# Patient Record
Sex: MALE | Race: WHITE | NOT HISPANIC OR LATINO | Employment: OTHER | URBAN - METROPOLITAN AREA
[De-identification: names, ages, dates, MRNs, and addresses within clinical notes are randomized per-mention and may not be internally consistent; named-entity substitution may affect disease eponyms.]

---

## 2017-10-14 ENCOUNTER — APPOINTMENT (OUTPATIENT)
Dept: CT IMAGING | Facility: HOSPITAL | Age: 72
End: 2017-10-14

## 2017-10-14 ENCOUNTER — HOSPITAL ENCOUNTER (EMERGENCY)
Facility: HOSPITAL | Age: 72
Discharge: HOME | End: 2017-10-14
Attending: EMERGENCY MEDICINE | Admitting: EMERGENCY MEDICINE

## 2017-10-14 VITALS
RESPIRATION RATE: 16 BRPM | SYSTOLIC BLOOD PRESSURE: 135 MMHG | TEMPERATURE: 97.3 F | WEIGHT: 210 LBS | BODY MASS INDEX: 29.4 KG/M2 | HEIGHT: 71 IN | DIASTOLIC BLOOD PRESSURE: 91 MMHG | OXYGEN SATURATION: 98 % | HEART RATE: 92 BPM

## 2017-10-14 DIAGNOSIS — S01.81XA FACIAL LACERATION, INITIAL ENCOUNTER: ICD-10-CM

## 2017-10-14 DIAGNOSIS — S01.01XA LACERATION OF SCALP, INITIAL ENCOUNTER: ICD-10-CM

## 2017-10-14 DIAGNOSIS — R55 NEAR SYNCOPE: Primary | ICD-10-CM

## 2017-10-14 DIAGNOSIS — S00.03XA CONTUSION OF SCALP, INITIAL ENCOUNTER: ICD-10-CM

## 2017-10-14 LAB
ALBUMIN SERPL-MCNC: 4.1 G/DL (ref 3.5–5.2)
ALBUMIN/GLOB SERPL: 1.4 G/DL
ALP SERPL-CCNC: 66 U/L (ref 39–117)
ALT SERPL W P-5'-P-CCNC: 22 U/L (ref 1–41)
ANION GAP SERPL CALCULATED.3IONS-SCNC: 14.7 MMOL/L
APTT PPP: 25.9 SECONDS (ref 22.7–35.4)
AST SERPL-CCNC: 13 U/L (ref 1–40)
BASOPHILS # BLD AUTO: 0.06 10*3/MM3 (ref 0–0.2)
BASOPHILS NFR BLD AUTO: 0.7 % (ref 0–1.5)
BILIRUB SERPL-MCNC: 0.6 MG/DL (ref 0.1–1.2)
BILIRUB UR QL STRIP: NEGATIVE
BUN BLD-MCNC: 18 MG/DL (ref 8–23)
BUN/CREAT SERPL: 16.4 (ref 7–25)
CALCIUM SPEC-SCNC: 9.6 MG/DL (ref 8.6–10.5)
CHLORIDE SERPL-SCNC: 104 MMOL/L (ref 98–107)
CLARITY UR: CLEAR
CO2 SERPL-SCNC: 23.3 MMOL/L (ref 22–29)
COLOR UR: YELLOW
CREAT BLD-MCNC: 1.1 MG/DL (ref 0.76–1.27)
DEPRECATED RDW RBC AUTO: 44.1 FL (ref 37–54)
EOSINOPHIL # BLD AUTO: 0.51 10*3/MM3 (ref 0–0.7)
EOSINOPHIL NFR BLD AUTO: 5.7 % (ref 0.3–6.2)
ERYTHROCYTE [DISTWIDTH] IN BLOOD BY AUTOMATED COUNT: 12.9 % (ref 11.5–14.5)
GFR SERPL CREATININE-BSD FRML MDRD: 66 ML/MIN/1.73
GLOBULIN UR ELPH-MCNC: 3 GM/DL
GLUCOSE BLD-MCNC: 125 MG/DL (ref 65–99)
GLUCOSE UR STRIP-MCNC: NEGATIVE MG/DL
HCT VFR BLD AUTO: 44.6 % (ref 40.4–52.2)
HGB BLD-MCNC: 15.8 G/DL (ref 13.7–17.6)
HGB UR QL STRIP.AUTO: NEGATIVE
IMM GRANULOCYTES # BLD: 0.05 10*3/MM3 (ref 0–0.03)
IMM GRANULOCYTES NFR BLD: 0.6 % (ref 0–0.5)
INR PPP: 1 (ref 0.9–1.1)
KETONES UR QL STRIP: NEGATIVE
LEUKOCYTE ESTERASE UR QL STRIP.AUTO: NEGATIVE
LYMPHOCYTES # BLD AUTO: 2.32 10*3/MM3 (ref 0.9–4.8)
LYMPHOCYTES NFR BLD AUTO: 25.9 % (ref 19.6–45.3)
MCH RBC QN AUTO: 33.2 PG (ref 27–32.7)
MCHC RBC AUTO-ENTMCNC: 35.4 G/DL (ref 32.6–36.4)
MCV RBC AUTO: 93.7 FL (ref 79.8–96.2)
MONOCYTES # BLD AUTO: 0.62 10*3/MM3 (ref 0.2–1.2)
MONOCYTES NFR BLD AUTO: 6.9 % (ref 5–12)
NEUTROPHILS # BLD AUTO: 5.41 10*3/MM3 (ref 1.9–8.1)
NEUTROPHILS NFR BLD AUTO: 60.2 % (ref 42.7–76)
NITRITE UR QL STRIP: NEGATIVE
PH UR STRIP.AUTO: 6 [PH] (ref 5–8)
PLATELET # BLD AUTO: 253 10*3/MM3 (ref 140–500)
PMV BLD AUTO: 9.9 FL (ref 6–12)
POTASSIUM BLD-SCNC: 3.8 MMOL/L (ref 3.5–5.2)
PROT SERPL-MCNC: 7.1 G/DL (ref 6–8.5)
PROT UR QL STRIP: NEGATIVE
PROTHROMBIN TIME: 12.8 SECONDS (ref 11.7–14.2)
RBC # BLD AUTO: 4.76 10*6/MM3 (ref 4.6–6)
SODIUM BLD-SCNC: 142 MMOL/L (ref 136–145)
SP GR UR STRIP: 1.02 (ref 1–1.03)
TROPONIN T SERPL-MCNC: <0.01 NG/ML (ref 0–0.03)
UROBILINOGEN UR QL STRIP: NORMAL
WBC NRBC COR # BLD: 8.97 10*3/MM3 (ref 4.5–10.7)

## 2017-10-14 PROCEDURE — 81003 URINALYSIS AUTO W/O SCOPE: CPT | Performed by: NURSE PRACTITIONER

## 2017-10-14 PROCEDURE — 93005 ELECTROCARDIOGRAM TRACING: CPT | Performed by: NURSE PRACTITIONER

## 2017-10-14 PROCEDURE — 85025 COMPLETE CBC W/AUTO DIFF WBC: CPT | Performed by: NURSE PRACTITIONER

## 2017-10-14 PROCEDURE — 99284 EMERGENCY DEPT VISIT MOD MDM: CPT

## 2017-10-14 PROCEDURE — 80053 COMPREHEN METABOLIC PANEL: CPT | Performed by: NURSE PRACTITIONER

## 2017-10-14 PROCEDURE — 85610 PROTHROMBIN TIME: CPT | Performed by: NURSE PRACTITIONER

## 2017-10-14 PROCEDURE — 70450 CT HEAD/BRAIN W/O DYE: CPT

## 2017-10-14 PROCEDURE — 85730 THROMBOPLASTIN TIME PARTIAL: CPT | Performed by: NURSE PRACTITIONER

## 2017-10-14 PROCEDURE — 84484 ASSAY OF TROPONIN QUANT: CPT | Performed by: NURSE PRACTITIONER

## 2017-10-14 PROCEDURE — 93010 ELECTROCARDIOGRAM REPORT: CPT | Performed by: INTERNAL MEDICINE

## 2017-10-14 RX ORDER — LIDOCAINE HYDROCHLORIDE AND EPINEPHRINE 10; 10 MG/ML; UG/ML
20 INJECTION, SOLUTION INFILTRATION; PERINEURAL ONCE
Status: COMPLETED | OUTPATIENT
Start: 2017-10-14 | End: 2017-10-14

## 2017-10-14 RX ADMIN — LIDOCAINE HYDROCHLORIDE AND EPINEPHRINE 20 ML: 10; 10 INJECTION, SOLUTION INFILTRATION; PERINEURAL at 12:30

## 2017-10-14 NOTE — ED NOTES
Attempted to obtain urine specimen at this time. Pt states he does not need to urinate at this time and does not want this RN to obtain specimen by urinary catheter.     Magy Newton RN  10/14/17 1120

## 2017-10-14 NOTE — ED PROVIDER NOTES
Patient presented to the ED with a laceration on his forehead that happened this morning when he struck his head on his house. On exam, the pt has a vertical laceration involving scalp and forehead. I agree with the plan to order a CT Head for further evaluation prior to the NP suturing the pt's laceration.     EKG           EKG time: 1109  Rhythm/Rate: NSR, 94  P waves and IL: normal P waves, 1st degree AV block  QRS, axis: RBBB, LAFB   ST and T waves: repolarization changes     Interpreted Contemporaneously by me, independently viewed  No old for comparison    I supervised care provided by the P. We have discussed the case. I have reviewed  the note and agree with the plan of treatment. I personally interviewed the patient and examined the patient.    Documentation assistance provided by phill Mcallister and Sunshine Dickens for Dr. Wheatley.  Information recorded by the scribe was done at my direction and has been verified and validated by me.        Sunshine Dickens  10/14/17 1054       Celina Mcallister  10/14/17 1139       Odin Wheatley MD  10/14/17 7914

## 2017-10-14 NOTE — ED NOTES
Pt c/o laceration to right side of the front of his head/forehead after hitting a chimney. Pt is unsure why he hit it. Pt is unsure if he lost consciousness or not     Magy Newton RN  10/14/17 1042       Magy Newton RN  10/14/17 6880

## 2017-10-14 NOTE — DISCHARGE INSTRUCTIONS
Continue current home medications  Keep lacerations clean and dry, apply antibiotic ointment  Monitor for signs of infection-redness,swelling,drainage, fever, chills  Have stiches removed in 7-10 days  Ice to head  Follow up with pmd of choice in 7-10 days  Return to er for headache, vomiting, visual changes, signs of infection, pain or any new or worsening symptoms

## 2017-10-14 NOTE — ED PROVIDER NOTES
"  EMERGENCY DEPARTMENT ENCOUNTER    CHIEF COMPLAINT  Chief Complaint: Laceration  History given by:Pt  History limited by:Nothing  Room Number: 36/36  PMD: No Known Provider      HPI:  Pt is a 72 y.o. male who presents with a facial and scalp laceration PTA.  He reports he was walking when he tripped and fell on the chimney corner. Pt denies LOC secondary to his fall. Pt denies neck pain, back pain, headache, or any other symptoms at this time. Patient denies any other symptoms at this time.  Pt has no significant Past Medical History.    Duration: PTA  Timing:sudden  Location:facial/scalp  Radiation:none  Quality:\"laceration\"  Intensity/Severity:moderate  Progression:unchanged  Associated Symptoms:none  Aggravating Factors:none  Alleviating Factors:none  Previous Episodes:none  Treatment before arrival:Pt received no treatment PTA.    PAST MEDICAL HISTORY  Active Ambulatory Problems     Diagnosis Date Noted   • No Active Ambulatory Problems     Resolved Ambulatory Problems     Diagnosis Date Noted   • No Resolved Ambulatory Problems     No Additional Past Medical History       PAST SURGICAL HISTORY  History reviewed. No pertinent surgical history.    FAMILY HISTORY  History reviewed. No pertinent family history.    SOCIAL HISTORY  Social History     Social History   • Marital status: Single     Spouse name: N/A   • Number of children: N/A   • Years of education: N/A     Occupational History   • Not on file.     Social History Main Topics   • Smoking status: Current Every Day Smoker   • Smokeless tobacco: Not on file   • Alcohol use No   • Drug use: Not on file   • Sexual activity: Not on file     Other Topics Concern   • Not on file     Social History Narrative   • No narrative on file         ALLERGIES  Review of patient's allergies indicates no known allergies.    REVIEW OF SYSTEMS  Review of Systems   Constitutional: Negative for chills and fever.   HENT: Negative for sore throat.    Gastrointestinal: Negative " for nausea and vomiting.   Genitourinary: Negative for dysuria.   Musculoskeletal: Negative for back pain.   Skin: Positive for wound (facial and scalp laceration). Negative for rash.   Psychiatric/Behavioral: The patient is not nervous/anxious.        PHYSICAL EXAM  ED Triage Vitals   Temp Heart Rate Resp BP SpO2   10/14/17 1011 10/14/17 1011 10/14/17 1011 10/14/17 1046 10/14/17 1011   97 °F (36.1 °C) 112 18 163/72 97 %      Temp src Heart Rate Source Patient Position BP Location FiO2 (%)   10/14/17 1011 10/14/17 1011 10/14/17 1046 10/14/17 1046 --   Tympanic Monitor Sitting Left arm          Physical Exam   Constitutional: He is well-developed, well-nourished, and in no distress. No distress.   HENT:   Head: Atraumatic.   Mouth/Throat: Mucous membranes are normal.   Eyes: Conjunctivae and EOM are normal. Pupils are equal, round, and reactive to light. No scleral icterus.   Neck: Normal range of motion.   Cardiovascular: Normal rate, regular rhythm and normal heart sounds.    Pulmonary/Chest: Effort normal and breath sounds normal.   Musculoskeletal: Normal range of motion.        Cervical back: He exhibits no tenderness and no bony tenderness.   Neurological: He is alert.   Skin: Skin is warm and dry. Laceration (5cm scalp laceration and 2cm forehead laceration) noted.   Psychiatric: Mood and affect normal.   Nursing note and vitals reviewed.        EKG    EKG was interpreted by Dr. Wheatley, see his note for interpretation      PROCEDURES    Laceration Repair  Date/Time: 10/14/2017 12:25 PM  Performed by: JUANI PANDYA  Authorized by: ZARA WHEATLEY   Consent: Verbal consent obtained.  Risks and benefits: risks, benefits and alternatives were discussed  Consent given by: patient  Patient understanding: patient states understanding of the procedure being performed  Patient consent: the patient's understanding of the procedure matches consent given  Required items: required blood products, implants, devices,  and special equipment available  Patient identity confirmed: verbally with patient  Body area: head/neck  Location details: scalp  Laceration length: 5 cm  Foreign bodies: no foreign bodies  Tendon involvement: none  Nerve involvement: none  Vascular damage: no    Anesthesia:  Local Anesthetic: lidocaine 1% with epinephrine  Anesthetic total: 7 mL    Sedation:  Patient sedated: no  Preparation: Patient was prepped and draped in the usual sterile fashion.    Laceration Repair  Date/Time: 10/14/2017 12:25 PM  Performed by: JUANI PANDYA  Authorized by: ZARA WHEATLEY   Consent: Verbal consent obtained.  Risks and benefits: risks, benefits and alternatives were discussed  Consent given by: patient  Patient understanding: patient states understanding of the procedure being performed  Patient consent: the patient's understanding of the procedure matches consent given  Required items: required blood products, implants, devices, and special equipment available  Patient identity confirmed: verbally with patient  Body area: head/neck  Location details: forehead  Laceration length: 2 cm  Foreign bodies: no foreign bodies  Tendon involvement: none  Nerve involvement: none  Vascular damage: no    Anesthesia:  Local Anesthetic: lidocaine 1% with epinephrine  Anesthetic total: 3 mL    Sedation:  Patient sedated: no  Skin closure: 4-0 nylon  Number of sutures: 2  Technique: simple  Approximation difficulty: simple  Dressing: 4x4 sterile gauze  Patient tolerance: Patient tolerated the procedure well with no immediate complications          PROGRESS AND CONSULTS    1047 Reviewed pt's history and workup with Dr. Wheatley.  At bedside evaluation, they agree with the plan of care.    1340 Recheck pt who is resting and in no acute distress. Informed pt plan to discharge. Suggested pt follow up with PCP and have sutures removed in 7-10 days. Suggested pt keep sutures clean and dry. Reviewed implications of results, diagnosis, meds,  "responsibility to follow up, warning signs and symptoms of possible worsening, potential complications and reasons to return to ER with patient.  Discussed all results and noted any abnormalities with patient.  Discussed absolute need to recheck abnormalities with new or worsening symptoms.    Discussed plan for discharge, as there is no emergent indication for admission.  Pt is agreeable and understands need for follow up and repeat testing.  Pt is aware that discharge does not mean that nothing is wrong but it indicates no emergency is present.  Pt is discharged with instructions to follow up with primary care doctor to have their blood pressure rechecked.       DIAGNOSIS  Final diagnoses:   Near syncope   Contusion of scalp, initial encounter   Facial laceration, initial encounter   Laceration of scalp, initial encounter       FOLLOW UP   [facility] REFERRAL SERVICE  [place] [id number] [phone]  Call in 1 week          COURSE & MEDICAL DECISION MAKING  Pertinent Labs and Imaging studies that were ordered and reviewed are noted above.  Results were reviewed/discussed with the patient and they were also made aware of online assess.   Pt also made aware that some labs, such as cultures, will not be resulted during ER visit and follow up with PMD is necessary.     MEDICATIONS GIVEN IN ER  Medications   lidocaine-EPINEPHrine (XYLOCAINE W/EPI) 1 %-1:730208 injection 20 mL (20 mL Injection Given 10/14/17 1230)       /87 (Patient Position: Sitting)  Pulse 82  Temp 97 °F (36.1 °C) (Tympanic)   Resp 16  Ht 71\" (180.3 cm)  Wt 210 lb (95.3 kg)  SpO2 98%  BMI 29.29 kg/m2      I personally reviewed the past medical history, past surgical history, social history, family history, current medications and allergies as they appear in this chart.  The scribe's note accurately reflects the work and decisions made by me.     Documentation assistance provided by clarissa Vaughn " for HAYDEN Madsen on 10/14/2017 at 10:39 AM. Information recorded by the scribe was done at my direction and has been verified and validated by me.       Guerrero Vaughn  10/14/17 0937       Lorraine Hayden, STEVE  10/14/17 9320

## 2019-05-05 ENCOUNTER — APPOINTMENT (OUTPATIENT)
Dept: GENERAL RADIOLOGY | Facility: HOSPITAL | Age: 74
End: 2019-05-05

## 2019-05-05 ENCOUNTER — HOSPITAL ENCOUNTER (INPATIENT)
Facility: HOSPITAL | Age: 74
LOS: 2 days | Discharge: HOME | End: 2019-05-07
Attending: EMERGENCY MEDICINE | Admitting: INTERNAL MEDICINE

## 2019-05-05 ENCOUNTER — APPOINTMENT (OUTPATIENT)
Dept: CARDIOLOGY | Facility: HOSPITAL | Age: 74
End: 2019-05-05

## 2019-05-05 DIAGNOSIS — I44.2 THIRD DEGREE HEART BLOCK (HCC): Primary | ICD-10-CM

## 2019-05-05 DIAGNOSIS — R55 RECURRENT SYNCOPE: ICD-10-CM

## 2019-05-05 LAB
ALBUMIN SERPL-MCNC: 4.2 G/DL (ref 3.5–5.2)
ALBUMIN SERPL-MCNC: 4.3 G/DL (ref 3.5–5.2)
ALBUMIN/GLOB SERPL: 1.5 G/DL
ALBUMIN/GLOB SERPL: 1.7 G/DL
ALP SERPL-CCNC: 60 U/L (ref 39–117)
ALP SERPL-CCNC: 64 U/L (ref 39–117)
ALT SERPL W P-5'-P-CCNC: 38 U/L (ref 1–41)
ALT SERPL W P-5'-P-CCNC: 44 U/L (ref 1–41)
ANION GAP SERPL CALCULATED.3IONS-SCNC: 12.4 MMOL/L
ANION GAP SERPL CALCULATED.3IONS-SCNC: 17.7 MMOL/L
AST SERPL-CCNC: 15 U/L (ref 1–40)
AST SERPL-CCNC: 18 U/L (ref 1–40)
BASOPHILS # BLD AUTO: 0.04 10*3/MM3 (ref 0–0.2)
BASOPHILS NFR BLD AUTO: 0.2 % (ref 0–1.5)
BILIRUB SERPL-MCNC: 1.3 MG/DL (ref 0.2–1.2)
BILIRUB SERPL-MCNC: 1.6 MG/DL (ref 0.2–1.2)
BUN BLD-MCNC: 16 MG/DL (ref 8–23)
BUN BLD-MCNC: 18 MG/DL (ref 8–23)
BUN/CREAT SERPL: 13.9 (ref 7–25)
BUN/CREAT SERPL: 14 (ref 7–25)
CALCIUM SPEC-SCNC: 8.9 MG/DL (ref 8.6–10.5)
CALCIUM SPEC-SCNC: 9.5 MG/DL (ref 8.6–10.5)
CHLORIDE SERPL-SCNC: 106 MMOL/L (ref 98–107)
CHLORIDE SERPL-SCNC: 108 MMOL/L (ref 98–107)
CO2 SERPL-SCNC: 18.3 MMOL/L (ref 22–29)
CO2 SERPL-SCNC: 21.6 MMOL/L (ref 22–29)
CREAT BLD-MCNC: 1.15 MG/DL (ref 0.76–1.27)
CREAT BLD-MCNC: 1.29 MG/DL (ref 0.76–1.27)
D-LACTATE SERPL-SCNC: 1.3 MMOL/L (ref 0.5–2)
DEPRECATED RDW RBC AUTO: 43.4 FL (ref 37–54)
EOSINOPHIL # BLD AUTO: 0.07 10*3/MM3 (ref 0–0.4)
EOSINOPHIL NFR BLD AUTO: 0.4 % (ref 0.3–6.2)
ERYTHROCYTE [DISTWIDTH] IN BLOOD BY AUTOMATED COUNT: 12.6 % (ref 12.3–15.4)
GFR SERPL CREATININE-BSD FRML MDRD: 55 ML/MIN/1.73
GFR SERPL CREATININE-BSD FRML MDRD: 63 ML/MIN/1.73
GLOBULIN UR ELPH-MCNC: 2.5 GM/DL
GLOBULIN UR ELPH-MCNC: 2.9 GM/DL
GLUCOSE BLD-MCNC: 120 MG/DL (ref 65–99)
GLUCOSE BLD-MCNC: 121 MG/DL (ref 65–99)
GLUCOSE BLDC GLUCOMTR-MCNC: 113 MG/DL (ref 70–130)
GLUCOSE BLDC GLUCOMTR-MCNC: 121 MG/DL (ref 70–130)
GLUCOSE BLDC GLUCOMTR-MCNC: 137 MG/DL (ref 70–130)
HCT VFR BLD AUTO: 47.2 % (ref 37.5–51)
HGB BLD-MCNC: 16 G/DL (ref 13–17.7)
HOLD SPECIMEN: NORMAL
HOLD SPECIMEN: NORMAL
IMM GRANULOCYTES # BLD AUTO: 0.12 10*3/MM3 (ref 0–0.05)
IMM GRANULOCYTES NFR BLD AUTO: 0.7 % (ref 0–0.5)
INR PPP: 1.02 (ref 0.9–1.1)
LYMPHOCYTES # BLD AUTO: 2.2 10*3/MM3 (ref 0.7–3.1)
LYMPHOCYTES NFR BLD AUTO: 12.7 % (ref 19.6–45.3)
MAGNESIUM SERPL-MCNC: 2.2 MG/DL (ref 1.6–2.4)
MAGNESIUM SERPL-MCNC: 2.2 MG/DL (ref 1.6–2.4)
MCH RBC QN AUTO: 31.8 PG (ref 26.6–33)
MCHC RBC AUTO-ENTMCNC: 33.9 G/DL (ref 31.5–35.7)
MCV RBC AUTO: 93.8 FL (ref 79–97)
MONOCYTES # BLD AUTO: 1.01 10*3/MM3 (ref 0.1–0.9)
MONOCYTES NFR BLD AUTO: 5.8 % (ref 5–12)
NEUTROPHILS # BLD AUTO: 13.9 10*3/MM3 (ref 1.7–7)
NEUTROPHILS NFR BLD AUTO: 80.2 % (ref 42.7–76)
NRBC BLD AUTO-RTO: 0 /100 WBC (ref 0–0.2)
PLATELET # BLD AUTO: 330 10*3/MM3 (ref 140–450)
PMV BLD AUTO: 10.3 FL (ref 6–12)
POTASSIUM BLD-SCNC: 3.4 MMOL/L (ref 3.5–5.2)
POTASSIUM BLD-SCNC: 3.5 MMOL/L (ref 3.5–5.2)
PROT SERPL-MCNC: 6.7 G/DL (ref 6–8.5)
PROT SERPL-MCNC: 7.2 G/DL (ref 6–8.5)
PROTHROMBIN TIME: 13.1 SECONDS (ref 11.7–14.2)
RBC # BLD AUTO: 5.03 10*6/MM3 (ref 4.14–5.8)
SODIUM BLD-SCNC: 142 MMOL/L (ref 136–145)
SODIUM BLD-SCNC: 142 MMOL/L (ref 136–145)
T4 FREE SERPL-MCNC: 1.08 NG/DL (ref 0.93–1.7)
TROPONIN T SERPL-MCNC: <0.01 NG/ML (ref 0–0.03)
TSH SERPL DL<=0.05 MIU/L-ACNC: 2.06 MIU/ML (ref 0.27–4.2)
WBC NRBC COR # BLD: 17.34 10*3/MM3 (ref 3.4–10.8)
WHOLE BLOOD HOLD SPECIMEN: NORMAL
WHOLE BLOOD HOLD SPECIMEN: NORMAL

## 2019-05-05 PROCEDURE — 93010 ELECTROCARDIOGRAM REPORT: CPT | Performed by: INTERNAL MEDICINE

## 2019-05-05 PROCEDURE — 93321 DOPPLER ECHO F-UP/LMTD STD: CPT

## 2019-05-05 PROCEDURE — 82962 GLUCOSE BLOOD TEST: CPT

## 2019-05-05 PROCEDURE — 93005 ELECTROCARDIOGRAM TRACING: CPT | Performed by: INTERNAL MEDICINE

## 2019-05-05 PROCEDURE — 93325 DOPPLER ECHO COLOR FLOW MAPG: CPT

## 2019-05-05 PROCEDURE — 85025 COMPLETE CBC W/AUTO DIFF WBC: CPT | Performed by: EMERGENCY MEDICINE

## 2019-05-05 PROCEDURE — 83735 ASSAY OF MAGNESIUM: CPT | Performed by: INTERNAL MEDICINE

## 2019-05-05 PROCEDURE — 84443 ASSAY THYROID STIM HORMONE: CPT | Performed by: EMERGENCY MEDICINE

## 2019-05-05 PROCEDURE — 5A1223Z PERFORMANCE OF CARDIAC PACING, CONTINUOUS: ICD-10-PCS | Performed by: INTERNAL MEDICINE

## 2019-05-05 PROCEDURE — 93308 TTE F-UP OR LMTD: CPT

## 2019-05-05 PROCEDURE — 83605 ASSAY OF LACTIC ACID: CPT | Performed by: INTERNAL MEDICINE

## 2019-05-05 PROCEDURE — 93321 DOPPLER ECHO F-UP/LMTD STD: CPT | Performed by: INTERNAL MEDICINE

## 2019-05-05 PROCEDURE — 76000 FLUOROSCOPY <1 HR PHYS/QHP: CPT | Performed by: INTERNAL MEDICINE

## 2019-05-05 PROCEDURE — 84484 ASSAY OF TROPONIN QUANT: CPT | Performed by: EMERGENCY MEDICINE

## 2019-05-05 PROCEDURE — 85610 PROTHROMBIN TIME: CPT | Performed by: EMERGENCY MEDICINE

## 2019-05-05 PROCEDURE — 25010000002 MIDAZOLAM PER 1 MG: Performed by: INTERNAL MEDICINE

## 2019-05-05 PROCEDURE — 33210 INSERT ELECTRD/PM CATH SNGL: CPT | Performed by: INTERNAL MEDICINE

## 2019-05-05 PROCEDURE — 84439 ASSAY OF FREE THYROXINE: CPT | Performed by: EMERGENCY MEDICINE

## 2019-05-05 PROCEDURE — 71045 X-RAY EXAM CHEST 1 VIEW: CPT

## 2019-05-05 PROCEDURE — 99291 CRITICAL CARE FIRST HOUR: CPT

## 2019-05-05 PROCEDURE — 25010000002 FENTANYL CITRATE (PF) 100 MCG/2ML SOLUTION: Performed by: INTERNAL MEDICINE

## 2019-05-05 PROCEDURE — C1894 INTRO/SHEATH, NON-LASER: HCPCS | Performed by: INTERNAL MEDICINE

## 2019-05-05 PROCEDURE — 25010000002 ENOXAPARIN PER 10 MG: Performed by: INTERNAL MEDICINE

## 2019-05-05 PROCEDURE — 99223 1ST HOSP IP/OBS HIGH 75: CPT | Performed by: INTERNAL MEDICINE

## 2019-05-05 PROCEDURE — 93308 TTE F-UP OR LMTD: CPT | Performed by: INTERNAL MEDICINE

## 2019-05-05 PROCEDURE — 93325 DOPPLER ECHO COLOR FLOW MAPG: CPT | Performed by: INTERNAL MEDICINE

## 2019-05-05 PROCEDURE — 93005 ELECTROCARDIOGRAM TRACING: CPT | Performed by: EMERGENCY MEDICINE

## 2019-05-05 PROCEDURE — 80053 COMPREHEN METABOLIC PANEL: CPT | Performed by: INTERNAL MEDICINE

## 2019-05-05 PROCEDURE — 83735 ASSAY OF MAGNESIUM: CPT | Performed by: EMERGENCY MEDICINE

## 2019-05-05 PROCEDURE — 80053 COMPREHEN METABOLIC PANEL: CPT | Performed by: EMERGENCY MEDICINE

## 2019-05-05 RX ORDER — SODIUM CHLORIDE 9 MG/ML
INJECTION, SOLUTION INTRAVENOUS CONTINUOUS PRN
Status: COMPLETED | OUTPATIENT
Start: 2019-05-05 | End: 2019-05-05

## 2019-05-05 RX ORDER — MIDAZOLAM HYDROCHLORIDE 1 MG/ML
INJECTION INTRAMUSCULAR; INTRAVENOUS AS NEEDED
Status: DISCONTINUED | OUTPATIENT
Start: 2019-05-05 | End: 2019-05-05 | Stop reason: HOSPADM

## 2019-05-05 RX ORDER — ZOLPIDEM TARTRATE 5 MG/1
5 TABLET ORAL NIGHTLY PRN
Status: DISCONTINUED | OUTPATIENT
Start: 2019-05-05 | End: 2019-05-07 | Stop reason: HOSPADM

## 2019-05-05 RX ORDER — FENTANYL CITRATE 50 UG/ML
INJECTION, SOLUTION INTRAMUSCULAR; INTRAVENOUS AS NEEDED
Status: DISCONTINUED | OUTPATIENT
Start: 2019-05-05 | End: 2019-05-05 | Stop reason: HOSPADM

## 2019-05-05 RX ORDER — LIDOCAINE HYDROCHLORIDE 10 MG/ML
INJECTION, SOLUTION INFILTRATION; PERINEURAL AS NEEDED
Status: DISCONTINUED | OUTPATIENT
Start: 2019-05-05 | End: 2019-05-05 | Stop reason: HOSPADM

## 2019-05-05 RX ORDER — SODIUM CHLORIDE 0.9 % (FLUSH) 0.9 %
3-10 SYRINGE (ML) INJECTION AS NEEDED
Status: DISCONTINUED | OUTPATIENT
Start: 2019-05-05 | End: 2019-05-07 | Stop reason: HOSPADM

## 2019-05-05 RX ORDER — DEXAMETHASONE SODIUM PHOSPHATE 4 MG/ML
4 INJECTION, SOLUTION INTRA-ARTICULAR; INTRALESIONAL; INTRAMUSCULAR; INTRAVENOUS; SOFT TISSUE EVERY 6 HOURS PRN
Status: DISCONTINUED | OUTPATIENT
Start: 2019-05-05 | End: 2019-05-05

## 2019-05-05 RX ORDER — SODIUM CHLORIDE 0.9 % (FLUSH) 0.9 %
3 SYRINGE (ML) INJECTION EVERY 12 HOURS SCHEDULED
Status: DISCONTINUED | OUTPATIENT
Start: 2019-05-05 | End: 2019-05-07 | Stop reason: HOSPADM

## 2019-05-05 RX ORDER — ACETAMINOPHEN 325 MG/1
650 TABLET ORAL EVERY 4 HOURS PRN
Status: DISCONTINUED | OUTPATIENT
Start: 2019-05-05 | End: 2019-05-07 | Stop reason: HOSPADM

## 2019-05-05 RX ADMIN — SODIUM CHLORIDE 500 ML: 9 INJECTION, SOLUTION INTRAVENOUS at 11:47

## 2019-05-05 RX ADMIN — ENOXAPARIN SODIUM 40 MG: 40 INJECTION SUBCUTANEOUS at 14:33

## 2019-05-05 RX ADMIN — ATROPINE SULFATE 1 MG: 0.1 INJECTION PARENTERAL at 11:43

## 2019-05-05 NOTE — ED PROVIDER NOTES
EMERGENCY DEPARTMENT ENCOUNTER    CHIEF COMPLAINT  Chief Complaint: Syncope  History given by: Pt  History limited by: Nothing  Room Number: Pool/DEL  PMD: Provider, No Known      HPI:  Pt is a 73 y.o. male who presents complaining of several episodes of syncope with his first episode starting yesterday. Pt reports that prior to his syncopal episodes he becomes lightheaded and SOA then loses consciousness. Pt reports that this morning he had another syncopal episode and believes he struck his left head against a coffee table. Pt denies CP. Pt denies a hx of heart problems. He reports that he has not been seen by a physician in 40-50 years and is not on any medications.     Duration:  brief  Onset: gradual  Timing: constant  Quality: Syncope with preceding lightheadedness and SOA  Intensity/Severity: moderate  Progression: improved  Associated Symptoms: lightheadedness and SOA  Aggravating Factors: none stated  Alleviating Factors: none stated  Previous Episodes: Pt states he has not seen a physician in 40-50 years  Treatment before arrival: None stated PTA    PAST MEDICAL HISTORY  Active Ambulatory Problems     Diagnosis Date Noted   • No Active Ambulatory Problems     Resolved Ambulatory Problems     Diagnosis Date Noted   • No Resolved Ambulatory Problems     No Additional Past Medical History       PAST SURGICAL HISTORY  History reviewed. No pertinent surgical history.    FAMILY HISTORY  History reviewed. No pertinent family history.    SOCIAL HISTORY  Social History     Socioeconomic History   • Marital status: Single     Spouse name: Not on file   • Number of children: Not on file   • Years of education: Not on file   • Highest education level: Not on file   Tobacco Use   • Smoking status: Current Every Day Smoker   Substance and Sexual Activity   • Alcohol use: No       ALLERGIES  Patient has no known allergies.    REVIEW OF SYSTEMS  Review of Systems   Constitutional: Negative for activity change, appetite  change and fever.   HENT: Negative for congestion and sore throat.    Eyes: Negative.    Respiratory: Positive for shortness of breath. Negative for cough.    Cardiovascular: Negative for chest pain and leg swelling.   Gastrointestinal: Negative for abdominal pain, diarrhea and vomiting.   Endocrine: Negative.    Genitourinary: Negative for decreased urine volume and dysuria.   Musculoskeletal: Negative for neck pain.   Skin: Negative for rash and wound.   Allergic/Immunologic: Negative.    Neurological: Positive for syncope and light-headedness. Negative for weakness, numbness and headaches.   Hematological: Negative.    Psychiatric/Behavioral: Negative.    All other systems reviewed and are negative.      PHYSICAL EXAM  ED Triage Vitals   Temp Pulse Resp BP SpO2   05/05/19 1126 -- 05/05/19 1125 05/05/19 1125 05/05/19 1125   97.2 °F (36.2 °C)  18 118/62 98 %      Temp src Heart Rate Source Patient Position BP Location FiO2 (%)   -- -- -- -- --              Physical Exam   Constitutional: He is oriented to person, place, and time. He appears distressed (moderate).   HENT:   Head: Normocephalic.   Mouth/Throat: Mucous membranes are dry.   Abrasion to the left forehead   Eyes: EOM are normal. Pupils are equal, round, and reactive to light.   Pale conjunctivae   Neck: Normal range of motion. Neck supple.   Cardiovascular: Regular rhythm and normal heart sounds. Bradycardia present.   HR in the 30s   Pulmonary/Chest: Effort normal and breath sounds normal. No respiratory distress.   Abdominal: Soft. There is no tenderness. There is no rebound and no guarding.   Musculoskeletal: Normal range of motion. He exhibits no edema.   Neurological: He is alert and oriented to person, place, and time. He has normal sensation and normal strength.   Skin: Skin is warm and dry.   Psychiatric: Mood and affect normal.   Nursing note and vitals reviewed.      LAB RESULTS  Lab Results (last 24 hours)     Procedure Component Value Units  Date/Time    CBC & Differential [[id number]] Collected:  05/05/19 1142    Specimen:  Blood Updated:  05/05/19 1212    Narrative:       The following orders were created for panel order CBC & Differential.  Procedure                               Abnormality         Status                     ---------                               -----------         ------                     CBC Auto Differential[[id number]]        Abnormal            Final result                 Please view results for these tests on the individual orders.    Comprehensive Metabolic Panel [[id number]]  (Abnormal) Collected:  05/05/19 1142    Specimen:  Blood Updated:  05/05/19 1224     Glucose 121 mg/dL      BUN 18 mg/dL      Creatinine 1.29 mg/dL      Sodium 142 mmol/L      Potassium 3.5 mmol/L      Chloride 106 mmol/L      CO2 18.3 mmol/L      Calcium 9.5 mg/dL      Total Protein 7.2 g/dL      Albumin 4.30 g/dL      ALT (SGPT) 44 U/L      AST (SGOT) 18 U/L      Alkaline Phosphatase 64 U/L      Total Bilirubin 1.3 mg/dL      eGFR Non African Amer 55 mL/min/1.73      Globulin 2.9 gm/dL      A/G Ratio 1.5 g/dL      BUN/Creatinine Ratio 14.0     Anion Gap 17.7 mmol/L     Narrative:       GFR Normal >60  Chronic Kidney Disease <60  Kidney Failure <15    Protime-INR [[id number]]  (Normal) Collected:  05/05/19 1142    Specimen:  Blood Updated:  05/05/19 1213     Protime 13.1 Seconds      INR 1.02    Troponin [[id number]]  (Normal) Collected:  05/05/19 1142    Specimen:  Blood Updated:  05/05/19 1232     Troponin T <0.010 ng/mL     Narrative:       Troponin T Reference Range:  <= 0.03 ng/mL-   Negative for AMI  >0.03 ng/mL-     Abnormal for myocardial necrosis.  Clinicians would have to utilize clinical acumen, EKG, Troponin and serial changes to determine if it is an Acute Myocardial Infarction or myocardial injury due to an underlying chronic condition.     Magnesium [[id number]]  (Normal) Collected:  05/05/19 1142    Specimen:  Blood Updated:  05/05/19  1224     Magnesium 2.2 mg/dL     TSH [[id number]]  (Normal) Collected:  05/05/19 1142    Specimen:  Blood Updated:  05/05/19 1232     TSH 2.060 mIU/mL     T4, Free [[id number]]  (Normal) Collected:  05/05/19 1142    Specimen:  Blood Updated:  05/05/19 1232     Free T4 1.08 ng/dL     CBC Auto Differential [[id number]]  (Abnormal) Collected:  05/05/19 1142    Specimen:  Blood Updated:  05/05/19 1212     WBC 17.34 10*3/mm3      RBC 5.03 10*6/mm3      Hemoglobin 16.0 g/dL      Hematocrit 47.2 %      MCV 93.8 fL      MCH 31.8 pg      MCHC 33.9 g/dL      RDW 12.6 %      RDW-SD 43.4 fl      MPV 10.3 fL      Platelets 330 10*3/mm3      Neutrophil % 80.2 %      Lymphocyte % 12.7 %      Monocyte % 5.8 %      Eosinophil % 0.4 %      Basophil % 0.2 %      Immature Grans % 0.7 %      Neutrophils, Absolute 13.90 10*3/mm3      Lymphocytes, Absolute 2.20 10*3/mm3      Monocytes, Absolute 1.01 10*3/mm3      Eosinophils, Absolute 0.07 10*3/mm3      Basophils, Absolute 0.04 10*3/mm3      Immature Grans, Absolute 0.12 10*3/mm3      nRBC 0.0 /100 WBC           I ordered the above labs and reviewed the results    RADIOLOGY  XR Chest 1 View   Preliminary Result   There is no evidence for an active or acute cardiopulmonary   process.               I ordered the above noted radiological studies. Interpreted by radiologist. Reviewed by me in PACS.       PROCEDURES  Critical Care  Performed by: John Martinez MD  Authorized by: John Martinez MD     Critical care provider statement:     Critical care time (minutes):  35    Critical care time was exclusive of:  Separately billable procedures and treating other patients    Critical care was necessary to treat or prevent imminent or life-threatening deterioration of the following conditions:  Cardiac failure    Critical care was time spent personally by me on the following activities:  Ordering and performing treatments and interventions, ordering and review of laboratory studies, ordering and  review of radiographic studies, pulse oximetry, re-evaluation of patient's condition, review of old charts, obtaining history from patient or surrogate, examination of patient, evaluation of patient's response to treatment, discussions with consultants and development of treatment plan with patient or surrogate          EKG    EKG time: 1138  Rhythm/Rate: 3rd degree heart block, 35  RBBB  T wave inversions inferolaterally  Q waves with ST elevation in V1 only    Changed compared to 2017 where bradycardia and T wave inversions are new    Interpreted Contemporaneously by me.  Independently viewed by me      PROGRESS AND CONSULTS       1142 - Atropine and IVF ordered.     1144 - Lab work and CXR ordered for further evaluation.     1148 - Dr. Saunders (Cardiology) is at bedside. Pt had no change with atropine.     1156 - Pt had an episode of sinus pause lasting 5 seconds where he lost consciousness where he had a spontaneous return of heart to the 120s with sinus tachycardia.     1158 - Dr. Saunders states that he has spoken with Dr. Jeffries (Interventional Cardiology) who states that he will take the pt to the cath lab for temporary pacemaker placement.     1213 - Rechecked pt. Pt had another episode of sinus pause. Pt reports he has experiencing these episodes of sinus pause since this morning. Informed the pt of the plan to take him to the cath lab for pacemaker placement. Pt understands and agrees with plan. All questions answered.     1224 - Pt is being taken to the cath lab at this time.     MEDICAL DECISION MAKING  Results were reviewed/discussed with the patient and they were also made aware of online access. Pt also made aware that some labs, such as cultures, will not be resulted during ER visit and follow up with PMD is necessary.     MDM  Number of Diagnoses or Management Options  Recurrent syncope:   Third degree heart block (CMS/HCC):      Amount and/or Complexity of Data Reviewed  Clinical lab tests:  ordered and reviewed (Magnesium - 2.2  Potassium - 3.5  Sodium - 142)  Tests in the radiology section of CPT®: ordered and reviewed (CXR - negative acute)  Tests in the medicine section of CPT®: ordered and reviewed (See EKG procedure note.)  Decide to obtain previous medical records or to obtain history from someone other than the patient: yes  Review and summarize past medical records: yes (Pt was seen in the ED in 2017 for syncope)  Discuss the patient with other providers: yes (Dr. Saunders (Cardiology))    Critical Care  Total time providing critical care: 30-74 minutes         DIAGNOSIS  Final diagnoses:   Third degree heart block (CMS/HCC)   Recurrent syncope       DISPOSITION  ADMISSION    Discussed treatment plan and reason for admission with pt/family and admitting physician.  Pt/family voiced understanding of the plan for admission for further testing/treatment as needed.     Latest Documented Vital Signs:  As of 12:37 PM  BP- 135/56 HR- 55 Temp- 97.2 °F (36.2 °C) O2 sat- 98%    --  Documentation assistance provided by clarissa Pedro for Dr. Martinez.  Information recorded by the scribe was done at my direction and has been verified and validated by me.     Edison Pedro  05/05/19 9748       John Martinez MD  05/05/19 1125

## 2019-05-05 NOTE — NURSING NOTE
Pacer not capturing when patient's intrinsic rhythm paused for 6 seconds. Pacer settings increased, MA to 10. When MAs increased pacer failed to sense correctly, pacer spikes in P, T, and QRSs. Sensitivity decreased to 1.5.  Patient noted to have increased ectopy and a second, shorter pause.   Dr. Saunders [email or web address].

## 2019-05-05 NOTE — NURSING NOTE
Patient requiring transcutaneous pacing emergently due to pacer failure to capture.   Paced per ACLS protocol for symptomatic bradycardia during pauses 6-10 seconds long.

## 2019-05-05 NOTE — H&P
Date of Hospital Visit:19  Encounter Provider: Cuca Lao, RN EXTENDER  Place of Service: [facility] CARDIOLOGY  Patient Name: Orion Zepeda  :1945  Referral Provider: Moises Saunders MD    Chief complaint: syncope    History of Present Illness:  Mr. Zpeeda is a 73 year old man who presented to ED with syncope and seizures. His EKG showed complete heart block. He had two witnessed episodes in the ED in which there were no escape beats and associated with seizures. He was taken urgently to cardiac cath lab for transvenous pacemaker.     He was previously seen in ED in 2017 following a syncopal episode and sustaining a scalp laceration. 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   He really does not follow-up with any physicians.  Denies any other medical issues and has had no passing out spells in the past denies history of rheumatic fever, heart attack, heart failure, heart murmur has had no chest pain or pressure and no real shortness of breath.    History reviewed. No pertinent past medical history.    History reviewed. No pertinent surgical history.    No current facility-administered medications on file prior to encounter.      No current outpatient medications on file prior to encounter.       Social History     Socioeconomic History   • Marital status: Single     Spouse name: Not on file   • Number of children: Not on file   • Years of education: Not on file   • Highest education level: Not on file   Tobacco Use   • Smoking status: Current Every Day Smoker   Substance and Sexual Activity   • Alcohol use: No       History reviewed. No pertinent family history.     REVIEW OF SYSTEMS:   All ROS was performed and is Negative except as outlined in HPI     Objective:     Vitals:    19 1208 19 1218 19 1219 19 1220   BP: 137/94  (!) 88/61    Pulse:  115 (!) 0 110   Resp:       Temp:   "     SpO2: 98% 96% 99% 99%   Weight:       Height:         Body mass index is 27.94 kg/m².  Flowsheet Rows      First Filed Value   Admission Height  182.9 cm (72\") Documented at 05/05/2019 1138   Admission Weight  93.4 kg (206 lb) Documented at 05/05/2019 1138          Physical Exam   Physical Exam   Constitutional: He is oriented to person, place, and time. He appears well-developed and well-nourished. No distress.   HENT:   Head: Normocephalic.   Eyes: Conjunctivae are normal. Pupils are equal, round, and reactive to light. No scleral icterus.   Neck: Normal carotid pulses, no hepatojugular reflux and no JVD present. Carotid bruit is not present. No tracheal deviation, no edema and no erythema present. No thyromegaly present.   Cardiovascular: Regular rhythm, S1 normal, S2 normal, normal heart sounds and intact distal pulses.  No extrasystoles are present. Bradycardia present. PMI is not displaced. Exam reveals no gallop, no distant heart sounds and no friction rub.   No murmur heard.  Pulses:       Carotid pulses are 2+ on the right side, and 2+ on the left side.       Radial pulses are 2+ on the right side, and 2+ on the left side.        Femoral pulses are 2+ on the right side, and 2+ on the left side.       Dorsalis pedis pulses are 2+ on the right side, and 2+ on the left side.        Posterior tibial pulses are 2+ on the right side, and 2+ on the left side.   Pulmonary/Chest: Effort normal and breath sounds normal. No respiratory distress. He has no decreased breath sounds. He has no wheezes. He has no rhonchi. He has no rales. He exhibits no tenderness.   Abdominal: Soft. Bowel sounds are normal. He exhibits no distension and no mass. There is no hepatosplenomegaly. There is no tenderness. There is no rebound and no guarding.   Musculoskeletal: He exhibits no edema, tenderness or deformity.   Neurological: He is alert and oriented to person, place, and time.   Skin: Skin is warm and dry. No rash noted. He " is not diaphoretic. No cyanosis or erythema. No pallor. Nails show no clubbing.   Psychiatric: He has a normal mood and affect. His speech is normal and behavior is normal. Judgment and thought content normal.       Lab Review:                        Results from last 7 days   Lab Units 05/05/19  1142   WBC 10*3/mm3 17.34*   HEMOGLOBIN g/dL 16.0   HEMATOCRIT % 47.2   PLATELETS 10*3/mm3 330     Results from last 7 days   Lab Units 05/05/19  1142   INR  1.02                   EKG:          I personally viewed and interpreted the patient's EKG/Telemetry data     Assessment:   1.  73-year-old gentleman with complete heart block.  He had previous trifascicular block and is most likely just progression of that.  That however is never been worked up.  His electrolytes are normal his thyroid studies are normal troponin is negative.  We will plan temporary pacemaker now since he has had a couple of seizures now from asystole.  We will also check an echocardiogram he will need a permanent pacemaker implanted.  Will ultimately need stress testing.  2.  Renal insufficiency may be due to low perfusion due to his heart rate will follow this afterwards.                  Plan:

## 2019-05-06 LAB
BH CV ECHO MEAS - AO MAX PG (FULL): 2.7 MMHG
BH CV ECHO MEAS - AO MAX PG: 4.2 MMHG
BH CV ECHO MEAS - AO MEAN PG (FULL): 1 MMHG
BH CV ECHO MEAS - AO MEAN PG: 2 MMHG
BH CV ECHO MEAS - AO ROOT AREA (BSA CORRECTED): 1.6
BH CV ECHO MEAS - AO ROOT AREA: 9.1 CM^2
BH CV ECHO MEAS - AO ROOT DIAM: 3.4 CM
BH CV ECHO MEAS - AO V2 MAX: 103 CM/SEC
BH CV ECHO MEAS - AO V2 MEAN: 62.3 CM/SEC
BH CV ECHO MEAS - AO V2 VTI: 18.4 CM
BH CV ECHO MEAS - AVA(I,A): 1.9 CM^2
BH CV ECHO MEAS - AVA(I,D): 1.9 CM^2
BH CV ECHO MEAS - AVA(V,A): 1.9 CM^2
BH CV ECHO MEAS - AVA(V,D): 1.9 CM^2
BH CV ECHO MEAS - BSA(HAYCOCK): 2.2 M^2
BH CV ECHO MEAS - BSA: 2.2 M^2
BH CV ECHO MEAS - BZI_BMI: 27.9 KILOGRAMS/M^2
BH CV ECHO MEAS - BZI_METRIC_HEIGHT: 182.9 CM
BH CV ECHO MEAS - BZI_METRIC_WEIGHT: 93.4 KG
BH CV ECHO MEAS - EDV(CUBED): 148.9 ML
BH CV ECHO MEAS - EDV(MOD-SP4): 99 ML
BH CV ECHO MEAS - EDV(TEICH): 135.3 ML
BH CV ECHO MEAS - EF(CUBED): 38.8 %
BH CV ECHO MEAS - EF(MOD-BP): 31 %
BH CV ECHO MEAS - EF(MOD-SP4): 31.3 %
BH CV ECHO MEAS - EF(TEICH): 31.7 %
BH CV ECHO MEAS - ESV(CUBED): 91.1 ML
BH CV ECHO MEAS - ESV(MOD-SP4): 68 ML
BH CV ECHO MEAS - ESV(TEICH): 92.4 ML
BH CV ECHO MEAS - FS: 15.1 %
BH CV ECHO MEAS - IVS/LVPW: 1
BH CV ECHO MEAS - IVSD: 1.2 CM
BH CV ECHO MEAS - LV DIASTOLIC VOL/BSA (35-75): 45.9 ML/M^2
BH CV ECHO MEAS - LV MASS(C)D: 256.6 GRAMS
BH CV ECHO MEAS - LV MASS(C)DI: 118.9 GRAMS/M^2
BH CV ECHO MEAS - LV MAX PG: 1.6 MMHG
BH CV ECHO MEAS - LV MEAN PG: 1 MMHG
BH CV ECHO MEAS - LV SYSTOLIC VOL/BSA (12-30): 31.5 ML/M^2
BH CV ECHO MEAS - LV V1 MAX: 62.8 CM/SEC
BH CV ECHO MEAS - LV V1 MEAN: 36.7 CM/SEC
BH CV ECHO MEAS - LV V1 VTI: 11.2 CM
BH CV ECHO MEAS - LVIDD: 5.3 CM
BH CV ECHO MEAS - LVIDS: 4.5 CM
BH CV ECHO MEAS - LVLD AP4: 7.8 CM
BH CV ECHO MEAS - LVLS AP4: 7.9 CM
BH CV ECHO MEAS - LVOT AREA (M): 3.1 CM^2
BH CV ECHO MEAS - LVOT AREA: 3.1 CM^2
BH CV ECHO MEAS - LVOT DIAM: 2 CM
BH CV ECHO MEAS - LVPWD: 1.2 CM
BH CV ECHO MEAS - MV A DUR: 0.14 SEC
BH CV ECHO MEAS - MV A MAX VEL: 68.9 CM/SEC
BH CV ECHO MEAS - MV DEC SLOPE: 368 CM/SEC^2
BH CV ECHO MEAS - MV DEC TIME: 315 SEC
BH CV ECHO MEAS - MV E MAX VEL: 42.2 CM/SEC
BH CV ECHO MEAS - MV E/A: 0.61
BH CV ECHO MEAS - MV MAX PG: 3.1 MMHG
BH CV ECHO MEAS - MV MEAN PG: 1 MMHG
BH CV ECHO MEAS - MV P1/2T MAX VEL: 54.3 CM/SEC
BH CV ECHO MEAS - MV P1/2T: 43.2 MSEC
BH CV ECHO MEAS - MV V2 MAX: 88.2 CM/SEC
BH CV ECHO MEAS - MV V2 MEAN: 48.7 CM/SEC
BH CV ECHO MEAS - MV V2 VTI: 18.7 CM
BH CV ECHO MEAS - MVA P1/2T LCG: 4.1 CM^2
BH CV ECHO MEAS - MVA(P1/2T): 5.1 CM^2
BH CV ECHO MEAS - MVA(VTI): 1.9 CM^2
BH CV ECHO MEAS - SI(AO): 77.4 ML/M^2
BH CV ECHO MEAS - SI(CUBED): 26.8 ML/M^2
BH CV ECHO MEAS - SI(LVOT): 16.3 ML/M^2
BH CV ECHO MEAS - SI(MOD-SP4): 14.4 ML/M^2
BH CV ECHO MEAS - SI(TEICH): 19.9 ML/M^2
BH CV ECHO MEAS - SV(AO): 167.1 ML
BH CV ECHO MEAS - SV(CUBED): 57.8 ML
BH CV ECHO MEAS - SV(LVOT): 35.2 ML
BH CV ECHO MEAS - SV(MOD-SP4): 31 ML
BH CV ECHO MEAS - SV(TEICH): 42.9 ML
GLUCOSE BLDC GLUCOMTR-MCNC: 94 MG/DL (ref 70–130)
MAXIMAL PREDICTED HEART RATE: 148 BPM
STRESS TARGET HR: 126 BPM

## 2019-05-06 PROCEDURE — 0JH606Z INSERTION OF PACEMAKER, DUAL CHAMBER INTO CHEST SUBCUTANEOUS TISSUE AND FASCIA, OPEN APPROACH: ICD-10-PCS | Performed by: INTERNAL MEDICINE

## 2019-05-06 PROCEDURE — 82962 GLUCOSE BLOOD TEST: CPT

## 2019-05-06 PROCEDURE — 25010000002 VANCOMYCIN PER 500 MG: Performed by: INTERNAL MEDICINE

## 2019-05-06 PROCEDURE — 25010000002 MIDAZOLAM PER 1 MG: Performed by: INTERNAL MEDICINE

## 2019-05-06 PROCEDURE — C1898 LEAD, PMKR, OTHER THAN TRANS: HCPCS | Performed by: INTERNAL MEDICINE

## 2019-05-06 PROCEDURE — 33208 INSRT HEART PM ATRIAL & VENT: CPT | Performed by: INTERNAL MEDICINE

## 2019-05-06 PROCEDURE — 0 IOPAMIDOL PER 1 ML: Performed by: INTERNAL MEDICINE

## 2019-05-06 PROCEDURE — 02H63JZ INSERTION OF PACEMAKER LEAD INTO RIGHT ATRIUM, PERCUTANEOUS APPROACH: ICD-10-PCS | Performed by: INTERNAL MEDICINE

## 2019-05-06 PROCEDURE — 02HK3JZ INSERTION OF PACEMAKER LEAD INTO RIGHT VENTRICLE, PERCUTANEOUS APPROACH: ICD-10-PCS | Performed by: INTERNAL MEDICINE

## 2019-05-06 PROCEDURE — C1894 INTRO/SHEATH, NON-LASER: HCPCS | Performed by: INTERNAL MEDICINE

## 2019-05-06 PROCEDURE — 99232 SBSQ HOSP IP/OBS MODERATE 35: CPT | Performed by: INTERNAL MEDICINE

## 2019-05-06 PROCEDURE — C1785 PMKR, DUAL, RATE-RESP: HCPCS | Performed by: INTERNAL MEDICINE

## 2019-05-06 PROCEDURE — 99223 1ST HOSP IP/OBS HIGH 75: CPT | Performed by: INTERNAL MEDICINE

## 2019-05-06 PROCEDURE — 99152 MOD SED SAME PHYS/QHP 5/>YRS: CPT | Performed by: INTERNAL MEDICINE

## 2019-05-06 PROCEDURE — 99153 MOD SED SAME PHYS/QHP EA: CPT | Performed by: INTERNAL MEDICINE

## 2019-05-06 PROCEDURE — 25010000002 FENTANYL CITRATE (PF) 100 MCG/2ML SOLUTION: Performed by: INTERNAL MEDICINE

## 2019-05-06 DEVICE — IMPLANTABLE DEVICE: Type: IMPLANTABLE DEVICE | Status: FUNCTIONAL

## 2019-05-06 DEVICE — PACEMAKER
Type: IMPLANTABLE DEVICE | Status: FUNCTIONAL
Brand: ACCOLADE™ MRI DR

## 2019-05-06 RX ORDER — LIDOCAINE HYDROCHLORIDE 10 MG/ML
INJECTION, SOLUTION INFILTRATION; PERINEURAL AS NEEDED
Status: DISCONTINUED | OUTPATIENT
Start: 2019-05-06 | End: 2019-05-06 | Stop reason: HOSPADM

## 2019-05-06 RX ORDER — FENTANYL CITRATE 50 UG/ML
INJECTION, SOLUTION INTRAMUSCULAR; INTRAVENOUS AS NEEDED
Status: DISCONTINUED | OUTPATIENT
Start: 2019-05-06 | End: 2019-05-06 | Stop reason: HOSPADM

## 2019-05-06 RX ORDER — SODIUM CHLORIDE 9 MG/ML
INJECTION, SOLUTION INTRAVENOUS CONTINUOUS PRN
Status: COMPLETED | OUTPATIENT
Start: 2019-05-06 | End: 2019-05-06

## 2019-05-06 RX ORDER — ACETAMINOPHEN 160 MG/5ML
650 SOLUTION ORAL EVERY 4 HOURS PRN
Status: DISCONTINUED | OUTPATIENT
Start: 2019-05-06 | End: 2019-05-07 | Stop reason: HOSPADM

## 2019-05-06 RX ORDER — ACETAMINOPHEN 325 MG/1
650 TABLET ORAL EVERY 4 HOURS PRN
Status: DISCONTINUED | OUTPATIENT
Start: 2019-05-06 | End: 2019-05-07 | Stop reason: HOSPADM

## 2019-05-06 RX ORDER — VANCOMYCIN HYDROCHLORIDE 1 G/200ML
INJECTION, SOLUTION INTRAVENOUS CONTINUOUS PRN
Status: COMPLETED | OUTPATIENT
Start: 2019-05-06 | End: 2019-05-06

## 2019-05-06 RX ORDER — ACETAMINOPHEN 650 MG/1
650 SUPPOSITORY RECTAL EVERY 4 HOURS PRN
Status: DISCONTINUED | OUTPATIENT
Start: 2019-05-06 | End: 2019-05-07 | Stop reason: HOSPADM

## 2019-05-06 RX ORDER — MIDAZOLAM HYDROCHLORIDE 1 MG/ML
INJECTION INTRAMUSCULAR; INTRAVENOUS AS NEEDED
Status: DISCONTINUED | OUTPATIENT
Start: 2019-05-06 | End: 2019-05-06 | Stop reason: HOSPADM

## 2019-05-06 RX ADMIN — SODIUM CHLORIDE, PRESERVATIVE FREE 3 ML: 5 INJECTION INTRAVENOUS at 08:05

## 2019-05-06 RX ADMIN — SODIUM CHLORIDE, PRESERVATIVE FREE 3 ML: 5 INJECTION INTRAVENOUS at 20:42

## 2019-05-06 NOTE — NURSING NOTE
Call placed to Dr Gordon regarding length of time needed for flat position and HOB up due to removal of femoral veinous sheath.  Orders received and entered as nsg communication.

## 2019-05-06 NOTE — CONSULTS
Date of Hospital Visit: [unfilled]ENC@  Encounter Provider: Bryn Gordon MD  Place of Service: [facility] CARDIOLOGY  Patient Name: Orion Zepeda  :1945  Referral Provider: Moises Saunders MD    Chief complaint  Syncope    History of Present Illness  Mr. Zepeda 73-year-old gentleman who denies any significant past medical history.  He presented to the hospital on  morning after an episode of syncope.  He had had several episodes of severe intermittent syncope, and in fact had several in the emergency room.  He was in complete heart block and the episodes of syncope were associated with long pauses.  He had a similar prior episode 1 year ago.  The episodes of syncope have been relieved with a temporary pacemaker.    Past Medical History:   Diagnosis Date   • Dislocated shoulder, right, subsequent encounter     no problems since then   • Syncope 2018    ER visit with no follow up       History reviewed. No pertinent surgical history.    No medications prior to admission.       Current Meds  Scheduled Meds:  enoxaparin 40 mg Subcutaneous Q24H   sodium chloride 3 mL Intravenous Q12H     Continuous Infusions:   PRN Meds:.•  acetaminophen  •  sodium chloride  •  zolpidem    Allergies as of 2019   • (No Known Allergies)       Social History     Socioeconomic History   • Marital status: Single     Spouse name: Not on file   • Number of children: Not on file   • Years of education: Not on file   • Highest education level: Not on file   Tobacco Use   • Smoking status: Former Smoker     Packs/day: 0.50     Years: 15.00     Pack years: 7.50   • Smokeless tobacco: Never Used   • Tobacco comment: quit in 2018   Substance and Sexual Activity   • Alcohol use: No   • Drug use: Yes     Types: Marijuana     Comment: every other evening   • Sexual activity: Defer       Family History   Problem Relation Age of Onset   • Stroke Mother    • Cancer Father      REVIEW OF SYSTEMS:  "  CONSTITUTIONAL: No weight loss, fever, chills, weakness or fatigue.   HEENT: Eyes: No visual loss, blurred vision, double vision or yellow sclerae. Ears, Nose, Throat: No hearing loss, sneezing, congestion, runny nose or sore throat.   SKIN: No rash or itching.     RESPIRATORY: No shortness of breath, hemoptysis, cough or sputum.   GASTROINTESTINAL: No anorexia, nausea, vomiting or diarrhea. No abdominal pain, bright red blood per rectum or melena.  GENITOURINARY: No burning on urination, hematuria or increased frequency.  NEUROLOGICAL: No headache, positive for dizziness and syncope, paralysis, ataxia, numbness or tingling in the extremities. No change in bowel or bladder control.   MUSCULOSKELETAL: No muscle, back pain, joint pain or stiffness.   HEMATOLOGIC: No anemia, bleeding or bruising.   LYMPHATICS: No enlarged nodes. No history of splenectomy.   PSYCHIATRIC: No history of depression, anxiety, hallucinations.   ENDOCRINOLOGIC: No reports of sweating, cold or heat intolerance. No polyuria or polydipsia.        Objective:   Temp:  [97.2 °F (36.2 °C)-99.5 °F (37.5 °C)] 98.1 °F (36.7 °C)  Heart Rate:  [] 89  Resp:  [18-25] 18  BP: ()/(55-97) 143/94  Body mass index is 28.64 kg/m².  Flowsheet Rows      First Filed Value   Admission Height  182.9 cm (72\") Documented at 05/05/2019 1138   Admission Weight  93.4 kg (206 lb) Documented at 05/05/2019 1138        Vitals:    05/06/19 0805   BP: 143/94   Pulse: 89   Resp:    Temp:    SpO2: 97%       General Appearance:    Alert, cooperative, in no acute distress   Head:    Normocephalic, without obvious abnormality, abrasion on left forehead   Eyes:            Lids and lashes normal, conjunctivae and sclerae normal, no   icterus, no pallor, corneas clear   Ears:    Ears appear intact with no abnormalities noted   Throat:   No oral lesions, no thrush, oral mucosa moist   Neck:   No adenopathy, supple, trachea midline, no thyromegaly, no   carotid bruit, no " JVD       Lungs:     Clear to auscultation,respirations regular, even and unlabored    Heart:    Regular rhythm and normal rate, normal S1 and S2, no murmur, no gallop, no rub, no click   Chest Wall:    No abnormalities observed   Abdomen:     Normal bowel sounds, no masses, no organomegaly, soft        non-tender, non-distended, no guarding, no rebound  tenderness   Extremities:   Moves all extremities well, no edema, no cyanosis, no redness   Pulses:   Pulses palpable and equal bilaterally. Normal radial, carotid, femoral, dorsalis pedis and posterior tibial pulses bilaterally. Normal abdominal aorta   Skin:  Psychiatric:   No bleeding, bruising or rash    Alert and oriented x 3, normal mood and affect                 Lab Review:    Results from last 7 days   Lab Units 05/05/19  1819   SODIUM mmol/L 142   POTASSIUM mmol/L 3.4*   CHLORIDE mmol/L 108*   CO2 mmol/L 21.6*   BUN mg/dL 16   CREATININE mg/dL 1.15   CALCIUM mg/dL 8.9   BILIRUBIN mg/dL 1.6*   ALK PHOS U/L 60   ALT (SGPT) U/L 38   AST (SGOT) U/L 15   GLUCOSE mg/dL 120*     Results from last 7 days   Lab Units 05/05/19  1142   TROPONIN T ng/mL <0.010     @LABRCNTbnp@  Results from last 7 days   Lab Units 05/05/19  1142   WBC 10*3/mm3 17.34*   HEMOGLOBIN g/dL 16.0   HEMATOCRIT % 47.2   PLATELETS 10*3/mm3 330     Results from last 7 days   Lab Units 05/05/19  1142   INR  1.02     Results from last 7 days   Lab Units 05/05/19  1819   MAGNESIUM mg/dL 2.2     @LABRCNTIP(chol,trig,hdl,ldl)    I personally viewed and interpreted the patient's EKG/Telemetry tracings.  His first EKG demonstrates complete heart block with a ventricular escape.  Subsequently he has a temporary pacemaker in place.    I reviewed his echocardiographic images.  They demonstrate a normal ejection fraction.  )  Patient Active Problem List   Diagnosis   • Third degree heart block (CMS/HCC)     Assessment and Plan:  1.  Complete heart block  2.  Syncope    Patient will need a dual-chamber  pacemaker.  We discussed the risks including infection, heart and vascular injury, pneumothorax and lead dislodgment.  He was agreeable with proceeding.    Bryn Gordon MD  05/06/19  8:32 AM.    Time spent in reviewing chart, discussion and examination: 1 hour

## 2019-05-06 NOTE — PROGRESS NOTES
"CC: CHB    Interval History:   Feels much better.    Vital Signs  Temp:  [97.2 °F (36.2 °C)-99.5 °F (37.5 °C)] 99.1 °F (37.3 °C)  Heart Rate:  [] 84  Resp:  [18-25] 18  BP: ()/(55-97) 124/83    Intake/Output Summary (Last 24 hours) at 5/6/2019 0704  Last data filed at 5/6/2019 0449  Gross per 24 hour   Intake 300 ml   Output 825 ml   Net -525 ml     Flowsheet Rows      First Filed Value   Admission Height  182.9 cm (72\") Documented at 05/05/2019 1138   Admission Weight  93.4 kg (206 lb) Documented at 05/05/2019 1138          PHYSICAL EXAM:  General: No acute distress. NG in place.  Resp:NL Rate, unlabored, clear  CV:NL rate and rhythm, NL PMI, Nl S1 and S2, no Murmur, no gallop, no rub, No JVD. Normal pedal pulses  ABD:Nl sounds, no masses or tenderness, nondistended, no guarding or rebound  Neuro: alert,cooperative and oriented  Extr: No edema or cyanosis, moves all extremities      Results Review:    Results from last 7 days   Lab Units 05/05/19  1819   SODIUM mmol/L 142   POTASSIUM mmol/L 3.4*   CHLORIDE mmol/L 108*   CO2 mmol/L 21.6*   BUN mg/dL 16   CREATININE mg/dL 1.15   GLUCOSE mg/dL 120*   CALCIUM mg/dL 8.9     Results from last 7 days   Lab Units 05/05/19  1142   TROPONIN T ng/mL <0.010     Results from last 7 days   Lab Units 05/05/19  1142   WBC 10*3/mm3 17.34*   HEMOGLOBIN g/dL 16.0   HEMATOCRIT % 47.2   PLATELETS 10*3/mm3 330     Results from last 7 days   Lab Units 05/05/19  1142   INR  1.02         Results from last 7 days   Lab Units 05/05/19  1819   MAGNESIUM mg/dL 2.2         I reviewed the patient's new clinical results.  I personally viewed and interpreted the patient's EKG/Telemetry data        Medication Review:   Meds reviewed         Assessment/Plan  1.  73-year-old gentleman with complete heart block.  He had previous trifascicular block and is most likely just progression of that.    Pacemaker today.  Echocardiogram was nondiagnostic we will need to repeat this as an " outpatient.  In addition will need outpatient perfusion stress test.  2.  Renal insufficiency may be due to low perfusion.  Stable.    Could be discharged today from our standpoint.            Moises Saunders MD  05/06/19  7:04 AM

## 2019-05-06 NOTE — NURSING NOTE
Patient returned from cath lab after repositioning of TVP. VSS- intrinsic rhythm overriding pacemaker the majority of the time. TVP settings in place as a backup.Family updated at bedside.

## 2019-05-06 NOTE — OP NOTE
Temporary pacemaker repositioned without any problems and complications    Intraoperative measurements within acceptable limits    Patient tolerated procedure well

## 2019-05-06 NOTE — DISCHARGE INSTRUCTIONS
"[facility]   [phone]    Post Pacemaker / Defibrillator Implant Instructions      1.  The dressing may be removed the next day.    2. If steri-strips were used, they should not be removed. Allow them to \"fall off\".      3. You may shower after the dressing is removed. Do not allow shower water to hit directly on incision.    4. No lotion/powder/ointment/cream on incision until it is healed.    5. Gently wash incision daily with soap and water and pat dry.    6. You may reapply a dressing if there is drainage, otherwise leave your incision open to air. If you reapply a dressing, please notify the pacemaker clinic.    7. No heavy lifting, pulling, or pushing.    8. Do not raise the affected arm over your head for a minimum of 1 month.    9. The pacemaker clinic will contact you (usually within 1 business day) to schedule a pacemaker/incision check. The check is usually done 7-10 days post-implant. If you have not heard from the pacemaker clinic within 3 days, please call the office.    10. Please call the office if you experience any of the following:   bleeding or drainage from your incision   swelling, redness, or opening of your incision   fever or chills   pain not relieved with medication   chest pain or difficulty breathing   lightheadness    11. For defibrillator patients only: If you receive a shock from your device, please call the office. If you receive 2 or more shocks within a 24 hour period OR if you receive 1 shock and feel poorly, you should be evaluated in the emergency room. Please DO NOT DRIVE if you have received a shock until your device has been checked.  "

## 2019-05-06 NOTE — PROGRESS NOTES
Discharge Planning Assessment  [facility]     Patient Name: Orion Zepeda  MRN: [id number]  Today's Date: 5/6/2019    Admit Date: 5/5/2019    Discharge Needs Assessment     Row Name 05/06/19 1154       Living Environment    Lives With  alone    Current Living Arrangements  home/apartment/condo    Primary Care Provided by  self    Provides Primary Care For  no one    Family Caregiver if Needed  child(ines), adult    Family Caregiver Names  Son Kane and son Bradley and CONRAD Hernandez    Quality of Family Relationships  helpful;involved;supportive    Able to Return to Prior Arrangements  yes       Resource/Environmental Concerns    Resource/Environmental Concerns  none    Transportation Concerns  car, none       Transition Planning    Patient/Family Anticipates Transition to  home    Patient/Family Anticipated Services at Transition  none    Transportation Anticipated  family or friend will provide       Discharge Needs Assessment    Readmission Within the Last 30 Days  no previous admission in last 30 days    Concerns to be Addressed  denies needs/concerns at this time    Equipment Currently Used at Home  none    Anticipated Changes Related to Illness  none    Equipment Needed After Discharge  none    Outpatient/Agency/Support Group Needs  -- n/a    Discharge Facility/Level of Care Needs  -- n/a    Offered/Gave Vendor List  no    Current Discharge Risk  -- Denies DC Risk        Discharge Plan     Row Name 05/06/19 1156       Plan    Plan  Home    Patient/Family in Agreement with Plan  yes    Plan Comments  Introduced self/explained role of CCP. Face sheet data review. Confirmed that pt does not have a PCP. Discussed [facility]ia[facility] and pt states he will consider it. Pt provided with information to contact them to set up a PCP. Pt states he is IADLs, lives in a house. He uses no DME. He has no Hx of HH use or SNF stay. Plan at this time is home with no needs. CCP to follow.................JW        Destination       No service coordination in this encounter.      Durable Medical Equipment      No service coordination in this encounter.      Dialysis/Infusion      No service coordination in this encounter.      Home Medical Care      No service coordination in this encounter.      Therapy      No service coordination in this encounter.      Community Resources      No service coordination in this encounter.          Demographic Summary     Row Name 05/06/19 1154       General Information    Admission Type  inpatient    Arrived From  home    Required Notices Provided  Important Message from Medicare    Referral Source  admission list    Reason for Consult  discharge planning    Preferred Language  English     Used During This Interaction  no       Contact Information    Permission Granted to Share Info With  ;family/designee        Functional Status     Row Name 05/06/19 1154       Functional Status    Usual Activity Tolerance  excellent    Current Activity Tolerance  fair       Functional Status, IADL    Medications  independent    Meal Preparation  independent    Housekeeping  independent    Laundry  independent    Shopping  independent       Mental Status    General Appearance WDL  WDL        Psychosocial    No documentation.       Abuse/Neglect    No documentation.       Legal    No documentation.       Substance Abuse    No documentation.       Patient Forms    No documentation.           Thu Poe RN

## 2019-05-07 ENCOUNTER — APPOINTMENT (OUTPATIENT)
Dept: GENERAL RADIOLOGY | Facility: HOSPITAL | Age: 74
End: 2019-05-07

## 2019-05-07 VITALS
RESPIRATION RATE: 18 BRPM | DIASTOLIC BLOOD PRESSURE: 77 MMHG | WEIGHT: 202.5 LBS | HEART RATE: 86 BPM | TEMPERATURE: 98.1 F | OXYGEN SATURATION: 97 % | SYSTOLIC BLOOD PRESSURE: 107 MMHG | HEIGHT: 72 IN | BODY MASS INDEX: 27.43 KG/M2

## 2019-05-07 PROCEDURE — 99024 POSTOP FOLLOW-UP VISIT: CPT | Performed by: INTERNAL MEDICINE

## 2019-05-07 PROCEDURE — 93005 ELECTROCARDIOGRAM TRACING: CPT | Performed by: INTERNAL MEDICINE

## 2019-05-07 PROCEDURE — 93010 ELECTROCARDIOGRAM REPORT: CPT | Performed by: INTERNAL MEDICINE

## 2019-05-07 PROCEDURE — 71046 X-RAY EXAM CHEST 2 VIEWS: CPT

## 2019-05-07 RX ADMIN — SODIUM CHLORIDE, PRESERVATIVE FREE 3 ML: 5 INJECTION INTRAVENOUS at 09:14

## 2019-05-07 NOTE — DISCHARGE SUMMARY
Date of Discharge:  5/7/2019  Date of Admit: 5/5/2019    Discharge Diagnosis:  Third degree heart block (CMS/HCC)      Hospital Course:   Patient was admitted after multiple episodes of syncope.  He had a slight injury to his left forehead, and subsequently underwent a CT scan which was normal.  He was found to be in complete heart block upon arrival to the emergency room.  He had placement of a temporary pacemaker, which subsequently had to be repositioned due to the loss of capture.  Yesterday he underwent placement of a permanent dual-chamber pacing system, but by then his conduction system had resumed one-to-one conduction.  He has intermittent complete heart block.  We programmed the pacemaker for RV avoidance pacing.  This morning the site is clean dry and intact.  He feels well.    Procedures Performed  Procedure(s):  Pacemaker DC new  [place]       Consults     Date and Time Order Name Status Description    5/6/2019 0626 Cardiac Electrophysiologist Inpatient Consult Completed           Pertinent Test Results:   CT scan of the head negative  Chest x-ray unremarkable, with leads in place.      Discharge Physical Exam:    General Appearance No acute distress, small abrasion on left forehead.   Neck No adenopathy,  trachea midline, no thyromegaly no JVD   Lungs Clear to auscultation,respirations regular, even and unlabored   Heart Regular rhythm and normal rate, normal S1 and S2, pacemaker incision is clean dry and intact, no evidence of complication.   Chest wall No abnormalities observed   Abdomen Normal bowel sounds, no masses, no hepatomegaly, soft   Extremities Moves all extremities well, no edema, no cyanosis, no redness   Neurological Alert and oriented x 3     Discharge Medications     Discharge Medications      Patient Not Prescribed Medications Upon Discharge         Discharge Diet: Regular    Activity at Discharge: Avoid lifting more than 5 pounds, avoid vigorous upper arm exercises or arm extension  with the left arm.  Daily activities are okay.    Discharge disposition: home    Condition on Discharge: stable    Follow-up Appointments  No future appointments.     Follow-up in pacemaker clinic in 1 week for incision check.  Follow-up with Dr. Gordon in one month.       Test Results Pending at Discharge       Bryn Gordon MD  05/07/19  9:33 AM    30 min spent in reviewing records, discussion and examination of the patient and discussion with other members of the patient's medical team.     Dictated utilizing Dragon dictation

## 2019-05-07 NOTE — PLAN OF CARE
Problem: Patient Care Overview  Goal: Plan of Care Review  Outcome: Ongoing (interventions implemented as appropriate)   05/07/19 0241   Coping/Psychosocial   Plan of Care Reviewed With patient   Plan of Care Review   Progress improving   OTHER   Outcome Summary Pt. ran NSR on heart monitor. Pt. had pacemaker placed yesterday 5/6/19. Right femoral site clean dry and intact no bleeding. Should d/c today.

## 2019-05-16 ENCOUNTER — CLINICAL SUPPORT NO REQUIREMENTS (OUTPATIENT)
Dept: CARDIOLOGY | Facility: CLINIC | Age: 74
End: 2019-05-16

## 2019-05-16 DIAGNOSIS — I44.2 COMPLETE HEART BLOCK (HCC): Primary | ICD-10-CM

## 2019-05-16 PROCEDURE — 93280 PM DEVICE PROGR EVAL DUAL: CPT | Performed by: INTERNAL MEDICINE

## 2019-06-27 ENCOUNTER — OFFICE VISIT (OUTPATIENT)
Dept: CARDIOLOGY | Facility: CLINIC | Age: 74
End: 2019-06-27

## 2019-06-27 ENCOUNTER — CLINICAL SUPPORT NO REQUIREMENTS (OUTPATIENT)
Dept: CARDIOLOGY | Facility: CLINIC | Age: 74
End: 2019-06-27

## 2019-06-27 VITALS
HEIGHT: 71 IN | SYSTOLIC BLOOD PRESSURE: 120 MMHG | HEART RATE: 83 BPM | DIASTOLIC BLOOD PRESSURE: 70 MMHG | WEIGHT: 218 LBS | BODY MASS INDEX: 30.52 KG/M2

## 2019-06-27 DIAGNOSIS — R55 SYNCOPE, UNSPECIFIED SYNCOPE TYPE: ICD-10-CM

## 2019-06-27 DIAGNOSIS — I44.2 COMPLETE HEART BLOCK (HCC): Primary | ICD-10-CM

## 2019-06-27 DIAGNOSIS — I44.2 THIRD DEGREE HEART BLOCK (HCC): ICD-10-CM

## 2019-06-27 PROCEDURE — 93000 ELECTROCARDIOGRAM COMPLETE: CPT | Performed by: INTERNAL MEDICINE

## 2019-06-27 PROCEDURE — 99024 POSTOP FOLLOW-UP VISIT: CPT | Performed by: INTERNAL MEDICINE

## 2019-06-27 NOTE — PROGRESS NOTES
Date of Office Visit: 2019  Encounter Provider: Bryn Gordon MD  Place of Service: [facility] CARDIOLOGY  Patient Name: Orion Zepeda  :1945    Chief Complaint   Patient presents with   • Pacemaker Check     1 mnth follow up / Pacer placed    :     HPI: Orion Zepeda is a 73 y.o. male who presents today for follow-up of pacemaker implantation for  intermittent complete heart block.  Patient reports doing well.  No recurrent episodes of fainting.  No improvement in energy.  He asked if he needs to have a repeat echo or stress test.          Past Medical History:   Diagnosis Date   • Dislocated shoulder, right, subsequent encounter     no problems since then   • Syncope 2018    ER visit with no follow up       Past Surgical History:   Procedure Laterality Date   • CARDIAC ELECTROPHYSIOLOGY PROCEDURE N/A 2019    Procedure: Temporary Pacemaker;  Surgeon: Vonnie Jeffries MD;  Location: [facility] INVASIVE LOCATION;  Service: Cardiology   • CARDIAC ELECTROPHYSIOLOGY PROCEDURE N/A 2019    Procedure: Pacemaker DC new  [place];  Surgeon: Bryn Gordon MD;  Location: [facility] CATH INVASIVE LOCATION;  Service: Cardiovascular   • CARDIAC ELECTROPHYSIOLOGY PROCEDURE N/A 2019    Procedure: Temporary Pacemaker- Reposition;  Surgeon: Vonnie Jeffries MD;  Location: [facility] INVASIVE LOCATION;  Service: Cardiology       Social History     Socioeconomic History   • Marital status: Single     Spouse name: Not on file   • Number of children: Not on file   • Years of education: Not on file   • Highest education level: Not on file   Tobacco Use   • Smoking status: Former Smoker     Packs/day: 0.50     Years: 15.00     Pack years: 7.50   • Smokeless tobacco: Never Used   • Tobacco comment: quit in 2018   Substance and Sexual Activity   • Alcohol use: No   • Drug use: Yes     Types: Marijuana     Comment: every other evening   • Sexual activity:  "Defer       Family History   Problem Relation Age of Onset   • Stroke Mother    • Cancer Father        Review of Systems   Constitution: Positive for malaise/fatigue. Negative for weakness.   HENT: Negative.    Eyes: Negative.    Cardiovascular: Negative for chest pain, dyspnea on exertion, leg swelling and near-syncope.   Respiratory: Negative for cough and shortness of breath.    Endocrine: Negative.    Hematologic/Lymphatic: Negative.    Skin: Negative.    Musculoskeletal: Negative.    Gastrointestinal: Negative.    Genitourinary: Negative.    Neurological: Negative.    Psychiatric/Behavioral: Negative.    Allergic/Immunologic: Negative.        No Known Allergies    No current outpatient medications on file.      Objective:     Vitals:    06/27/19 1140   BP: 120/70   BP Location: Right arm   Patient Position: Sitting   Cuff Size: Adult   Pulse: 83   Weight: 98.9 kg (218 lb)   Height: 180.3 cm (71\")     Body mass index is 30.4 kg/m².    PHYSICAL EXAM:    Physical Exam   Constitutional: He is oriented to person, place, and time. He appears well-developed and well-nourished.   HENT:   Head: Normocephalic and atraumatic.   Eyes: Conjunctivae are normal. Right eye exhibits no discharge. Left eye exhibits no discharge.   Neck: Neck supple. No JVD present.   Cardiovascular: Normal rate and normal heart sounds.   Pulmonary/Chest: Effort normal. No respiratory distress.   Abdominal: Soft. He exhibits no distension. There is no tenderness.   Musculoskeletal: Normal range of motion. He exhibits no edema.   Neurological: He is alert and oriented to person, place, and time. No cranial nerve deficit.   Skin: Skin is warm. He is not diaphoretic.   Psychiatric: He has a normal mood and affect. His behavior is normal.   Nursing note and vitals reviewed.          ECG 12 Lead  Date/Time: 6/27/2019 1:18 PM  Performed by: Bryn Gordon MD  Authorized by: Bryn Gordon MD   Comparison: compared with previous ECG from " 5/6/2019  Similar to previous ECG    Clinical impression: abnormal EKG  Comments: Atrial paced rhythm with ventricular conduction.  Occasional PVCs.              Assessment:       Diagnosis Plan   1. Complete heart block (CMS/HCC)  ECG 12 Lead   2. Third degree heart block (CMS/HCC)     3. Syncope, unspecified syncope type            Plan:       Patient doing well.  No further symptoms of fainting.  Pacemaker function is appropriate.  No evidence of infection.  Continue routine follow-up.    As always, it has been a pleasure to participate in your patient's care.      Sincerely,         Bryn Gordon MD

## 2019-06-28 PROBLEM — R55 SYNCOPE: Status: ACTIVE | Noted: 2018-01-01

## 2019-08-16 ENCOUNTER — CLINICAL SUPPORT NO REQUIREMENTS (OUTPATIENT)
Dept: CARDIOLOGY | Facility: CLINIC | Age: 74
End: 2019-08-16

## 2019-08-16 DIAGNOSIS — I44.2 COMPLETE HEART BLOCK (HCC): Primary | ICD-10-CM

## 2019-08-16 PROCEDURE — 93280 PM DEVICE PROGR EVAL DUAL: CPT | Performed by: INTERNAL MEDICINE

## 2019-09-15 ENCOUNTER — ANESTHESIA EVENT (OUTPATIENT)
Dept: PERIOP | Facility: HOSPITAL | Age: 74
End: 2019-09-15

## 2019-09-15 ENCOUNTER — APPOINTMENT (OUTPATIENT)
Dept: CT IMAGING | Facility: HOSPITAL | Age: 74
End: 2019-09-15

## 2019-09-15 ENCOUNTER — HOSPITAL ENCOUNTER (OUTPATIENT)
Facility: HOSPITAL | Age: 74
Discharge: HOME | End: 2019-09-16
Attending: EMERGENCY MEDICINE | Admitting: SURGERY

## 2019-09-15 ENCOUNTER — APPOINTMENT (OUTPATIENT)
Dept: GENERAL RADIOLOGY | Facility: HOSPITAL | Age: 74
End: 2019-09-15

## 2019-09-15 ENCOUNTER — ANESTHESIA (OUTPATIENT)
Dept: PERIOP | Facility: HOSPITAL | Age: 74
End: 2019-09-15

## 2019-09-15 DIAGNOSIS — K40.30 INCARCERATED LEFT INGUINAL HERNIA: Primary | ICD-10-CM

## 2019-09-15 LAB
ALBUMIN SERPL-MCNC: 4.5 G/DL (ref 3.5–5.2)
ALBUMIN/GLOB SERPL: 1.5 G/DL
ALP SERPL-CCNC: 58 U/L (ref 39–117)
ALT SERPL W P-5'-P-CCNC: 22 U/L (ref 1–41)
ANION GAP SERPL CALCULATED.3IONS-SCNC: 17.5 MMOL/L (ref 5–15)
AST SERPL-CCNC: 17 U/L (ref 1–40)
BASOPHILS # BLD AUTO: 0.04 10*3/MM3 (ref 0–0.2)
BASOPHILS NFR BLD AUTO: 0.4 % (ref 0–1.5)
BILIRUB SERPL-MCNC: 2 MG/DL (ref 0.2–1.2)
BUN BLD-MCNC: 12 MG/DL (ref 8–23)
BUN/CREAT SERPL: 10.3 (ref 7–25)
CALCIUM SPEC-SCNC: 9.5 MG/DL (ref 8.6–10.5)
CHLORIDE SERPL-SCNC: 104 MMOL/L (ref 98–107)
CO2 SERPL-SCNC: 19.5 MMOL/L (ref 22–29)
CREAT BLD-MCNC: 1.16 MG/DL (ref 0.76–1.27)
DEPRECATED RDW RBC AUTO: 42.5 FL (ref 37–54)
EOSINOPHIL # BLD AUTO: 0.12 10*3/MM3 (ref 0–0.4)
EOSINOPHIL NFR BLD AUTO: 1.2 % (ref 0.3–6.2)
ERYTHROCYTE [DISTWIDTH] IN BLOOD BY AUTOMATED COUNT: 12.3 % (ref 12.3–15.4)
GFR SERPL CREATININE-BSD FRML MDRD: 62 ML/MIN/1.73
GLOBULIN UR ELPH-MCNC: 3.1 GM/DL
GLUCOSE BLD-MCNC: 114 MG/DL (ref 65–99)
HCT VFR BLD AUTO: 47.2 % (ref 37.5–51)
HGB BLD-MCNC: 16.3 G/DL (ref 13–17.7)
IMM GRANULOCYTES # BLD AUTO: 0.06 10*3/MM3 (ref 0–0.05)
IMM GRANULOCYTES NFR BLD AUTO: 0.6 % (ref 0–0.5)
INR PPP: 1.01 (ref 0.9–1.1)
LYMPHOCYTES # BLD AUTO: 2.45 10*3/MM3 (ref 0.7–3.1)
LYMPHOCYTES NFR BLD AUTO: 24.7 % (ref 19.6–45.3)
MCH RBC QN AUTO: 32.2 PG (ref 26.6–33)
MCHC RBC AUTO-ENTMCNC: 34.5 G/DL (ref 31.5–35.7)
MCV RBC AUTO: 93.3 FL (ref 79–97)
MONOCYTES # BLD AUTO: 0.69 10*3/MM3 (ref 0.1–0.9)
MONOCYTES NFR BLD AUTO: 7 % (ref 5–12)
NEUTROPHILS # BLD AUTO: 6.56 10*3/MM3 (ref 1.7–7)
NEUTROPHILS NFR BLD AUTO: 66.1 % (ref 42.7–76)
NRBC BLD AUTO-RTO: 0 /100 WBC (ref 0–0.2)
PLATELET # BLD AUTO: 379 10*3/MM3 (ref 140–450)
PMV BLD AUTO: 9.6 FL (ref 6–12)
POTASSIUM BLD-SCNC: 3.7 MMOL/L (ref 3.5–5.2)
PROT SERPL-MCNC: 7.6 G/DL (ref 6–8.5)
PROTHROMBIN TIME: 13 SECONDS (ref 11.7–14.2)
RBC # BLD AUTO: 5.06 10*6/MM3 (ref 4.14–5.8)
SODIUM BLD-SCNC: 141 MMOL/L (ref 136–145)
WBC NRBC COR # BLD: 9.92 10*3/MM3 (ref 3.4–10.8)

## 2019-09-15 PROCEDURE — 49650 LAP ING HERNIA REPAIR INIT: CPT | Performed by: SURGERY

## 2019-09-15 PROCEDURE — 25010000003 CEFAZOLIN IN DEXTROSE 2-4 GM/100ML-% SOLUTION: Performed by: SURGERY

## 2019-09-15 PROCEDURE — C1781 MESH (IMPLANTABLE): HCPCS | Performed by: SURGERY

## 2019-09-15 PROCEDURE — 25010000002 ONDANSETRON PER 1 MG: Performed by: NURSE ANESTHETIST, CERTIFIED REGISTERED

## 2019-09-15 PROCEDURE — 49650 LAP ING HERNIA REPAIR INIT: CPT | Performed by: PHYSICIAN ASSISTANT

## 2019-09-15 PROCEDURE — 25010000002 ONDANSETRON PER 1 MG: Performed by: NURSE PRACTITIONER

## 2019-09-15 PROCEDURE — 93010 ELECTROCARDIOGRAM REPORT: CPT | Performed by: INTERNAL MEDICINE

## 2019-09-15 PROCEDURE — 96375 TX/PRO/DX INJ NEW DRUG ADDON: CPT

## 2019-09-15 PROCEDURE — 25010000002 MIDAZOLAM PER 1 MG: Performed by: ANESTHESIOLOGY

## 2019-09-15 PROCEDURE — 25010000002 PHENYLEPHRINE PER 1 ML: Performed by: NURSE ANESTHETIST, CERTIFIED REGISTERED

## 2019-09-15 PROCEDURE — 25010000002 FENTANYL CITRATE (PF) 100 MCG/2ML SOLUTION: Performed by: ANESTHESIOLOGY

## 2019-09-15 PROCEDURE — 25010000002 DEXAMETHASONE PER 1 MG: Performed by: NURSE ANESTHETIST, CERTIFIED REGISTERED

## 2019-09-15 PROCEDURE — 99219 PR INITIAL OBSERVATION CARE/DAY 50 MINUTES: CPT | Performed by: SURGERY

## 2019-09-15 PROCEDURE — 85025 COMPLETE CBC W/AUTO DIFF WBC: CPT | Performed by: NURSE PRACTITIONER

## 2019-09-15 PROCEDURE — 99284 EMERGENCY DEPT VISIT MOD MDM: CPT

## 2019-09-15 PROCEDURE — 25010000002 HYDROMORPHONE PER 4 MG: Performed by: NURSE PRACTITIONER

## 2019-09-15 PROCEDURE — 71045 X-RAY EXAM CHEST 1 VIEW: CPT

## 2019-09-15 PROCEDURE — 93005 ELECTROCARDIOGRAM TRACING: CPT | Performed by: NURSE PRACTITIONER

## 2019-09-15 PROCEDURE — 96374 THER/PROPH/DIAG INJ IV PUSH: CPT

## 2019-09-15 PROCEDURE — 25010000002 FENTANYL CITRATE (PF) 100 MCG/2ML SOLUTION: Performed by: NURSE ANESTHETIST, CERTIFIED REGISTERED

## 2019-09-15 PROCEDURE — 25010000002 IOPAMIDOL 61 % SOLUTION: Performed by: EMERGENCY MEDICINE

## 2019-09-15 PROCEDURE — 25010000002 MORPHINE PER 10 MG: Performed by: NURSE PRACTITIONER

## 2019-09-15 PROCEDURE — 74177 CT ABD & PELVIS W/CONTRAST: CPT

## 2019-09-15 PROCEDURE — 25010000002 PROPOFOL 10 MG/ML EMULSION: Performed by: NURSE ANESTHETIST, CERTIFIED REGISTERED

## 2019-09-15 PROCEDURE — 80053 COMPREHEN METABOLIC PANEL: CPT | Performed by: NURSE PRACTITIONER

## 2019-09-15 PROCEDURE — 85610 PROTHROMBIN TIME: CPT | Performed by: NURSE PRACTITIONER

## 2019-09-15 DEVICE — CLIP LIG HEMOLOK PA 6CT MD/LG GRN: Type: IMPLANTABLE DEVICE | Status: FUNCTIONAL

## 2019-09-15 DEVICE — BARD 3DMAX MESH LEFT LARGE
Type: IMPLANTABLE DEVICE | Site: ABDOMEN | Status: FUNCTIONAL
Brand: BARD 3DMAX MESH

## 2019-09-15 RX ORDER — ONDANSETRON 2 MG/ML
4 INJECTION INTRAMUSCULAR; INTRAVENOUS EVERY 6 HOURS PRN
Status: DISCONTINUED | OUTPATIENT
Start: 2019-09-15 | End: 2019-09-16 | Stop reason: HOSPADM

## 2019-09-15 RX ORDER — HYDROCODONE BITARTRATE AND ACETAMINOPHEN 10; 325 MG/1; MG/1
1 TABLET ORAL EVERY 4 HOURS PRN
Status: DISCONTINUED | OUTPATIENT
Start: 2019-09-15 | End: 2019-09-16 | Stop reason: HOSPADM

## 2019-09-15 RX ORDER — CEFAZOLIN SODIUM 2 G/100ML
2 INJECTION, SOLUTION INTRAVENOUS ONCE
Status: COMPLETED | OUTPATIENT
Start: 2019-09-15 | End: 2019-09-15

## 2019-09-15 RX ORDER — HYDROCODONE BITARTRATE AND ACETAMINOPHEN 7.5; 325 MG/1; MG/1
1 TABLET ORAL ONCE AS NEEDED
Status: DISCONTINUED | OUTPATIENT
Start: 2019-09-15 | End: 2019-09-15 | Stop reason: HOSPADM

## 2019-09-15 RX ORDER — PROMETHAZINE HYDROCHLORIDE 25 MG/1
25 TABLET ORAL ONCE AS NEEDED
Status: DISCONTINUED | OUTPATIENT
Start: 2019-09-15 | End: 2019-09-15 | Stop reason: HOSPADM

## 2019-09-15 RX ORDER — PROMETHAZINE HYDROCHLORIDE 25 MG/ML
12.5 INJECTION, SOLUTION INTRAMUSCULAR; INTRAVENOUS ONCE AS NEEDED
Status: DISCONTINUED | OUTPATIENT
Start: 2019-09-15 | End: 2019-09-15 | Stop reason: HOSPADM

## 2019-09-15 RX ORDER — PROMETHAZINE HYDROCHLORIDE 25 MG/ML
12.5 INJECTION, SOLUTION INTRAMUSCULAR; INTRAVENOUS EVERY 6 HOURS PRN
Status: DISCONTINUED | OUTPATIENT
Start: 2019-09-15 | End: 2019-09-16 | Stop reason: HOSPADM

## 2019-09-15 RX ORDER — PROMETHAZINE HYDROCHLORIDE 25 MG/1
25 SUPPOSITORY RECTAL ONCE AS NEEDED
Status: DISCONTINUED | OUTPATIENT
Start: 2019-09-15 | End: 2019-09-15 | Stop reason: HOSPADM

## 2019-09-15 RX ORDER — HYDROCODONE BITARTRATE AND ACETAMINOPHEN 5; 325 MG/1; MG/1
1 TABLET ORAL EVERY 4 HOURS PRN
Status: DISCONTINUED | OUTPATIENT
Start: 2019-09-15 | End: 2019-09-16 | Stop reason: HOSPADM

## 2019-09-15 RX ORDER — NALOXONE HCL 0.4 MG/ML
0.1 VIAL (ML) INJECTION
Status: DISCONTINUED | OUTPATIENT
Start: 2019-09-15 | End: 2019-09-16 | Stop reason: HOSPADM

## 2019-09-15 RX ORDER — ONDANSETRON 2 MG/ML
4 INJECTION INTRAMUSCULAR; INTRAVENOUS ONCE AS NEEDED
Status: DISCONTINUED | OUTPATIENT
Start: 2019-09-15 | End: 2019-09-15 | Stop reason: HOSPADM

## 2019-09-15 RX ORDER — FENTANYL CITRATE 50 UG/ML
100 INJECTION, SOLUTION INTRAMUSCULAR; INTRAVENOUS
Status: DISCONTINUED | OUTPATIENT
Start: 2019-09-15 | End: 2019-09-15 | Stop reason: HOSPADM

## 2019-09-15 RX ORDER — ONDANSETRON 4 MG/1
4 TABLET, FILM COATED ORAL EVERY 6 HOURS PRN
Status: DISCONTINUED | OUTPATIENT
Start: 2019-09-15 | End: 2019-09-16 | Stop reason: HOSPADM

## 2019-09-15 RX ORDER — DEXAMETHASONE SODIUM PHOSPHATE 10 MG/ML
INJECTION INTRAMUSCULAR; INTRAVENOUS AS NEEDED
Status: DISCONTINUED | OUTPATIENT
Start: 2019-09-15 | End: 2019-09-15 | Stop reason: SURG

## 2019-09-15 RX ORDER — HYDROMORPHONE HYDROCHLORIDE 1 MG/ML
0.5 INJECTION, SOLUTION INTRAMUSCULAR; INTRAVENOUS; SUBCUTANEOUS ONCE
Status: COMPLETED | OUTPATIENT
Start: 2019-09-15 | End: 2019-09-15

## 2019-09-15 RX ORDER — SODIUM CHLORIDE 0.9 % (FLUSH) 0.9 %
10 SYRINGE (ML) INJECTION AS NEEDED
Status: DISCONTINUED | OUTPATIENT
Start: 2019-09-15 | End: 2019-09-16 | Stop reason: HOSPADM

## 2019-09-15 RX ORDER — FENTANYL CITRATE 50 UG/ML
INJECTION, SOLUTION INTRAMUSCULAR; INTRAVENOUS
Status: COMPLETED
Start: 2019-09-15 | End: 2019-09-15

## 2019-09-15 RX ORDER — ACETAMINOPHEN 325 MG/1
650 TABLET ORAL ONCE AS NEEDED
Status: DISCONTINUED | OUTPATIENT
Start: 2019-09-15 | End: 2019-09-15 | Stop reason: HOSPADM

## 2019-09-15 RX ORDER — FLUMAZENIL 0.1 MG/ML
0.2 INJECTION INTRAVENOUS AS NEEDED
Status: DISCONTINUED | OUTPATIENT
Start: 2019-09-15 | End: 2019-09-15 | Stop reason: HOSPADM

## 2019-09-15 RX ORDER — MORPHINE SULFATE 2 MG/ML
4 INJECTION, SOLUTION INTRAMUSCULAR; INTRAVENOUS
Status: DISCONTINUED | OUTPATIENT
Start: 2019-09-15 | End: 2019-09-16 | Stop reason: HOSPADM

## 2019-09-15 RX ORDER — SODIUM CHLORIDE 0.9 % (FLUSH) 0.9 %
3-10 SYRINGE (ML) INJECTION AS NEEDED
Status: DISCONTINUED | OUTPATIENT
Start: 2019-09-15 | End: 2019-09-15 | Stop reason: HOSPADM

## 2019-09-15 RX ORDER — DIPHENHYDRAMINE HCL 25 MG
25 CAPSULE ORAL
Status: DISCONTINUED | OUTPATIENT
Start: 2019-09-15 | End: 2019-09-15 | Stop reason: HOSPADM

## 2019-09-15 RX ORDER — MAGNESIUM HYDROXIDE 1200 MG/15ML
LIQUID ORAL AS NEEDED
Status: DISCONTINUED | OUTPATIENT
Start: 2019-09-15 | End: 2019-09-15 | Stop reason: HOSPADM

## 2019-09-15 RX ORDER — FAMOTIDINE 10 MG/ML
20 INJECTION, SOLUTION INTRAVENOUS ONCE
Status: COMPLETED | OUTPATIENT
Start: 2019-09-15 | End: 2019-09-15

## 2019-09-15 RX ORDER — SODIUM CHLORIDE, SODIUM LACTATE, POTASSIUM CHLORIDE, CALCIUM CHLORIDE 600; 310; 30; 20 MG/100ML; MG/100ML; MG/100ML; MG/100ML
100 INJECTION, SOLUTION INTRAVENOUS CONTINUOUS
Status: DISCONTINUED | OUTPATIENT
Start: 2019-09-15 | End: 2019-09-16 | Stop reason: HOSPADM

## 2019-09-15 RX ORDER — OXYCODONE AND ACETAMINOPHEN 7.5; 325 MG/1; MG/1
1 TABLET ORAL ONCE AS NEEDED
Status: DISCONTINUED | OUTPATIENT
Start: 2019-09-15 | End: 2019-09-15 | Stop reason: HOSPADM

## 2019-09-15 RX ORDER — NALOXONE HCL 0.4 MG/ML
0.2 VIAL (ML) INJECTION AS NEEDED
Status: DISCONTINUED | OUTPATIENT
Start: 2019-09-15 | End: 2019-09-15 | Stop reason: HOSPADM

## 2019-09-15 RX ORDER — PROPOFOL 10 MG/ML
VIAL (ML) INTRAVENOUS AS NEEDED
Status: DISCONTINUED | OUTPATIENT
Start: 2019-09-15 | End: 2019-09-15 | Stop reason: SURG

## 2019-09-15 RX ORDER — SODIUM CHLORIDE, SODIUM LACTATE, POTASSIUM CHLORIDE, CALCIUM CHLORIDE 600; 310; 30; 20 MG/100ML; MG/100ML; MG/100ML; MG/100ML
9 INJECTION, SOLUTION INTRAVENOUS CONTINUOUS
Status: DISCONTINUED | OUTPATIENT
Start: 2019-09-15 | End: 2019-09-15

## 2019-09-15 RX ORDER — SODIUM CHLORIDE 0.9 % (FLUSH) 0.9 %
3 SYRINGE (ML) INJECTION EVERY 12 HOURS SCHEDULED
Status: DISCONTINUED | OUTPATIENT
Start: 2019-09-15 | End: 2019-09-15 | Stop reason: HOSPADM

## 2019-09-15 RX ORDER — DIPHENHYDRAMINE HYDROCHLORIDE 50 MG/ML
12.5 INJECTION INTRAMUSCULAR; INTRAVENOUS
Status: DISCONTINUED | OUTPATIENT
Start: 2019-09-15 | End: 2019-09-15 | Stop reason: HOSPADM

## 2019-09-15 RX ORDER — HYDRALAZINE HYDROCHLORIDE 20 MG/ML
5 INJECTION INTRAMUSCULAR; INTRAVENOUS
Status: DISCONTINUED | OUTPATIENT
Start: 2019-09-15 | End: 2019-09-15 | Stop reason: HOSPADM

## 2019-09-15 RX ORDER — LIDOCAINE HYDROCHLORIDE 10 MG/ML
0.5 INJECTION, SOLUTION EPIDURAL; INFILTRATION; INTRACAUDAL; PERINEURAL ONCE AS NEEDED
Status: DISCONTINUED | OUTPATIENT
Start: 2019-09-15 | End: 2019-09-15 | Stop reason: HOSPADM

## 2019-09-15 RX ORDER — ROCURONIUM BROMIDE 10 MG/ML
INJECTION, SOLUTION INTRAVENOUS AS NEEDED
Status: DISCONTINUED | OUTPATIENT
Start: 2019-09-15 | End: 2019-09-15 | Stop reason: SURG

## 2019-09-15 RX ORDER — EPHEDRINE SULFATE 50 MG/ML
5 INJECTION, SOLUTION INTRAVENOUS ONCE AS NEEDED
Status: DISCONTINUED | OUTPATIENT
Start: 2019-09-15 | End: 2019-09-15 | Stop reason: HOSPADM

## 2019-09-15 RX ORDER — MIDAZOLAM HYDROCHLORIDE 1 MG/ML
1 INJECTION INTRAMUSCULAR; INTRAVENOUS
Status: DISCONTINUED | OUTPATIENT
Start: 2019-09-15 | End: 2019-09-15 | Stop reason: HOSPADM

## 2019-09-15 RX ORDER — ACETAMINOPHEN 325 MG/1
650 TABLET ORAL EVERY 4 HOURS PRN
Status: DISCONTINUED | OUTPATIENT
Start: 2019-09-15 | End: 2019-09-16 | Stop reason: HOSPADM

## 2019-09-15 RX ORDER — MORPHINE SULFATE 2 MG/ML
4 INJECTION, SOLUTION INTRAMUSCULAR; INTRAVENOUS ONCE
Status: COMPLETED | OUTPATIENT
Start: 2019-09-15 | End: 2019-09-15

## 2019-09-15 RX ORDER — ONDANSETRON 2 MG/ML
INJECTION INTRAMUSCULAR; INTRAVENOUS AS NEEDED
Status: DISCONTINUED | OUTPATIENT
Start: 2019-09-15 | End: 2019-09-15 | Stop reason: SURG

## 2019-09-15 RX ORDER — PROMETHAZINE HYDROCHLORIDE 25 MG/ML
6.25 INJECTION, SOLUTION INTRAMUSCULAR; INTRAVENOUS
Status: DISCONTINUED | OUTPATIENT
Start: 2019-09-15 | End: 2019-09-15 | Stop reason: HOSPADM

## 2019-09-15 RX ORDER — ACETAMINOPHEN 160 MG/5ML
650 SOLUTION ORAL EVERY 4 HOURS PRN
Status: DISCONTINUED | OUTPATIENT
Start: 2019-09-15 | End: 2019-09-16 | Stop reason: HOSPADM

## 2019-09-15 RX ORDER — MIDAZOLAM HYDROCHLORIDE 1 MG/ML
2 INJECTION INTRAMUSCULAR; INTRAVENOUS
Status: DISCONTINUED | OUTPATIENT
Start: 2019-09-15 | End: 2019-09-15 | Stop reason: HOSPADM

## 2019-09-15 RX ORDER — SODIUM CHLORIDE 0.9 % (FLUSH) 0.9 %
3 SYRINGE (ML) INJECTION EVERY 12 HOURS SCHEDULED
Status: DISCONTINUED | OUTPATIENT
Start: 2019-09-15 | End: 2019-09-16 | Stop reason: HOSPADM

## 2019-09-15 RX ORDER — HYDROMORPHONE HYDROCHLORIDE 1 MG/ML
0.5 INJECTION, SOLUTION INTRAMUSCULAR; INTRAVENOUS; SUBCUTANEOUS
Status: DISCONTINUED | OUTPATIENT
Start: 2019-09-15 | End: 2019-09-16 | Stop reason: HOSPADM

## 2019-09-15 RX ORDER — ONDANSETRON 2 MG/ML
4 INJECTION INTRAMUSCULAR; INTRAVENOUS ONCE
Status: COMPLETED | OUTPATIENT
Start: 2019-09-15 | End: 2019-09-15

## 2019-09-15 RX ORDER — FENTANYL CITRATE 50 UG/ML
50 INJECTION, SOLUTION INTRAMUSCULAR; INTRAVENOUS
Status: DISCONTINUED | OUTPATIENT
Start: 2019-09-15 | End: 2019-09-15 | Stop reason: HOSPADM

## 2019-09-15 RX ORDER — BUPIVACAINE HYDROCHLORIDE AND EPINEPHRINE 5; 5 MG/ML; UG/ML
INJECTION, SOLUTION PERINEURAL AS NEEDED
Status: DISCONTINUED | OUTPATIENT
Start: 2019-09-15 | End: 2019-09-15 | Stop reason: HOSPADM

## 2019-09-15 RX ORDER — MEPERIDINE HYDROCHLORIDE 25 MG/ML
12.5 INJECTION INTRAMUSCULAR; INTRAVENOUS; SUBCUTANEOUS
Status: DISCONTINUED | OUTPATIENT
Start: 2019-09-15 | End: 2019-09-15 | Stop reason: HOSPADM

## 2019-09-15 RX ORDER — ACETAMINOPHEN 650 MG/1
650 SUPPOSITORY RECTAL EVERY 4 HOURS PRN
Status: DISCONTINUED | OUTPATIENT
Start: 2019-09-15 | End: 2019-09-16 | Stop reason: HOSPADM

## 2019-09-15 RX ORDER — NALOXONE HCL 0.4 MG/ML
0.4 VIAL (ML) INJECTION
Status: DISCONTINUED | OUTPATIENT
Start: 2019-09-15 | End: 2019-09-16 | Stop reason: HOSPADM

## 2019-09-15 RX ORDER — HYDROMORPHONE HYDROCHLORIDE 1 MG/ML
0.5 INJECTION, SOLUTION INTRAMUSCULAR; INTRAVENOUS; SUBCUTANEOUS
Status: DISCONTINUED | OUTPATIENT
Start: 2019-09-15 | End: 2019-09-15 | Stop reason: HOSPADM

## 2019-09-15 RX ADMIN — MIDAZOLAM 1 MG: 1 INJECTION INTRAMUSCULAR; INTRAVENOUS at 15:06

## 2019-09-15 RX ADMIN — FENTANYL CITRATE 50 MCG: 50 INJECTION INTRAMUSCULAR; INTRAVENOUS at 18:19

## 2019-09-15 RX ADMIN — FENTANYL CITRATE 50 MCG: 50 INJECTION INTRAMUSCULAR; INTRAVENOUS at 16:04

## 2019-09-15 RX ADMIN — SODIUM CHLORIDE, POTASSIUM CHLORIDE, SODIUM LACTATE AND CALCIUM CHLORIDE 100 ML/HR: 600; 310; 30; 20 INJECTION, SOLUTION INTRAVENOUS at 20:00

## 2019-09-15 RX ADMIN — ONDANSETRON 4 MG: 2 INJECTION INTRAMUSCULAR; INTRAVENOUS at 11:50

## 2019-09-15 RX ADMIN — CEFAZOLIN SODIUM 2 G: 2 INJECTION, SOLUTION INTRAVENOUS at 16:12

## 2019-09-15 RX ADMIN — SODIUM CHLORIDE 1000 ML: 9 INJECTION, SOLUTION INTRAVENOUS at 11:49

## 2019-09-15 RX ADMIN — HYDROMORPHONE HYDROCHLORIDE 0.5 MG: 1 INJECTION, SOLUTION INTRAMUSCULAR; INTRAVENOUS; SUBCUTANEOUS at 13:07

## 2019-09-15 RX ADMIN — MORPHINE SULFATE 4 MG: 2 INJECTION, SOLUTION INTRAMUSCULAR; INTRAVENOUS at 11:50

## 2019-09-15 RX ADMIN — FENTANYL CITRATE 50 MCG: 50 INJECTION INTRAMUSCULAR; INTRAVENOUS at 18:33

## 2019-09-15 RX ADMIN — MIDAZOLAM 1 MG: 1 INJECTION INTRAMUSCULAR; INTRAVENOUS at 15:15

## 2019-09-15 RX ADMIN — SODIUM CHLORIDE, POTASSIUM CHLORIDE, SODIUM LACTATE AND CALCIUM CHLORIDE: 600; 310; 30; 20 INJECTION, SOLUTION INTRAVENOUS at 16:49

## 2019-09-15 RX ADMIN — FENTANYL CITRATE 50 MCG: 50 INJECTION INTRAMUSCULAR; INTRAVENOUS at 16:49

## 2019-09-15 RX ADMIN — PHENYLEPHRINE HYDROCHLORIDE 200 MCG: 10 INJECTION INTRAVENOUS at 16:16

## 2019-09-15 RX ADMIN — ROCURONIUM BROMIDE 10 MG: 10 INJECTION INTRAVENOUS at 17:15

## 2019-09-15 RX ADMIN — PHENYLEPHRINE HYDROCHLORIDE 200 MCG: 10 INJECTION INTRAVENOUS at 17:15

## 2019-09-15 RX ADMIN — ONDANSETRON 4 MG: 2 INJECTION INTRAMUSCULAR; INTRAVENOUS at 16:25

## 2019-09-15 RX ADMIN — DEXAMETHASONE SODIUM PHOSPHATE 6 MG: 10 INJECTION INTRAMUSCULAR; INTRAVENOUS at 16:28

## 2019-09-15 RX ADMIN — FAMOTIDINE 20 MG: 10 INJECTION, SOLUTION INTRAVENOUS at 15:06

## 2019-09-15 RX ADMIN — FENTANYL CITRATE 50 MCG: 50 INJECTION INTRAMUSCULAR; INTRAVENOUS at 16:32

## 2019-09-15 RX ADMIN — ROCURONIUM BROMIDE 40 MG: 10 INJECTION INTRAVENOUS at 16:04

## 2019-09-15 RX ADMIN — PROPOFOL 200 MG: 10 INJECTION, EMULSION INTRAVENOUS at 16:04

## 2019-09-15 RX ADMIN — FENTANYL CITRATE 25 MCG: 50 INJECTION INTRAMUSCULAR; INTRAVENOUS at 17:36

## 2019-09-15 RX ADMIN — SUGAMMADEX 180 MG: 100 INJECTION, SOLUTION INTRAVENOUS at 17:35

## 2019-09-15 RX ADMIN — SODIUM CHLORIDE, POTASSIUM CHLORIDE, SODIUM LACTATE AND CALCIUM CHLORIDE 9 ML/HR: 600; 310; 30; 20 INJECTION, SOLUTION INTRAVENOUS at 15:06

## 2019-09-15 RX ADMIN — IOPAMIDOL 85 ML: 612 INJECTION, SOLUTION INTRAVENOUS at 12:58

## 2019-09-15 NOTE — ANESTHESIA PREPROCEDURE EVALUATION
Anesthesia Evaluation     Patient summary reviewed and Nursing notes reviewed   NPO Solid Status: > 8 hours             Airway   Mallampati: II  TM distance: >3 FB  Neck ROM: full  no difficulty expected  Dental - normal exam   (+) poor dentition    Comment: 3 teeth, 1 loose      Pulmonary - negative pulmonary ROS and normal exam   Cardiovascular - normal exam    (+) pacemaker pacemaker,     ROS comment: Complete heart block    Neuro/Psych  (+) syncope,     GI/Hepatic/Renal/Endo - negative ROS     Musculoskeletal (-) negative ROS    Abdominal  - normal exam   Substance History   (+) drug use      Comment: Marijuana every other evening   OB/GYN negative ob/gyn ROS         Other                      Anesthesia Plan    ASA 4 - emergent     general     intravenous induction   Anesthetic plan, all risks, benefits, and alternatives have been provided, discussed and informed consent has been obtained with: patient.    Plan discussed with CRNA.

## 2019-09-15 NOTE — ANESTHESIA PROCEDURE NOTES
Airway  Urgency: elective    Date/Time: 9/15/2019 4:07 PM  Airway not difficult    General Information and Staff    Patient location during procedure: OR  CRNA: Annabelle Banks CRNA    Indications and Patient Condition  Indications for airway management: airway protection    Preoxygenated: yes  Mask difficulty assessment: 1 - vent by mask    Final Airway Details  Final airway type: endotracheal airway      Successful airway: ETT  Cuffed: yes   Successful intubation technique: direct laryngoscopy  Endotracheal tube insertion site: oral  Blade: Heidi  Blade size: 4  ETT size (mm): 7.5  Cormack-Lehane Classification: grade I - full view of glottis  Placement verified by: chest auscultation and capnometry   Cuff volume (mL): 5  Measured from: lips  ETT/EBT  to lips (cm): 21  Number of attempts at approach: 1    Additional Comments  Smooth IV induction. Trachea intubated. Cuff up. Dentition intact without injury.

## 2019-09-15 NOTE — ED PROVIDER NOTES
MD ATTESTATION NOTE    The CLARIBEL and I have discussed this patient's history, physical exam, and treatment plan.  I have reviewed the documentation and personally had a face to face interaction with the patient. I affirm the documentation and agree with the treatment and plan.  The attached note describes my personal findings.      History  74-year-old male with 2 days of abdominal pain.    Physical Exam  Vital Signs reviewed  Alert, Well Appearing in NAD  There is a large, likely chronic left inguinal hernia.  There is mild tenderness to palpation.    Disposition  I reviewed patient's labs which are fairly unremarkable.  White blood count normal bicarbonate 19.5.  CT scan of abdomen is still pending and will help us determine should this need to be fixed today or electively as an outpatient.     Carlos Shi MD  09/15/19 [id number]

## 2019-09-15 NOTE — ED TRIAGE NOTES
Pt c/o lower abd cramping that began 2 days ago. Endorses intermittent nausea and diarrhea. Pt arrives aaox4, abc's intact, ambulatory, slightly diaphoretic.

## 2019-09-15 NOTE — ED PROVIDER NOTES
"EMERGENCY DEPARTMENT ENCOUNTER    Room Number:  [facility]/MAIN OR  Date seen:  9/15/2019  Time seen: 11:40 AM  PCP: Provider, No Known    HPI:  Chief complaint: Abd pain  Context:Orion Zepeda is a 74 y.o. male who presents to the ED with c/o lower L abd pain that started 2 days ago. Pt also c/o nausea. Pt denies vomiting, testicular pain, diarrhea, fever and chills. Pt reports having a known hernia without prior complications, as he states \"I've had a lump in my stomach for the past 25 years\".  Over the past couple of days, he started developing intermittent abd \"cramping\" around the hernia location. He has tried to manipulate the hernia several times without success, so he decided to come to the ED for further evaluation. Pt has had 2 normal BM today. Hx 3rd degree heart blockage and syncope    ALLERGIES  Patient has no known allergies.    PAST MEDICAL HISTORY  Active Ambulatory Problems     Diagnosis Date Noted   • Third degree heart block (CMS/HCC) 05/05/2019   • Syncope 01/01/2018     Resolved Ambulatory Problems     Diagnosis Date Noted   • No Resolved Ambulatory Problems     Past Medical History:   Diagnosis Date   • Complete heart block (CMS/HCC)    • Dislocated shoulder, right, subsequent encounter 2011   • Syncope 2018       PAST SURGICAL HISTORY  Past Surgical History:   Procedure Laterality Date   • CARDIAC ELECTROPHYSIOLOGY PROCEDURE N/A 5/5/2019    Procedure: Temporary Pacemaker;  Surgeon: Vonnie Jeffries MD;  Location: [facility] CATH INVASIVE LOCATION;  Service: Cardiology   • CARDIAC ELECTROPHYSIOLOGY PROCEDURE N/A 5/6/2019    Procedure: Pacemaker DC new  [place];  Surgeon: Bryn Gordon MD;  Location: [facility] CATH INVASIVE LOCATION;  Service: Cardiovascular   • CARDIAC ELECTROPHYSIOLOGY PROCEDURE N/A 5/5/2019    Procedure: Temporary Pacemaker- Reposition;  Surgeon: Vonnie Jeffries MD;  Location: [facility] CATH INVASIVE LOCATION;  Service: Cardiology   • PACEMAKER IMPLANTATION     • " SHOULDER SURGERY         FAMILY HISTORY  Family History   Problem Relation Age of Onset   • Stroke Mother    • Cancer Father        SOCIAL HISTORY  Social History     Socioeconomic History   • Marital status: Single     Spouse name: Not on file   • Number of children: Not on file   • Years of education: Not on file   • Highest education level: Not on file   Tobacco Use   • Smoking status: Former Smoker     Packs/day: 0.50     Years: 15.00     Pack years: 7.50   • Smokeless tobacco: Never Used   • Tobacco comment: quit in fall 2018   Substance and Sexual Activity   • Alcohol use: No   • Drug use: Yes     Types: Marijuana     Comment: every other evening   • Sexual activity: Defer       REVIEW OF SYSTEMS  Review of Systems   Constitutional: Negative for activity change, appetite change, diaphoresis and fever.   HENT: Negative for trouble swallowing.    Eyes: Negative for visual disturbance.   Respiratory: Negative for cough, chest tightness, shortness of breath and wheezing.    Cardiovascular: Negative for chest pain, palpitations and leg swelling.   Gastrointestinal: Positive for abdominal pain (L lower) and nausea. Negative for diarrhea and vomiting.   Genitourinary: Negative for dysuria and testicular pain.   Musculoskeletal: Negative for back pain.   Skin: Negative for rash.   Neurological: Negative for dizziness, speech difficulty and light-headedness.       PHYSICAL EXAM  ED Triage Vitals   Temp Heart Rate Resp BP SpO2   09/15/19 1133 09/15/19 1133 09/15/19 1133 09/15/19 1139 09/15/19 1133   97.3 °F (36.3 °C) 63 17 145/100 96 %      Temp src Heart Rate Source Patient Position BP Location FiO2 (%)   09/15/19 1133 -- -- -- --   Tympanic         Physical Exam   Constitutional: He is oriented to person, place, and time and well-developed, well-nourished, and in no distress. He appears distressed (Pt appears uncomfortable).   HENT:   Head: Normocephalic and atraumatic.   Eyes: Pupils are equal, round, and reactive to  light.   Neck: Normal range of motion. Neck supple.   Cardiovascular: Normal rate, regular rhythm, S1 normal, S2 normal and normal heart sounds. Exam reveals no gallop and no friction rub.   No murmur heard.  Pulmonary/Chest: Effort normal and breath sounds normal. No respiratory distress. He has no decreased breath sounds. He has no wheezes. He has no rales. He exhibits no tenderness.   Pt has a healed pace maker site to the L chest wall   Abdominal: Soft. He exhibits mass (L inguinal that is consistent with a hernia). There is no rebound and no guarding.   Genitourinary:   Genitourinary Comments: Testicles are distended   Musculoskeletal: Normal range of motion. He exhibits no deformity.   Lymphadenopathy:     He has no cervical adenopathy.   Neurological: He is alert and oriented to person, place, and time.   Skin: Skin is warm and dry.   Psychiatric: Affect normal.   Nursing note and vitals reviewed.      LAB RESULTS  Recent Results (from the past 24 hour(s))   Comprehensive Metabolic Panel    Collection Time: 09/15/19 11:52 AM   Result Value Ref Range    Glucose 114 (H) 65 - 99 mg/dL    BUN 12 8 - 23 mg/dL    Creatinine 1.16 0.76 - 1.27 mg/dL    Sodium 141 136 - 145 mmol/L    Potassium 3.7 3.5 - 5.2 mmol/L    Chloride 104 98 - 107 mmol/L    CO2 19.5 (L) 22.0 - 29.0 mmol/L    Calcium 9.5 8.6 - 10.5 mg/dL    Total Protein 7.6 6.0 - 8.5 g/dL    Albumin 4.50 3.50 - 5.20 g/dL    ALT (SGPT) 22 1 - 41 U/L    AST (SGOT) 17 1 - 40 U/L    Alkaline Phosphatase 58 39 - 117 U/L    Total Bilirubin 2.0 (H) 0.2 - 1.2 mg/dL    eGFR Non African Amer 62 >60 mL/min/1.73    Globulin 3.1 gm/dL    A/G Ratio 1.5 g/dL    BUN/Creatinine Ratio 10.3 7.0 - 25.0    Anion Gap 17.5 (H) 5.0 - 15.0 mmol/L   CBC Auto Differential    Collection Time: 09/15/19 11:52 AM   Result Value Ref Range    WBC 9.92 3.40 - 10.80 10*3/mm3    RBC 5.06 4.14 - 5.80 10*6/mm3    Hemoglobin 16.3 13.0 - 17.7 g/dL    Hematocrit 47.2 37.5 - 51.0 %    MCV 93.3 79.0 -  97.0 fL    MCH 32.2 26.6 - 33.0 pg    MCHC 34.5 31.5 - 35.7 g/dL    RDW 12.3 12.3 - 15.4 %    RDW-SD 42.5 37.0 - 54.0 fl    MPV 9.6 6.0 - 12.0 fL    Platelets 379 140 - 450 10*3/mm3    Neutrophil % 66.1 42.7 - 76.0 %    Lymphocyte % 24.7 19.6 - 45.3 %    Monocyte % 7.0 5.0 - 12.0 %    Eosinophil % 1.2 0.3 - 6.2 %    Basophil % 0.4 0.0 - 1.5 %    Immature Grans % 0.6 (H) 0.0 - 0.5 %    Neutrophils, Absolute 6.56 1.70 - 7.00 10*3/mm3    Lymphocytes, Absolute 2.45 0.70 - 3.10 10*3/mm3    Monocytes, Absolute 0.69 0.10 - 0.90 10*3/mm3    Eosinophils, Absolute 0.12 0.00 - 0.40 10*3/mm3    Basophils, Absolute 0.04 0.00 - 0.20 10*3/mm3    Immature Grans, Absolute 0.06 (H) 0.00 - 0.05 10*3/mm3    nRBC 0.0 0.0 - 0.2 /100 WBC   Protime-INR    Collection Time: 09/15/19  1:28 PM   Result Value Ref Range    Protime 13.0 11.7 - 14.2 Seconds    INR 1.01 0.90 - 1.10       I ordered the above labs and reviewed the results    RADIOLOGY  XR Chest 1 View   Final Result      CT Abdomen Pelvis With Contrast   Final Result        CT ABDOMEN PELVIS WITH CONTRAST-     Radiation dose reduction techniques were utilized, including automated  exposure control and exposure modulation based on body size.     CLINICAL: Possible incarcerated left inguinal hernia.     COMPARISON: None.     FINDINGS: There is a left-sided inguinal hernia demonstrated. There are  dilated loops of small bowel leading to this location and there is edema  demonstrated within the hernia sac. Findings compatible with  incarcerated inguinal hernia. Segment of small bowel within the hernia  demonstrates a thickened wall. There is some torsion of the mesentery as  it enters the hernia sac.     The colon caliber is within normal limits. There is diverticulosis of  the colon. The urinary bladder is satisfactory in appearance.     3.6 cm infrarenal aortic aneurysm.     The liver, gallbladder, pancreas, and spleen have a satisfactory  appearance, no biliary duct dilatation nor  adrenal abnormality. Both  kidneys demonstrate asymmetric satisfactory pattern of enhancement. Few  tiny renal cortical cysts noted, no calculus or obstructive uropathy. No  free intraperitoneal air or free fluid.     CONCLUSION: Incarcerated left inguinal hernia causing small bowel  obstruction.     Findings of this report called to Sridevi Villegas in the emergency room at  the time of completion, 1:15 PM.        This report was finalized on 9/15/2019 1:53 PM by Dr. Og Garcia M.D.  I ordered the above noted radiological studies and reviewed the images on the PACS system.  Spoke with Dr. Garcia regarding CT/MRI scan results    MEDICATIONS GIVEN IN ER  Medications   sodium chloride 0.9 % flush 10 mL ( Intravenous MAR Hold 9/15/19 1434)   sodium chloride 0.9 % flush 3 mL (not administered)   sodium chloride 0.9 % flush 3-10 mL (not administered)   lidocaine PF 1% (XYLOCAINE) injection 0.5 mL (not administered)   lactated ringers infusion (9 mL/hr Intravenous New Bag 9/15/19 1506)   fentaNYL citrate (PF) (SUBLIMAZE) injection 100 mcg (not administered)   midazolam (VERSED) injection 1 mg (1 mg Intravenous Given 9/15/19 1515)     Or   midazolam (VERSED) injection 2 mg ( Intravenous Not Given:  See Alt 9/15/19 1515)   ceFAZolin in dextrose (ANCEF) IVPB solution 2 g (not administered)   sodium chloride 0.9 % bolus 1,000 mL (0 mL Intravenous Stopped 9/15/19 1400)   morphine injection 4 mg (4 mg Intravenous Given 9/15/19 1150)   ondansetron (ZOFRAN) injection 4 mg (4 mg Intravenous Given 9/15/19 1150)   HYDROmorphone (DILAUDID) injection 0.5 mg (0.5 mg Intravenous Given 9/15/19 1307)   iopamidol (ISOVUE-300) 61 % injection 100 mL (85 mL Intravenous Given by Other 9/15/19 1258)   famotidine (PEPCID) injection 20 mg (20 mg Intravenous Given 9/15/19 1506)       EKG    EKG          EKG time: 1326  Rhythm/Rate: SR, 87  P waves and GA: Significantly prolonged FAY  QRS, axis: LAFB, RBBB   ST and T waves: no significant ST  "elevations.      Interpreted Contemporaneously by me, independently viewed  FAY is longer when compared to prior 5/7/19      COURSE & MEDICAL DECISION MAKING  Pertinent Labs and Imaging studies that were ordered and reviewed are noted above.  Results were reviewed/discussed with the patient and they were also made aware of online access.  Pt also made aware that some labs, such as cultures, will not be resulted during ER visit and follow up with PMD is necessary.     PROGRESS AND CONSULTS    Progress Notes:    ED Course as of Sep 15 1546   Sun Sep 15, 2019   1319 Spoke with Dr. Garcia, Radiologist regarding CT scan.  Pt with incarcerated left inguinal hernia.  Segment of small bowel looped and wall is thickened and there is small bowel dilated leading up to the hernia.   [EP]      ED Course User Index  [EP] Sridevi Villegas, APRN     1146 Zofran ordered. Morphine ordered. UA ordered. CMP ordered. Labs ordered.     1201 Attempted to reduce hernia without success. Plan is to order and review CT Abd Pelvis.     1203 Dilaudid ordered.     1238 Rechecked pt. Pt is resting comfortably, however reports the hernia \"feels bigger\" and is now burning.    1244 Reviewed pt's history and workup with Dr. Shi.  After a bedside evaluation, Dr. Shi agrees with the plan of care.    1318 Discussed pt w/Dr. Garcia (Radiology) regarding CT Abd Pelvis.     1318 Call made to Surgery    1321 Rechecked pt. Pt is resting comfortably with BP of 150/79 and HR of 84. Discussed w/pt results of CT Abd Pelvis is concerning of an incarcerated hernia. Plan is consult Surgery and admit pt, as he will need emergent surgery today. Pt reports last PO intake was 0900. Pt understands and agrees with the plan. All questions were answered.     1401 Discussed pt w/Dr. Sheikh (Surgery) who agrees to admit pt to the OR    1328 Based on the patient's lab findings and presenting symptoms, the doctor and I feel it is appropriate to admit the patient for " "further management, evaluation, and treatment.  I have discussed this with the admitting team.  I have also discussed this with the patient/family.  They are in agreement with admission.          Disposition vitals:  /73 (BP Location: Right arm, Patient Position: Lying)   Pulse 71   Temp 98.7 °F (37.1 °C) (Oral)   Resp 17   Ht 180.3 cm (71\")   Wt 93 kg (205 lb)   SpO2 97%   BMI 28.59 kg/m²       DIAGNOSIS  Final diagnoses:   Incarcerated left inguinal hernia     Documentation assistance provided by clarissa Cedeño for STEVE Ramos.  Information recorded by the clarissa was done at my direction and has been verified and validated by me.             Davidson Eduardo  09/15/19 1431       Sridevi Villegas APRN  09/15/19 1547    "

## 2019-09-15 NOTE — OP NOTE
SURGERY  Operative Note :  KORI Zepeda  1945    Procedure Date: 09/15/19    Pre-op Diagnosis:  · Incarcerated left inguinal hernia  · Suspected right inguinal hernia  · Possible epigastric/ventral hernia  · Small bowel obstruction    Post-op Diagnosis:  · Incarcerated left inguinal hernia with small bowel obstruction  · Small right inguinal hernia    Procedure:   · Diagnostic laparoscopy with reduction of incarcerated bowel, verification of viability, and resection of infarcted epiploica  · Laparoscopic left inguinal hernia repair, with mesh, extraperitoneal    Surgeon: Kori    Assistant: DAVIAN Hare    Indications:  · Nice 74-year-old who does not care to come to the doctor, whose had a hernia since 1995, with worsening symptoms for the last 3 months, really escalating for the last 2 weeks, particularly bad yesterday, and presenting to the ER today on Sunday.  He is incarcerated and has a small bowel obstruction with an impressive CT with sausage type rolls of small bowel across the abdomen.  It is not reducible    Associated Issues:  · Pacer placed in May  · Longtime smoker, quit in May    Findings:   · Incarcerated bowel, extremely difficult to reduce, unable to do so with a laparoscopic atraumatic's but finally able to do so with intense pressure externally, to reveal an infarcted epiploica from the colon, the small bowel bruised but viable, with the small bowel mesentery being torsed, and some evidence of clotting within the small arcades, but with the bowel quickly pinking up  · Extremely large hernia, chronic, with suspected injury to the vessels during repair, which may result in left testicular ischemia.  · Sac extremely large, and ultimately closed with a Endo Close loop    Recommendations:   · Overnight stay to ensure that he can tolerate liquids.    Specimens:   · None    EBL: Less than 50 cc    Technique:     General anesthetic was induced, abdomen prepped with ChloraPrep and  draped sterilely.  Small incision was made above the umbilicus at the site where I thought perhaps the ventral hernia was, as I was going to go through that site with the trocar and then repair the hernia.  I could not sense where the defect was with the hemostat and thus just placed the trocar there.  I then quickly visualize the bowel which was incarcerated.  A second trocar was placed in the right upper quadrant being a 5 mm and atraumatic was used to try to pull back the bowel which was completely useless.    I pressed with force from the right side and got part of it to reduce.  I was unable to reduce the rest of it and ultimately had to go over to the left side and pushed with great force upwards and finally the bowel reduced.  It tumbled into the abdomen and some bloody fluid accompanied it as well as the infarcted epiploica.  I looked at the small bowel and it is that I thought it probably would be okay and we would come back and look at a later as well as the epiploica.    I then sent out about the left inguinal hernia repair.  I wanted to do this in an extraperitoneal approach.  I placed a balloon in the extraperitoneal space by opening the anterior rectus fascia and going behind the rectus muscle on the left.  This was insufflated under direct vision.  It was then exchanged for a balloon trocar, and CO2 insufflated.  2 additional trochars were placed right in the midline, as there was no dissection over to the right side.  I believe the patient may have had surgery on the right side at some point previously.    Dissection was carried out along the cord and the lipoma the cord was noted and pulled back.  In so doing there was some of the vasculature of the testicle probably that was injured.  The chronicity of the sac adjacent to that was remarkable.  I finally was able to pull that sac back and even though it was a direct sac it really encircled around the testicular cord and led to the difficulty with  the injury.  The sac was pulled back sequentially and finally I was able to get it all the way out.  There was a large defect in it.  Ultimately used an Endo Close loop Vicryl in order to come around the base of the sac.  I did not try to make this completely tight but just approximated so that the remainder of the sac would be viable.  There was good closure however.    I then dissected out the pubic tubercle and the iliopubic tract and the lateral aspect of the pelvis.  We had great visualization.  A large 3D contour mesh was placed to the balloon trocar and oriented and secured with the absorb attack along the pubic tubercle and the pubic bone and then out laterally.  It laid very nicely.    I then went back into the abdominal cavity and examined the small bowel again and it appeared to be in good shape.  I lifted up the epiploica, came across it with a Humalog and divided that with the cautery.  I ended up having to open the 10 mm site where the camera goes all the way through the posterior rectus fascia in order to get this out, it being too large to come out through any of the 5 mm sites.    The 10 mm trocar site was closed at both the posterior and anterior rectus with figure-of-eight 0 Vicryls.  The dermis was closed there with 3-0 Vicryl and the skin at all the 5 mm sites with 5-0 Vicryl.  Dermabond.    Sonya Sheikh MD  09/15/19  6:10 PM  Sunday

## 2019-09-15 NOTE — ANESTHESIA POSTPROCEDURE EVALUATION
Patient: Orion Zepeda    Procedure Summary     Date:  09/15/19 Room / Location:  [facility] OR [address] [place] [address] MAIN OR    Anesthesia Start:  1600 Anesthesia Stop:  1755    Procedures:       INGUINAL HERNIA REPAIR LAPAROSCOPIC, REDUCTION OF SMALL BOWEL (N/A Abdomen)      DIAGNOSTIC LAPAROSCOPY (N/A Abdomen) Diagnosis:      Surgeon:  Sonya Sheikh MD Provider:  Arturo Olivares MD    Anesthesia Type:  general ASA Status:  4 - Emergent          Anesthesia Type: general  Last vitals  BP   149/92 (09/15/19 1845)   Temp   37.2 °C (98.9 °F) (09/15/19 1752)   Pulse   92 (09/15/19 1845)   Resp   16 (09/15/19 1845)     SpO2   98 % (09/15/19 1845)     Post Anesthesia Care and Evaluation    Patient location during evaluation: bedside  Patient participation: complete - patient participated  Level of consciousness: awake  Pain score: 1  Pain management: adequate  Airway patency: patent  Anesthetic complications: No anesthetic complications  PONV Status: controlled  Cardiovascular status: acceptable  Respiratory status: acceptable  Hydration status: acceptable    Comments: --------------------            09/15/19               1845     --------------------   BP:       149/92     Pulse:      92       Resp:       16       Temp:                SpO2:      98%      --------------------

## 2019-09-15 NOTE — H&P
SURGERY  Date of Visit: 09/15/19  Date of Admission:9/15/2019 11:34 AM     Reason for Consult/Admission: Incarcerated inguinal hernia    Patient's Chief Complaint: Left groin pain, abdominal pain    HPI    Mr. Zepeda is a nice 74 y.o. male who essentially does not like to see doctors, who has had a left inguinal hernia since 1995, which he has not found problematic.  It periodically pops out and he does pushes it back in.  Over the last 3 months it has become more problematic, when it comes out resulting in pretty significant abdominal pain that will last for an hour or 2 and then will go away.  Over the last 2 weeks he has had worsening problems with pain is been so bad that he had to go to bed, was intense for as long as 6 hours, and then began to hurt on the right side as well.  Last night was particularly bad, but he was able to lay down, get some sleep and then this morning the hernia was back in.  Upon getting up, the hernia was back out and he discussed this with his daughter-in-law who is a nurse in [place] and she recommended he come to the emergency room.    Now he says the pain is still present, the hernia is incarcerated, and he is having abdominal pain.  CT scan shows a left-sided inguinal hernia with dilated loops of small bowel leading to this location and edema within the hernia sac, compatible with incarcerated inguinal hernia.  A segment of small bowel within the hernia and demonstrates a thickened wall with torsion of the mesentery.  The colon appears normal.  I reviewed the CT and it is remarkable for for the sausage type appearance of the small bowel with dilatation, a large left inguinal hernia with contained small bowel a small epigastric hernia and a right inguinal hernia as well that does not have any contained bowel.  The left inguinal hernia appears to have omentum within it as well.  This is a clearly of small bowel obstruction as a result of the hernia.    He did come to the hospital  in May when he had to have a pacer placed, being in complete heart block.  Otherwise he stays away from doctors.  Dr. Bryn Moyer has been adjusting the rate.    Past Medical History:   Diagnosis Date   • Complete heart block (CMS/HCC)    • Dislocated shoulder, right, subsequent encounter 2011    no problems since then   • Syncope 2018    ER visit with no follow up     Past Surgical History:   Procedure Laterality Date   • CARDIAC ELECTROPHYSIOLOGY PROCEDURE N/A 5/5/2019    Procedure: Temporary Pacemaker;  Surgeon: Vonnie Jeffries MD;  Location:  [facility];  Service: Cardiology   • CARDIAC ELECTROPHYSIOLOGY PROCEDURE N/A 5/6/2019    Procedure: Pacemaker DC new  [place];  Surgeon: Bryn Gordon MD;  Location:  [facility];  Service: Cardiovascular   • CARDIAC ELECTROPHYSIOLOGY PROCEDURE N/A 5/5/2019    Procedure: Temporary Pacemaker- Reposition;  Surgeon: Vonnie Jeffries MD;  Location:  [facility];  Service: Cardiology   • PACEMAKER IMPLANTATION     • SHOULDER SURGERY       Family History   Problem Relation Age of Onset   • Stroke Mother    • Cancer Father      Social History     Socioeconomic History   • Marital status: Single     Spouse name: Not on file   • Number of children: Not on file   • Years of education: Not on file   • Highest education level: Not on file   Tobacco Use   • Smoking status: Former Smoker     Packs/day: 0.50     Years: 15.00     Pack years: 7.50   • Smokeless tobacco: Never Used   • Tobacco comment: quit in fall 2018   Substance and Sexual Activity   • Alcohol use: No   • Drug use: Yes     Types: Marijuana     Comment: every other evening   • Sexual activity: Defer         Current Facility-Administered Medications:   •  ceFAZolin in dextrose (ANCEF) IVPB solution 2 g, 2 g, Intravenous, Once, Sonya Sheikh MD  •  fentaNYL citrate (PF) (SUBLIMAZE) injection 100 mcg, 100 mcg, Intravenous, Q15 Min FLYNNN, Arturo Olivares  "MD Odin  •  lactated ringers infusion, 9 mL/hr, Intravenous, Continuous, Arturo Olivares MD, Last Rate: 9 mL/hr at 09/15/19 1506, 9 mL/hr at 09/15/19 1506  •  lidocaine PF 1% (XYLOCAINE) injection 0.5 mL, 0.5 mL, Injection, Once PRN, Arturo Olivares MD  •  midazolam (VERSED) injection 1 mg, 1 mg, Intravenous, Q5 Min PRN, 1 mg at 09/15/19 1515 **OR** midazolam (VERSED) injection 2 mg, 2 mg, Intravenous, Q5 Min PRN, Arturo Olivares MD  •  [COMPLETED] Insert peripheral IV, , , Once **AND** [MAR Hold] sodium chloride 0.9 % flush 10 mL, 10 mL, Intravenous, PRN, Sridevi Villegas, STEVE  •  sodium chloride 0.9 % flush 3 mL, 3 mL, Intravenous, Q12H, Arturo Olivares MD  •  sodium chloride 0.9 % flush 3-10 mL, 3-10 mL, Intravenous, PRN, Arturo Olivares MD    No Known Allergies    Review of Systems  Nausea, loose bowel movements, abdominal pain, no fever  All other systems reviewed and negative.    Vitals:    09/15/19 1139 09/15/19 1154 09/15/19 1307 09/15/19 1440   BP: 145/100 126/86 150/79 143/94   BP Location:    Right arm   Patient Position:    Lying   Pulse:  99 85 75   Resp:  18 20 20   Temp:    98.7 °F (37.1 °C)   TempSrc:    Oral   SpO2:  99% 95% 97%   Weight: 93 kg (205 lb)      Height: 180.3 cm (71\")          PHYSICAL EXAM:    /94 (BP Location: Right arm, Patient Position: Lying)   Pulse 75   Temp 98.7 °F (37.1 °C) (Oral)   Resp 20   Ht 180.3 cm (71\")   Wt 93 kg (205 lb)   SpO2 97%   BMI 28.59 kg/m²   Body mass index is 28.59 kg/m².    Constitutional: well developed, well nourished, appears somewhat chronically in a nonideal health state, located on a stretcher  Eyes: sclerae nonicteric, conjunctivae injected   ENMT: Hearing intact, trachea midline, thyroid without masses, edentulous except for 1 to  CVS: RRR, no murmur, peripheral edema not present, paced  Respiratory: CTA, normal respiratory effort   Gastrointestinal: no hepatosplenomegaly, abdomen soft, tender in the left lower quadrant, " abdominal hernia not readily palpable  Genitourinary: inguinal hernia visible on the left with a large mass about 6 to 8 inches, not reducible, no palpable right inguinal hernia despite visualization on CT  Musculoskeletal: gait not witnessed, muscle mass normal  Skin: warm and dry, no rashes visible  Neurological: awake and alert, seems to have reasonable capacity for understanding for medical decision making  Psychiatric: appears to have reasonable judgement, pleasant  Lymphatics: no cervical adenopathy    Radiographic Findings: As noted    Lab Findings: White count is 9.92, hemoglobin 16.3 INR 1, bicarb 19.5, creatinine 1.16, bilirubin 2    IMPRESSION:  · Incarcerated left inguinal hernia with small bowel obstruction.  The small bowel within the hernia appears thickened but hopefully will not need to be resected and hopefully can be reduced laparoscopically.  · Avoidance of doctors in general  · Smoking history, just quit in May  · Pacer placed in May due to a complete heart block.  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  Patient will come straight from the ER to the operating room  · diagnostic laparoscopy with attempt at reducing the incarcerated bowel, and evaluation of the bowel to ensure that it is viable.  We will then proceed with repair of the hernia at that time.  Based on how well he is doing, may repair the right inguinal hernia as well, and the ventral hernia.    Sonya Sheikh MD  09/15/19  3:28 PM  Sunday

## 2019-09-16 VITALS
HEART RATE: 89 BPM | DIASTOLIC BLOOD PRESSURE: 72 MMHG | OXYGEN SATURATION: 95 % | SYSTOLIC BLOOD PRESSURE: 127 MMHG | HEIGHT: 71 IN | BODY MASS INDEX: 28.7 KG/M2 | TEMPERATURE: 97.6 F | WEIGHT: 205 LBS | RESPIRATION RATE: 18 BRPM

## 2019-09-16 LAB
ANION GAP SERPL CALCULATED.3IONS-SCNC: 12.9 MMOL/L (ref 5–15)
BASOPHILS # BLD AUTO: 0.05 10*3/MM3 (ref 0–0.2)
BASOPHILS NFR BLD AUTO: 0.4 % (ref 0–1.5)
BUN BLD-MCNC: 11 MG/DL (ref 8–23)
BUN/CREAT SERPL: 10.2 (ref 7–25)
CALCIUM SPEC-SCNC: 9.1 MG/DL (ref 8.6–10.5)
CHLORIDE SERPL-SCNC: 106 MMOL/L (ref 98–107)
CO2 SERPL-SCNC: 24.1 MMOL/L (ref 22–29)
CREAT BLD-MCNC: 1.08 MG/DL (ref 0.76–1.27)
DEPRECATED RDW RBC AUTO: 44.1 FL (ref 37–54)
EOSINOPHIL # BLD AUTO: 0.02 10*3/MM3 (ref 0–0.4)
EOSINOPHIL NFR BLD AUTO: 0.2 % (ref 0.3–6.2)
ERYTHROCYTE [DISTWIDTH] IN BLOOD BY AUTOMATED COUNT: 12.4 % (ref 12.3–15.4)
GFR SERPL CREATININE-BSD FRML MDRD: 67 ML/MIN/1.73
GLUCOSE BLD-MCNC: 105 MG/DL (ref 65–99)
HCT VFR BLD AUTO: 42.6 % (ref 37.5–51)
HGB BLD-MCNC: 14.3 G/DL (ref 13–17.7)
IMM GRANULOCYTES # BLD AUTO: 0.04 10*3/MM3 (ref 0–0.05)
IMM GRANULOCYTES NFR BLD AUTO: 0.3 % (ref 0–0.5)
LYMPHOCYTES # BLD AUTO: 1.74 10*3/MM3 (ref 0.7–3.1)
LYMPHOCYTES NFR BLD AUTO: 13.2 % (ref 19.6–45.3)
MCH RBC QN AUTO: 32.3 PG (ref 26.6–33)
MCHC RBC AUTO-ENTMCNC: 33.6 G/DL (ref 31.5–35.7)
MCV RBC AUTO: 96.2 FL (ref 79–97)
MONOCYTES # BLD AUTO: 0.99 10*3/MM3 (ref 0.1–0.9)
MONOCYTES NFR BLD AUTO: 7.5 % (ref 5–12)
NEUTROPHILS # BLD AUTO: 10.31 10*3/MM3 (ref 1.7–7)
NEUTROPHILS NFR BLD AUTO: 78.4 % (ref 42.7–76)
NRBC BLD AUTO-RTO: 0 /100 WBC (ref 0–0.2)
PLATELET # BLD AUTO: 320 10*3/MM3 (ref 140–450)
PMV BLD AUTO: 9.4 FL (ref 6–12)
POTASSIUM BLD-SCNC: 4.3 MMOL/L (ref 3.5–5.2)
RBC # BLD AUTO: 4.43 10*6/MM3 (ref 4.14–5.8)
SODIUM BLD-SCNC: 143 MMOL/L (ref 136–145)
WBC NRBC COR # BLD: 13.15 10*3/MM3 (ref 3.4–10.8)

## 2019-09-16 PROCEDURE — 80048 BASIC METABOLIC PNL TOTAL CA: CPT | Performed by: SURGERY

## 2019-09-16 PROCEDURE — 85025 COMPLETE CBC W/AUTO DIFF WBC: CPT | Performed by: SURGERY

## 2019-09-16 RX ORDER — HYDROCODONE BITARTRATE AND ACETAMINOPHEN 5; 325 MG/1; MG/1
1 TABLET ORAL EVERY 4 HOURS PRN
Qty: 10 TABLET | Refills: 0 | Status: SHIPPED | OUTPATIENT
Start: 2019-09-16 | End: 2019-09-25

## 2019-09-16 RX ORDER — ONDANSETRON 4 MG/1
4 TABLET, FILM COATED ORAL EVERY 8 HOURS PRN
Qty: 20 TABLET | Refills: 0 | Status: SHIPPED | OUTPATIENT
Start: 2019-09-16 | End: 2019-09-25

## 2019-09-16 RX ADMIN — SODIUM CHLORIDE, POTASSIUM CHLORIDE, SODIUM LACTATE AND CALCIUM CHLORIDE 100 ML/HR: 600; 310; 30; 20 INJECTION, SOLUTION INTRAVENOUS at 04:02

## 2019-09-16 NOTE — DISCHARGE SUMMARY
Discharge Summary    Patient name: Orion Zepeda    Medical record number: [id number]    Admission date: 9/15/2019  Discharge date: 9/16/2019    Attending physician: Dr. 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 male that was admitted to the hospital on 9/15/2019 with a small bowel obstruction and a large inguinal hernia that has been present since 1995, with exacerbation of symptoms over the last 3 months, significant pain over the last 2 weeks, finally came to the ER Sunday afternoon with CT scan showing the hernia of the incarceration, and the small bowel obstruction.  He was unable to be reduced.    He was taken to the OR emergently, straight from the emergency room, where laparoscopy was carried out and under force, the bowel reduced to reveal some clotting in the mesentery in the small arcades, but a still viable small bowel.  The hernia was then repaired.  The bowel was reevaluated after completion of the hernia repair and duties to be completely viable.  I opted to keep him in overnight to ensure that he could tolerate diet.  He is done extremely well postoperatively taking nothing for pain, tolerating a regular diet and walking around.    He will be discharged and follow-up in the office with encouragement to seek medical care in general.    Pathology:   · None     Discharge medications:       Your medication list      START taking these medications      Instructions Last Dose Given Next Dose Due   HYDROcodone-acetaminophen 5-325 MG per tablet  Commonly known as:  NORCO      Take 1 tablet by mouth Every 4 (Four) Hours As Needed for Moderate Pain  for up to 16 doses.       ondansetron 4 MG tablet  Commonly known as:  ZOFRAN      Take 1 tablet by mouth Every 8 (Eight) Hours As Needed for Nausea or Vomiting for up to 20 doses.             Where to Get Your Medications      These medications were sent to [id number] - [place], [place] - [address] [facility] AT [facility] - [phone] [facility] [phone] [facility] [facility], [facility] [address]    Phone:  [phone]   · HYDROcodone-acetaminophen 5-325 MG per tablet  · ondansetron 4 MG tablet         Discharge diet:  · Regular    Recommendations:    · Follow up in the office  · Will encourage patient to seek regular medical care, including evaluation for medications which have been mentioned in best practices  · Will query him as to his acceptance of possible screening colonoscopy      Sonya Sheikh MD  Office Number: [phone].

## 2019-09-16 NOTE — PLAN OF CARE
Problem: Patient Care Overview  Goal: Plan of Care Review  Outcome: Ongoing (interventions implemented as appropriate)   09/16/19 7430   Coping/Psychosocial   Plan of Care Reviewed With patient   Plan of Care Review   Progress improving   OTHER   Outcome Summary to floor from PACU at 2000. 5 lap sites to abd with dermabond. Dry, no drainage. VSS. SCD in place. Uses urinal. LR at 100. 02 placed for overnight with cont. pulse ox in place. 02 sats dropped to 90 with sleeping. Full liq diet, may advance as tolerated.       Problem: Fall Risk (Adult)  Goal: Identify Related Risk Factors and Signs and Symptoms  Outcome: Ongoing (interventions implemented as appropriate)      Problem: Surgery Nonspecified (Adult)  Goal: Signs and Symptoms of Listed Potential Problems Will be Absent, Minimized or Managed (Surgery Nonspecified)  Outcome: Ongoing (interventions implemented as appropriate)

## 2019-09-16 NOTE — PROGRESS NOTES
Case Management Discharge Note    Final Note: Home; no needs. Tessa Banegaskert CSW     Destination      No service has been selected for the patient.      Durable Medical Equipment      No service has been selected for the patient.      Dialysis/Infusion      No service has been selected for the patient.      Home Medical Care      No service has been selected for the patient.      Therapy      No service has been selected for the patient.      Community Resources      No service has been selected for the patient.             Final Discharge Disposition Code: 01 - home or self-care

## 2019-09-25 ENCOUNTER — OFFICE VISIT (OUTPATIENT)
Dept: SURGERY | Facility: CLINIC | Age: 74
End: 2019-09-25

## 2019-09-25 VITALS — WEIGHT: 216.8 LBS | OXYGEN SATURATION: 99 % | BODY MASS INDEX: 30.24 KG/M2 | HEART RATE: 92 BPM

## 2019-09-25 DIAGNOSIS — K40.30 INCARCERATED LEFT INGUINAL HERNIA: Primary | ICD-10-CM

## 2019-09-25 PROCEDURE — 99024 POSTOP FOLLOW-UP VISIT: CPT | Performed by: SURGERY

## 2019-09-25 NOTE — PROGRESS NOTES
SURGERY: JOSIE  Post op Visit  Orion Zepeda  09/25/19    Mr. Zepeda  presents today after having undergone Surgery 9/15/2019, of a diagnostic laparoscopy with reduction of incarcerated bowel, verification of viability and resection of infarcted epiploica as well as a laparoscopic left inguinal hernia repair, with mesh, extraperitoneal.  This was for an incarcerated left inguinal hernia, with a small bowel obstruction.  He had had this hernia for over 20 years, with escalating symptoms for about a month, but opted to come in on a Sunday.  His CT scan showed sausage type rolls of small bowel across the abdomen and it was not reducible.    In the OR I finally was able to reduce it but it required a dramatic amount of pressure.  His sac was huge.  The bowel remained viable and the sac was pulled back and it was so large that I finally put an Endoloop around it.  Of course the mesh was placed.    He did extremely well in the hospital and was discharged quickly.  He has had no pain at the hernia site since then but does have a lot of fluid in his cord.  I do not think is a recurrence but I have asked him to come back in 4 months so I can reexamine it.  It is completely gone he can cancel that visit.      Sonya Sheikh MD  2:08 PM

## 2019-11-19 ENCOUNTER — CLINICAL SUPPORT NO REQUIREMENTS (OUTPATIENT)
Dept: CARDIOLOGY | Facility: CLINIC | Age: 74
End: 2019-11-19

## 2019-11-19 DIAGNOSIS — I44.2 COMPLETE HEART BLOCK (HCC): Primary | ICD-10-CM

## 2019-11-19 PROCEDURE — 93296 REM INTERROG EVL PM/IDS: CPT | Performed by: INTERNAL MEDICINE

## 2019-11-19 PROCEDURE — 93294 REM INTERROG EVL PM/LDLS PM: CPT | Performed by: INTERNAL MEDICINE

## 2019-11-23 ENCOUNTER — CLINICAL SUPPORT NO REQUIREMENTS (OUTPATIENT)
Dept: CARDIOLOGY | Facility: CLINIC | Age: 74
End: 2019-11-23

## 2019-11-23 DIAGNOSIS — I44.2 THIRD DEGREE AV BLOCK (HCC): Primary | ICD-10-CM

## 2019-11-25 ENCOUNTER — TELEPHONE (OUTPATIENT)
Dept: CARDIOLOGY | Facility: CLINIC | Age: 74
End: 2019-11-25

## 2020-01-07 ENCOUNTER — OFFICE VISIT (OUTPATIENT)
Dept: CARDIOLOGY | Facility: CLINIC | Age: 75
End: 2020-01-07

## 2020-01-07 ENCOUNTER — CLINICAL SUPPORT NO REQUIREMENTS (OUTPATIENT)
Dept: CARDIOLOGY | Facility: CLINIC | Age: 75
End: 2020-01-07

## 2020-01-07 VITALS
WEIGHT: 234 LBS | DIASTOLIC BLOOD PRESSURE: 86 MMHG | BODY MASS INDEX: 32.76 KG/M2 | HEART RATE: 90 BPM | HEIGHT: 71 IN | SYSTOLIC BLOOD PRESSURE: 128 MMHG

## 2020-01-07 DIAGNOSIS — I44.2 THIRD DEGREE AV BLOCK (HCC): Primary | ICD-10-CM

## 2020-01-07 DIAGNOSIS — I44.2 COMPLETE HEART BLOCK (HCC): Primary | ICD-10-CM

## 2020-01-07 DIAGNOSIS — I44.2 THIRD DEGREE HEART BLOCK (HCC): ICD-10-CM

## 2020-01-07 PROCEDURE — 99024 POSTOP FOLLOW-UP VISIT: CPT | Performed by: INTERNAL MEDICINE

## 2020-01-07 PROCEDURE — 93000 ELECTROCARDIOGRAM COMPLETE: CPT | Performed by: INTERNAL MEDICINE

## 2020-01-07 PROCEDURE — 93280 PM DEVICE PROGR EVAL DUAL: CPT | Performed by: INTERNAL MEDICINE

## 2020-01-13 ENCOUNTER — OFFICE VISIT (OUTPATIENT)
Dept: SURGERY | Facility: CLINIC | Age: 75
End: 2020-01-13

## 2020-01-13 VITALS — BODY MASS INDEX: 32.76 KG/M2 | OXYGEN SATURATION: 97 % | WEIGHT: 234 LBS | HEIGHT: 71 IN | HEART RATE: 115 BPM

## 2020-01-13 DIAGNOSIS — R19.09 GROIN MASS: Primary | ICD-10-CM

## 2020-01-13 PROCEDURE — 99213 OFFICE O/P EST LOW 20 MIN: CPT | Performed by: SURGERY

## 2020-01-13 NOTE — PROGRESS NOTES
SURGERY  Orion Zepeda   1945 01/13/20    Chief Complaint:  Left groin mass    HPI    Mr. Zepeda is a nice 74 y.o. male who returns after having undergone Surgery 9/15/2019, of a diagnostic laparoscopy with reduction of incarcerated bowel, verification of viability and resection of infarcted epiploica as well as a laparoscopic left inguinal hernia repair, with mesh, extraperitoneal.  This was for an incarcerated left inguinal hernia, with a small bowel obstruction.  He had this hernia for over 20 years, with escalating symptoms for about a month, but opted to come in on a Sunday.  His CT scan showed sausage type rolls of small bowel across the abdomen and it was not reducible.     In the OR I finally was able to reduce it but it required a dramatic amount of pressure.  His sac was huge.  The bowel remained viable and the sac was pulled back and it was so large that I finally put an Endoloop around it.  Of course the mesh was placed.     He did extremely well in the hospital and was discharged quickly.  He has had no pain at the hernia site since then but does have a lot of fluid in his cord.  I did not think it was a recurrence but I had asked him to come back in 4 months so I could reexamine it.      Today, he says it is smaller and comes and goes but has no pain whatsoever.  It is nothing like his prior symptoms.      Past Medical History:   Diagnosis Date   • Complete heart block (CMS/HCC)    • Dislocated shoulder, right, subsequent encounter 2011    no problems since then   • Syncope 2018    ER visit with no follow up     Past Surgical History:   Procedure Laterality Date   • CARDIAC ELECTROPHYSIOLOGY PROCEDURE N/A 5/5/2019    Procedure: Temporary Pacemaker;  Surgeon: Vonnie Jeffries MD;  Location: [facility] CATH INVASIVE LOCATION;  Service: Cardiology   • CARDIAC ELECTROPHYSIOLOGY PROCEDURE N/A 5/6/2019    Procedure: Pacemaker DC new  [place];  Surgeon: Bryn Gordon MD;  Location: [facility]  "CATH INVASIVE LOCATION;  Service: Cardiovascular   • CARDIAC ELECTROPHYSIOLOGY PROCEDURE N/A 5/5/2019    Procedure: Temporary Pacemaker- Reposition;  Surgeon: Vonnie Jeffries MD;  Location: [facility] CATH INVASIVE LOCATION;  Service: Cardiology   • DIAGNOSTIC LAPAROSCOPY N/A 9/15/2019    Procedure: DIAGNOSTIC LAPAROSCOPY;  Surgeon: Sonya Sheikh MD;  Location: [facility] OR;  Service: General   • INGUINAL HERNIA REPAIR N/A 9/15/2019    Procedure: INGUINAL HERNIA REPAIR LAPAROSCOPIC, REDUCTION OF SMALL BOWEL;  Surgeon: Sonya Sheikh MD;  Location: [facility] OR;  Service: General   • PACEMAKER IMPLANTATION     • SHOULDER SURGERY       Family History   Problem Relation Age of Onset   • Stroke Mother    • Cancer Father      Social History     Socioeconomic History   • Marital status: Single     Spouse name: Not on file   • Number of children: Not on file   • Years of education: Not on file   • Highest education level: Not on file   Tobacco Use   • Smoking status: Former Smoker     Packs/day: 0.50     Years: 15.00     Pack years: 7.50   • Smokeless tobacco: Never Used   • Tobacco comment: quit in fall 2018   Substance and Sexual Activity   • Alcohol use: No   • Drug use: Yes     Types: Marijuana     Comment: every other evening   • Sexual activity: Defer       No current outpatient medications on file.    No Known Allergies  Review of Systems  Negative for chest pain or shortness of breath  All other systems reviewed and negative.    Vitals:    01/13/20 1530   Pulse: 115   SpO2: 97%   Weight: 106 kg (234 lb)   Height: 180.3 cm (71\")       PHYSICAL EXAM:    Pulse 115   Ht 180.3 cm (71\")   Wt 106 kg (234 lb)   SpO2 97%   BMI 32.64 kg/m²   Body mass index is 32.64 kg/m².    Constitutional: well developed, well nourished, appears  healthy, stated age  Eyes: sclera nonicteric, conjunctiva not injected   ENMT: Hearing intact, trachea midline, dentitia in good order  CVS: RRR, no murmur, peripheral edema not " present  Respiratory: CTA, normal respiratory effort   Gastrointestinal: no hepatosplenomegaly, abdomen soft, abdominal hernia not present  Genitourinary: inguinal hernia not thought to be present, but he does have a large mass in the left groin, it feels like a fluid-filled capsule, approximately 15 cm in length, 4 to 5 cm in diameter, that again does not feel like a hernia, with no pulsation at the inguinal canal, but quite large.  Musculoskeletal: gait normal, muscle mass normal  Skin: warm and dry, no rashes visible  Neurological: awake and alert, seems to have reasonable capacity for understanding for medical decision making  Psychiatric: appears to have reasonable judgement, pleasant  Lymphatics: no cervical adenopathy    IMPRESSION:  · Right groin mass.  I really think this is probably a seroma but I feel compelled to confirm that.  If it is he is having no symptoms and I think we should leave it alone.  However if it is a recurrence, then we need to address    PLAN:  · CT of the pelvis.  Discussed with him the options based on the CT findings, and he understands if there is a recurrent hernia that we need to plan to operate on that.    Sonya Sheikh MD  01/13/20  6:48 PM

## 2020-01-28 ENCOUNTER — HOSPITAL ENCOUNTER (OUTPATIENT)
Dept: CT IMAGING | Facility: HOSPITAL | Age: 75
Discharge: HOME | End: 2020-01-28
Admitting: SURGERY

## 2020-01-28 DIAGNOSIS — R19.09 GROIN MASS: ICD-10-CM

## 2020-01-28 LAB — CREAT BLDA-MCNC: 1.1 MG/DL (ref 0.6–1.3)

## 2020-01-28 PROCEDURE — 82565 ASSAY OF CREATININE: CPT

## 2020-01-28 PROCEDURE — 25010000002 IOPAMIDOL 61 % SOLUTION: Performed by: SURGERY

## 2020-01-28 PROCEDURE — 72193 CT PELVIS W/DYE: CPT

## 2020-01-28 RX ADMIN — IOPAMIDOL 85 ML: 612 INJECTION, SOLUTION INTRAVENOUS at 08:40

## 2020-02-03 ENCOUNTER — TELEPHONE (OUTPATIENT)
Dept: SURGERY | Facility: CLINIC | Age: 75
End: 2020-02-03

## 2020-02-03 NOTE — TELEPHONE ENCOUNTER
----- Message from Sonya Sheikh MD sent at 2/3/2020  7:57 AM EST -----  Becky (endoscopy liaison),    Please call patient to inform them of these findings and recommendations and ensure that any pamphlets that were to be given to the patient at the hospital were received.     Please make sure a letter is sent to the patient, recall method entered into the computer and the HIM tab updated as far as need for endoscopy follow up.    Thanks  Dr Sheikh    I tried to call him over the weekend with no answer, no machine, to tell him that he doesn't have a recurrent hernia, but does have a lot of fluid in the cord.  I can try to aspirate that in the office if he would like.

## 2020-02-03 NOTE — PROGRESS NOTES
Becky (endoscopy liaison),    Please call patient to inform them of these findings and recommendations and ensure that any pamphlets that were to be given to the patient at the hospital were received.     Please make sure a letter is sent to the patient, recall method entered into the computer and the HIM tab updated as far as need for endoscopy follow up.    Thanks  Dr Sheikh    I tried to call him over the weekend with no answer, no machine, to tell him that he doesn't have a recurrent hernia, but does have a lot of fluid in the cord.  I can try to aspirate that in the office if he would like.

## 2020-04-14 ENCOUNTER — TELEPHONE (OUTPATIENT)
Dept: CARDIOLOGY | Facility: CLINIC | Age: 75
End: 2020-04-14

## 2020-04-14 ENCOUNTER — CLINICAL SUPPORT NO REQUIREMENTS (OUTPATIENT)
Dept: CARDIOLOGY | Facility: CLINIC | Age: 75
End: 2020-04-14

## 2020-04-14 DIAGNOSIS — I44.2 THIRD DEGREE AV BLOCK (HCC): Primary | ICD-10-CM

## 2020-04-14 PROCEDURE — 93296 REM INTERROG EVL PM/IDS: CPT | Performed by: INTERNAL MEDICINE

## 2020-04-14 PROCEDURE — 93294 REM INTERROG EVL PM/LDLS PM: CPT | Performed by: INTERNAL MEDICINE

## 2020-04-14 NOTE — TELEPHONE ENCOUNTER
Found on routine remote pacemaker check today. 1 NST recorded from 2/5 1315. On review this is 12 beats VT@ 195bpm. No other events recorded. Attempted to call pt results and inquire about symptoms for VT episode. No answer, requested call back.   NS

## 2021-01-07 ENCOUNTER — OFFICE VISIT (OUTPATIENT)
Dept: CARDIOLOGY | Facility: CLINIC | Age: 76
End: 2021-01-07

## 2021-01-07 ENCOUNTER — CLINICAL SUPPORT NO REQUIREMENTS (OUTPATIENT)
Dept: CARDIOLOGY | Facility: CLINIC | Age: 76
End: 2021-01-07

## 2021-01-07 VITALS
WEIGHT: 230 LBS | HEART RATE: 97 BPM | BODY MASS INDEX: 32.2 KG/M2 | HEIGHT: 71 IN | DIASTOLIC BLOOD PRESSURE: 80 MMHG | SYSTOLIC BLOOD PRESSURE: 114 MMHG

## 2021-01-07 DIAGNOSIS — I44.2 THIRD DEGREE HEART BLOCK (HCC): Primary | ICD-10-CM

## 2021-01-07 DIAGNOSIS — I44.2 THIRD DEGREE HEART BLOCK (HCC): ICD-10-CM

## 2021-01-07 DIAGNOSIS — R55 SYNCOPE, UNSPECIFIED SYNCOPE TYPE: Primary | ICD-10-CM

## 2021-01-07 PROCEDURE — 93280 PM DEVICE PROGR EVAL DUAL: CPT | Performed by: INTERNAL MEDICINE

## 2021-01-07 PROCEDURE — 99212 OFFICE O/P EST SF 10 MIN: CPT | Performed by: INTERNAL MEDICINE

## 2021-01-07 PROCEDURE — 93000 ELECTROCARDIOGRAM COMPLETE: CPT | Performed by: INTERNAL MEDICINE

## 2021-01-07 NOTE — PROGRESS NOTES
Date of Office Visit: 2021  Encounter Provider: Bryn Gordon MD  Place of Service: [facility] CARDIOLOGY  Patient Name: Orion Zepeda  :1945    Chief Complaint   Patient presents with   • Pacemaker Check     1 year follow up   :     HPI: Orion Zepeda is a 75 y.o. male who presents today for complete heart block s/p pacemaker which was implanted in 2019.     He has not had any syncope.  He has been doing well.  No concerns about the device.      He asked about receiving the Covid-19 vaccine.           Past Medical History:   Diagnosis Date   • Complete heart block (CMS/HCC)    • Dislocated shoulder, right, subsequent encounter     no problems since then   • Syncope     ER visit with no follow up       Past Surgical History:   Procedure Laterality Date   • CARDIAC ELECTROPHYSIOLOGY PROCEDURE N/A 2019    Procedure: Temporary Pacemaker;  Surgeon: Vonnie Jeffries MD;  Location: [facility] INVASIVE LOCATION;  Service: Cardiology   • CARDIAC ELECTROPHYSIOLOGY PROCEDURE N/A 2019    Procedure: Pacemaker DC new  [place];  Surgeon: Bryn Gordon MD;  Location: [facility] INVASIVE LOCATION;  Service: Cardiovascular   • CARDIAC ELECTROPHYSIOLOGY PROCEDURE N/A 2019    Procedure: Temporary Pacemaker- Reposition;  Surgeon: Vonnie Jeffries MD;  Location: [facility] INVASIVE LOCATION;  Service: Cardiology   • DIAGNOSTIC LAPAROSCOPY N/A 9/15/2019    Procedure: DIAGNOSTIC LAPAROSCOPY;  Surgeon: Sonya Sheikh MD;  Location: [facility] OR;  Service: General   • INGUINAL HERNIA REPAIR N/A 9/15/2019    Procedure: INGUINAL HERNIA REPAIR LAPAROSCOPIC, REDUCTION OF SMALL BOWEL;  Surgeon: Sonya Sheikh MD;  Location: [facility] OR;  Service: General   • PACEMAKER IMPLANTATION     • SHOULDER SURGERY         Social History     Socioeconomic History   • Marital status: Single     Spouse name: Not on file   • Number of children: Not on file   •  "Years of education: Not on file   • Highest education level: Not on file   Tobacco Use   • Smoking status: Former Smoker     Packs/day: 0.50     Years: 15.00     Pack years: 7.50   • Smokeless tobacco: Never Used   • Tobacco comment: quit in fall 2018   Substance and Sexual Activity   • Alcohol use: No   • Drug use: Yes     Types: Marijuana     Comment: every other evening   • Sexual activity: Defer       Family History   Problem Relation Age of Onset   • Stroke Mother    • Cancer Father        Review of Systems   Constitution: Negative.   Cardiovascular: Negative.    Respiratory: Negative.    Gastrointestinal: Negative.        No Known Allergies    No current outpatient medications on file.      Objective:     Vitals:    01/07/21 1011   BP: 114/80   Pulse: 97   Weight: 104 kg (230 lb)   Height: 180.3 cm (71\")     Body mass index is 32.08 kg/m².    PHYSICAL EXAM:    Vitals signs and nursing note reviewed.   Constitutional:       Appearance: Healthy appearance.   Cardiovascular:      Normal rate. Regular rhythm.      Murmurs: There is no murmur.   Neurological:      General: No focal deficit present.             ECG 12 Lead    Date/Time: 1/7/2021 1:45 PM  Performed by: Bryn Gordon MD  Authorized by: Bryn Gordon MD   Comparison: compared with previous ECG from 1/7/2020  Similar to previous ECG  Rhythm: sinus rhythm  Conduction: right bundle branch block, left anterior fascicular block and 1st degree AV block              Assessment:       Diagnosis Plan   1. Syncope, unspecified syncope type     2. Third degree heart block (CMS/HCC)            Plan:       1. Continue remote monitoring.  Follow-up in one year.     As always, it has been a pleasure to participate in your patient's care.      Sincerely,         Bryn Gordon MD  "

## 2021-03-02 DIAGNOSIS — Z23 IMMUNIZATION DUE: ICD-10-CM

## 2021-08-02 PROCEDURE — 93294 REM INTERROG EVL PM/LDLS PM: CPT | Performed by: INTERNAL MEDICINE

## 2021-08-02 PROCEDURE — 93296 REM INTERROG EVL PM/IDS: CPT | Performed by: INTERNAL MEDICINE

## 2021-11-01 PROCEDURE — 93294 REM INTERROG EVL PM/LDLS PM: CPT | Performed by: INTERNAL MEDICINE

## 2021-11-01 PROCEDURE — 93296 REM INTERROG EVL PM/IDS: CPT | Performed by: INTERNAL MEDICINE

## 2022-01-31 PROCEDURE — 93294 REM INTERROG EVL PM/LDLS PM: CPT | Performed by: INTERNAL MEDICINE

## 2022-01-31 PROCEDURE — 93296 REM INTERROG EVL PM/IDS: CPT | Performed by: INTERNAL MEDICINE

## 2022-03-01 ENCOUNTER — CLINICAL SUPPORT NO REQUIREMENTS (OUTPATIENT)
Dept: CARDIOLOGY | Facility: CLINIC | Age: 77
End: 2022-03-01

## 2022-03-01 ENCOUNTER — OFFICE VISIT (OUTPATIENT)
Dept: CARDIOLOGY | Facility: CLINIC | Age: 77
End: 2022-03-01

## 2022-03-01 VITALS
HEIGHT: 71 IN | WEIGHT: 243 LBS | SYSTOLIC BLOOD PRESSURE: 120 MMHG | BODY MASS INDEX: 34.02 KG/M2 | DIASTOLIC BLOOD PRESSURE: 80 MMHG | HEART RATE: 103 BPM

## 2022-03-01 DIAGNOSIS — I44.2 THIRD DEGREE HEART BLOCK: ICD-10-CM

## 2022-03-01 DIAGNOSIS — I44.2 THIRD DEGREE HEART BLOCK: Primary | ICD-10-CM

## 2022-03-01 DIAGNOSIS — R06.02 SHORTNESS OF BREATH: ICD-10-CM

## 2022-03-01 DIAGNOSIS — I42.0 CARDIOMYOPATHY, DILATED: Primary | ICD-10-CM

## 2022-03-01 PROCEDURE — 93280 PM DEVICE PROGR EVAL DUAL: CPT | Performed by: INTERNAL MEDICINE

## 2022-03-01 PROCEDURE — 93000 ELECTROCARDIOGRAM COMPLETE: CPT | Performed by: INTERNAL MEDICINE

## 2022-03-01 PROCEDURE — 99214 OFFICE O/P EST MOD 30 MIN: CPT | Performed by: INTERNAL MEDICINE

## 2022-03-01 RX ORDER — GUAIFENESIN 600 MG/1
1200 TABLET, EXTENDED RELEASE ORAL 2 TIMES DAILY
COMMUNITY
End: 2022-06-20

## 2022-03-01 RX ORDER — FLUTICASONE PROPIONATE 50 MCG
2 SPRAY, SUSPENSION (ML) NASAL DAILY
COMMUNITY
End: 2022-06-21 | Stop reason: HOSPADM

## 2022-03-01 NOTE — PROGRESS NOTES
Date of Office Visit: 2022  Encounter Provider: Bryn Gordon MD  Place of Service: [facility] CARDIOLOGY  Patient Name: Orion Zepeda  :1945    Chief Complaint   Patient presents with   • third degree heart block     1 yr f/u   • Loss of Consciousness   :     HPI: Orion Zepeda is a 76 y.o. male who presents today for follow-up of complete heart block.    Patient reports that over the past year he has noticed increasing shortness of breath.  He is also had some associated weight gain.    He reports shortness of breath with relatively slight activity such as walking down his driveway.    He feels like this is secondary to a sinus infection.          Past Medical History:   Diagnosis Date   • Complete heart block (HCC)    • Dislocated shoulder, right, subsequent encounter     no problems since then   • Syncope 2018    ER visit with no follow up       Past Surgical History:   Procedure Laterality Date   • CARDIAC ELECTROPHYSIOLOGY PROCEDURE N/A 2019    Procedure: Temporary Pacemaker;  Surgeon: Vonnie Jeffries MD;  Location: [facility] INVASIVE LOCATION;  Service: Cardiology   • CARDIAC ELECTROPHYSIOLOGY PROCEDURE N/A 2019    Procedure: Pacemaker DC new  [place];  Surgeon: Bryn Gordon MD;  Location: [facility] CATH INVASIVE LOCATION;  Service: Cardiovascular   • CARDIAC ELECTROPHYSIOLOGY PROCEDURE N/A 2019    Procedure: Temporary Pacemaker- Reposition;  Surgeon: Vonnie Jeffries MD;  Location: [facility] CATH INVASIVE LOCATION;  Service: Cardiology   • DIAGNOSTIC LAPAROSCOPY N/A 9/15/2019    Procedure: DIAGNOSTIC LAPAROSCOPY;  Surgeon: Sonya Sheikh MD;  Location: [facility] OR;  Service: General   • INGUINAL HERNIA REPAIR N/A 9/15/2019    Procedure: INGUINAL HERNIA REPAIR LAPAROSCOPIC, REDUCTION OF SMALL BOWEL;  Surgeon: Sonya Sheikh MD;  Location: [facility] MAIN OR;  Service: General   • PACEMAKER IMPLANTATION     • SHOULDER SURGERY   "       Social History     Socioeconomic History   • Marital status: Single   Tobacco Use   • Smoking status: Former Smoker     Packs/day: 0.50     Years: 15.00     Pack years: 7.50   • Smokeless tobacco: Never Used   • Tobacco comment: quit in fall 2018   Vaping Use   • Vaping Use: Never used   Substance and Sexual Activity   • Alcohol use: No   • Drug use: Yes     Types: Marijuana     Comment: every other evening   • Sexual activity: Defer       Family History   Problem Relation Age of Onset   • Stroke Mother    • Cancer Father        Review of Systems   Constitutional: Negative.   Cardiovascular: Positive for dyspnea on exertion.   Respiratory: Negative.    Gastrointestinal: Negative.        No Known Allergies      Current Outpatient Medications:   •  fluticasone (FLONASE) 50 MCG/ACT nasal spray, 2 sprays into the nostril(s) as directed by provider Daily., Disp: , Rfl:   •  guaiFENesin (MUCINEX) 600 MG 12 hr tablet, Take 1,200 mg by mouth 2 (Two) Times a Day., Disp: , Rfl:       Objective:     Vitals:    03/01/22 1444   BP: 120/80   Pulse: 103   Weight: 110 kg (243 lb)   Height: 180.3 cm (71\")     Body mass index is 33.89 kg/m².    PHYSICAL EXAM:    Vitals and nursing note reviewed.   Constitutional:       General: Not in acute distress.     Appearance: Normal and healthy appearance.      Comments: Pacemaker site well-healed.   Pulmonary:      Effort: Pulmonary effort is normal. No respiratory distress.      Breath sounds: Normal breath sounds.   Cardiovascular:      Normal rate. Regular rhythm.   Edema:     Peripheral edema absent.   Skin:     General: Skin is warm and dry.   Neurological:      General: No focal deficit present.      Mental Status: Alert and oriented to person, place, and time.   Psychiatric:         Attention and Perception: Attention and perception normal.         Mood and Affect: Mood and affect normal.         Behavior: Behavior normal. Behavior is cooperative.         Thought Content: " Thought content normal.         Judgment: Judgment normal.             ECG 12 Lead    Date/Time: 3/1/2022 3:14 PM  Performed by: Bryn Gordon MD  Authorized by: Bryn Gordon MD   Comparison: compared with previous ECG from 1/7/2022  Comparison to previous ECG: Compared to prior echo, rhythm is now paced.  Rhythm: sinus rhythm and paced              Assessment:       Diagnosis Plan   1. Cardiomyopathy, dilated (HCC)  Adult Transthoracic Echo Complete w/ Color, Spectral and Contrast if Necessary Per Protocol   2. Third degree heart block (HCC)     3. Shortness of breath            Plan:       I am concerned about possible pacemaker mediated cardiomyopathy.  We will get an echocardiogram to assess his ejection fraction.  Otherwise his pacing parameters appear normal.  He does sound congested, offered referral to ENT, but we will see what echo demonstrates first.    As always, it has been a pleasure to participate in your patient's care.      Sincerely,         Bryn Gordon MD

## 2022-03-15 ENCOUNTER — HOSPITAL ENCOUNTER (OUTPATIENT)
Dept: CARDIOLOGY | Facility: HOSPITAL | Age: 77
Discharge: HOME | End: 2022-03-15
Admitting: INTERNAL MEDICINE

## 2022-03-15 VITALS
HEART RATE: 99 BPM | OXYGEN SATURATION: 96 % | HEIGHT: 71 IN | WEIGHT: 243 LBS | SYSTOLIC BLOOD PRESSURE: 118 MMHG | DIASTOLIC BLOOD PRESSURE: 80 MMHG | BODY MASS INDEX: 34.02 KG/M2

## 2022-03-15 DIAGNOSIS — I42.0 CARDIOMYOPATHY, DILATED: ICD-10-CM

## 2022-03-15 LAB
AORTIC ARCH: 3 CM
ASCENDING AORTA: 3.6 CM
BH CV ECHO LEFT VENTRICLE GLOBAL LONGITUDINAL STRAIN: -10 %
BH CV ECHO MEAS - ACS: 2.03 CM
BH CV ECHO MEAS - AO MAX PG: 10.8 MMHG
BH CV ECHO MEAS - AO MEAN PG: 7.3 MMHG
BH CV ECHO MEAS - AO ROOT DIAM: 3.5 CM
BH CV ECHO MEAS - AO V2 MAX: 164.2 CM/SEC
BH CV ECHO MEAS - AO V2 VTI: 27.5 CM
BH CV ECHO MEAS - AVA(I,D): 2.49 CM2
BH CV ECHO MEAS - EDV(CUBED): 116.2 ML
BH CV ECHO MEAS - EDV(MOD-SP2): 150 ML
BH CV ECHO MEAS - EDV(MOD-SP4): 152 ML
BH CV ECHO MEAS - EF(MOD-BP): 45.1 %
BH CV ECHO MEAS - EF(MOD-SP2): 42 %
BH CV ECHO MEAS - EF(MOD-SP4): 46.1 %
BH CV ECHO MEAS - EF_3D-VOL: 49 %
BH CV ECHO MEAS - ESV(CUBED): 68.6 ML
BH CV ECHO MEAS - ESV(MOD-SP2): 87 ML
BH CV ECHO MEAS - ESV(MOD-SP4): 82 ML
BH CV ECHO MEAS - FS: 16.1 %
BH CV ECHO MEAS - IVS/LVPW: 0.96 CM
BH CV ECHO MEAS - IVSD: 1.26 CM
BH CV ECHO MEAS - LAT PEAK E' VEL: 18 CM/SEC
BH CV ECHO MEAS - LV DIASTOLIC VOL/BSA (35-75): 66.4 CM2
BH CV ECHO MEAS - LV MASS(C)D: 248.8 GRAMS
BH CV ECHO MEAS - LV MAX PG: 5.2 MMHG
BH CV ECHO MEAS - LV MEAN PG: 3.3 MMHG
BH CV ECHO MEAS - LV SYSTOLIC VOL/BSA (12-30): 35.8 CM2
BH CV ECHO MEAS - LV V1 MAX: 113.5 CM/SEC
BH CV ECHO MEAS - LV V1 VTI: 20.4 CM
BH CV ECHO MEAS - LVIDD: 4.9 CM
BH CV ECHO MEAS - LVIDS: 4.1 CM
BH CV ECHO MEAS - LVOT AREA: 3.4 CM2
BH CV ECHO MEAS - LVOT DIAM: 2.07 CM
BH CV ECHO MEAS - LVPWD: 1.32 CM
BH CV ECHO MEAS - MED PEAK E' VEL: 9.1 CM/SEC
BH CV ECHO MEAS - MV DEC SLOPE: 899.5 CM/SEC2
BH CV ECHO MEAS - MV DEC TIME: 0.12 MSEC
BH CV ECHO MEAS - MV E MAX VEL: 140 CM/SEC
BH CV ECHO MEAS - MV MAX PG: 8.4 MMHG
BH CV ECHO MEAS - MV MEAN PG: 4.8 MMHG
BH CV ECHO MEAS - MV V2 VTI: 23.3 CM
BH CV ECHO MEAS - MVA(VTI): 2.9 CM2
BH CV ECHO MEAS - PA ACC TIME: 0.07 SEC
BH CV ECHO MEAS - PA PR(ACCEL): 46.9 MMHG
BH CV ECHO MEAS - PA V2 MAX: 103.2 CM/SEC
BH CV ECHO MEAS - PULM DIAS VEL: 45.7 CM/SEC
BH CV ECHO MEAS - PULM SYS VEL: 53.6 CM/SEC
BH CV ECHO MEAS - RAP SYSTOLE: 3 MMHG
BH CV ECHO MEAS - RV MAX PG: 3.3 MMHG
BH CV ECHO MEAS - RV V1 MAX: 90.7 CM/SEC
BH CV ECHO MEAS - RV V1 VTI: 16.4 CM
BH CV ECHO MEAS - RVOT DIAM: 2.13 CM
BH CV ECHO MEAS - SI(MOD-SP2): 27.5 ML/M2
BH CV ECHO MEAS - SI(MOD-SP4): 30.6 ML/M2
BH CV ECHO MEAS - SV(LVOT): 68.6 ML
BH CV ECHO MEAS - SV(MOD-SP2): 63 ML
BH CV ECHO MEAS - SV(MOD-SP4): 70 ML
BH CV ECHO MEAS - SV(RVOT): 58.5 ML
BH CV ECHO MEAS - TAPSE (>1.6): 2.34 CM
BH CV ECHO MEASUREMENTS AVERAGE E/E' RATIO: 10.33
BH CV VAS BP RIGHT ARM: NORMAL MMHG
BH CV XLRA - RV BASE: 2.9 CM
BH CV XLRA - RV LENGTH: 7.7 CM
BH CV XLRA - RV MID: 2.7 CM
BH CV XLRA - TDI S': 11.5 CM/SEC
LEFT ATRIUM VOLUME INDEX: 29.3 ML/M2
MAXIMAL PREDICTED HEART RATE: 145 BPM
SINUS: 3.1 CM
STJ: 3.3 CM
STRESS TARGET HR: 123 BPM

## 2022-03-15 PROCEDURE — 93356 MYOCRD STRAIN IMG SPCKL TRCK: CPT

## 2022-03-15 PROCEDURE — 93306 TTE W/DOPPLER COMPLETE: CPT

## 2022-03-15 PROCEDURE — 25010000002 PERFLUTREN (DEFINITY) 8.476 MG IN SODIUM CHLORIDE (PF) 0.9 % 10 ML INJECTION: Performed by: INTERNAL MEDICINE

## 2022-03-15 PROCEDURE — 93356 MYOCRD STRAIN IMG SPCKL TRCK: CPT | Performed by: INTERNAL MEDICINE

## 2022-03-15 PROCEDURE — 93306 TTE W/DOPPLER COMPLETE: CPT | Performed by: INTERNAL MEDICINE

## 2022-03-15 RX ADMIN — PERFLUTREN 1.5 ML: 6.52 INJECTION, SUSPENSION INTRAVENOUS at 08:23

## 2022-03-18 ENCOUNTER — OFFICE VISIT (OUTPATIENT)
Dept: INTERNAL MEDICINE | Facility: CLINIC | Age: 77
End: 2022-03-18

## 2022-03-18 VITALS
HEART RATE: 115 BPM | HEIGHT: 71 IN | DIASTOLIC BLOOD PRESSURE: 84 MMHG | OXYGEN SATURATION: 98 % | WEIGHT: 244 LBS | TEMPERATURE: 95.5 F | SYSTOLIC BLOOD PRESSURE: 130 MMHG | BODY MASS INDEX: 34.16 KG/M2

## 2022-03-18 DIAGNOSIS — J32.9 CHRONIC RHINOSINUSITIS: ICD-10-CM

## 2022-03-18 DIAGNOSIS — Z11.59 NEED FOR HEPATITIS C SCREENING TEST: ICD-10-CM

## 2022-03-18 DIAGNOSIS — Z13.220 SCREENING FOR HYPERLIPIDEMIA: ICD-10-CM

## 2022-03-18 DIAGNOSIS — J31.0 CHRONIC RHINOSINUSITIS: ICD-10-CM

## 2022-03-18 DIAGNOSIS — J34.2 DEVIATED NASAL SEPTUM: Primary | ICD-10-CM

## 2022-03-18 PROCEDURE — 99204 OFFICE O/P NEW MOD 45 MIN: CPT | Performed by: STUDENT IN AN ORGANIZED HEALTH CARE EDUCATION/TRAINING PROGRAM

## 2022-03-18 NOTE — PROGRESS NOTES
"  Bradley Retana D.O.  Internal Medicine  [facility]  [address]  [place] [address]  [phone]      Chief Complaint  Establish Care    SUBJECTIVE    History of Present Illness    Orion Zepeda is a 76 y.o. male who presents to the office today as a new patient to establish care.     States he has been having \"a lot\" of trouble with breathing, starting in November around Thanksgiving 2021. States just walking from the parking lot into the office he had to stop to take a break. States his \"head is all stopped up\", he goes through a box of kleenex day. States \"it's all up here in the head\". States he has an occasional cough in the evening and night. States he felt clogged above his eyes, his eyes water a lot. States he feels that his nose is blocked and he wakes up several times per night because his mouth will close and he will not be able to breathe through his nose. Reports having a bone in his nose that almost blacks his entire right nose. States \"as long as his nose is clear\" he has no trouble breathing whatsoever.      States the tylenol sinus headache helps decrease his sinus drainage. States the flonase will help if his nose is cleared.     No fevers.     No Known Allergies     Outpatient Medications Marked as Taking for the 3/18/22 encounter (Office Visit) with Bradley Retana, DO   Medication Sig Dispense Refill   • fluticasone (FLONASE) 50 MCG/ACT nasal spray 2 sprays into the nostril(s) as directed by provider Daily.     • Phenylephrine-Acetaminophen (TYLENOL SINUS+HEADACHE PO) Take  by mouth.          Past Medical History:   Diagnosis Date   • Complete heart block (HCC) 2019   • Dislocated shoulder, right, subsequent encounter 2011   • Syncope 2018     Past Surgical History:   Procedure Laterality Date   • CARDIAC ELECTROPHYSIOLOGY PROCEDURE N/A 05/05/2019    Procedure: Temporary Pacemaker;  Surgeon: Vnonie Jeffries MD;  Location: [facility] INVASIVE LOCATION;  " "Service: Cardiology   • CARDIAC ELECTROPHYSIOLOGY PROCEDURE N/A 05/06/2019    Procedure: Pacemaker DC new  [place];  Surgeon: Bryn Gordon MD;  [facility];  Service: Cardiovascular   • CARDIAC ELECTROPHYSIOLOGY PROCEDURE N/A 05/05/2019    Procedure: Temporary Pacemaker- Reposition;  Surgeon: Vonnie Jeffries MD;  Location:  [facility] CATH INVASIVE LOCATION;  Service: Cardiology   • DIAGNOSTIC LAPAROSCOPY N/A 09/15/2019    Procedure: DIAGNOSTIC LAPAROSCOPY;  Surgeon: Sonya Sheikh MD;  Location: [facility] MAIN OR;  Service: General   • INGUINAL HERNIA REPAIR N/A 09/15/2019    Procedure: left INGUINAL HERNIA REPAIR LAPAROSCOPIC, REDUCTION OF SMALL BOWEL;  Surgeon: Sonya Sheikh MD;  Location: [facility] MAIN OR;  Service: General   • PACEMAKER IMPLANTATION     • SHOULDER SURGERY Right     in his 20s; ligamentous surgery     Family History   Problem Relation Age of Onset   • Stroke Mother    • Tuberculosis Mother    • Lung cancer Father    • Rheumatic fever Brother     reports that he quit smoking about 3 years ago. He has a 50.00 pack-year smoking history. He has never used smokeless tobacco. He reports current drug use. Frequency: 5.00 times per week. Drug: Marijuana. He reports that he does not drink alcohol.    OBJECTIVE    Vital Signs:   /84   Pulse 115   Temp 95.5 °F (35.3 °C) (Temporal)   Ht 180.3 cm (71\")   Wt 111 kg (244 lb)   SpO2 98%   BMI 34.03 kg/m²     Physical Exam  Vitals reviewed.   Constitutional:       General: He is not in acute distress.     Appearance: Normal appearance. He is obese. He is not ill-appearing.   HENT:      Head: Atraumatic.      Right Ear: Tympanic membrane, ear canal and external ear normal. There is no impacted cerumen.      Left Ear: Tympanic membrane, ear canal and external ear normal. There is no impacted cerumen.      Nose: Septal deviation (right nare approximately 75% occluded) and mucosal edema (left) present. No nasal " tenderness.      Right Sinus: No maxillary sinus tenderness or frontal sinus tenderness.      Left Sinus: No maxillary sinus tenderness or frontal sinus tenderness.      Mouth/Throat:      Mouth: Mucous membranes are moist.      Pharynx: No oropharyngeal exudate or posterior oropharyngeal erythema.   Eyes:      General: No scleral icterus.  Cardiovascular:      Rate and Rhythm: Regular rhythm. Tachycardia present.      Heart sounds: Normal heart sounds. No murmur heard.  Pulmonary:      Effort: No respiratory distress.      Breath sounds: Normal breath sounds. No wheezing or rhonchi.      Comments: Mouth breathing  Musculoskeletal:      Right lower leg: No edema.      Left lower leg: No edema.   Neurological:      Mental Status: He is alert.   Psychiatric:         Mood and Affect: Mood normal.         Behavior: Behavior normal.         Thought Content: Thought content normal.                             ASSESSMENT & PLAN     Diagnoses and all orders for this visit:    1. Deviated nasal septum (Primary)  2. Chronic rhinosinusitis  -pt presents to the office as a new patient to establish care and also reports 4 months of nasal congestion, runny nose that is sometimes yellow and sometimes clear, intermittent sinus pressure and sensation that he cannot breathe through his nose at all and feels short of air if he doesn't breathe through his mouth; he is adamant that if at least one side of his nose is open he does not have issues breathing; he has been afebrile and has minimal cough or sputum production  -he had been using flonase OTC nasal spray as well as Tylenol sinus headache which improved symptoms some but he stopped the Flonase after 3 days due to concerns of overuse  -On exam, the patient is afebrile, satting 98% on room air, significant mouth breathing.  Ear exam is within normal limits and the nose demonstrates septal deviation to the right with approximately 75% occluded right naris as well as mucosal edema on  the left.  There is no sinus tenderness.  Oropharynx is clear.  He is slightly tachycardic. Lungs are clear to auscultation.  -pt's cardiologist was worried about a cardiomyopathy being related to his heavy breathing but TTE on 3/15/2022 showed ejection fraction 45% and only grade 1 diastolic dysfunction and mildly calcified sclerotic aortic valve without any significant stenosis or regurgitation. He has no sign of volume overload on exam.   -for his septal deviation and chronic rhinosinusitis I would like for him to see ENT for more thorough exam  -I do not believe he has an underling lung etiology at this time, as he reports complete resolution of symptoms once at least one side of his nose is not congested  -for now, advised pt to resume daily flonase, can use afrin nasal spray for 3 days for symptomatic relief, begin daily claritin, and 3 time daily nasal saline rinses   -     Ambulatory Referral to ENT (Otolaryngology)    3. Screening for hyperlipidemia  -     Lipid Panel With LDL/HDL Ratio    4. Need for hepatitis C screening test  -     Hepatitis C antibody            The following social determinates of health impact the patient's medical decision making: No social determinates of health were factored in to today's visit.     Follow Up  Return in about 4 weeks (around 4/15/2022) for Medicare Wellness.    Patient/family had no further questions at this time and verbalized understanding of the plan discussed today.

## 2022-03-19 LAB
ALBUMIN SERPL-MCNC: 4.3 G/DL (ref 3.7–4.7)
ALBUMIN/GLOB SERPL: 1.7 {RATIO} (ref 1.2–2.2)
ALP SERPL-CCNC: 79 IU/L (ref 44–121)
ALT SERPL-CCNC: 27 IU/L (ref 0–44)
AST SERPL-CCNC: 20 IU/L (ref 0–40)
BASOPHILS # BLD AUTO: 0.1 X10E3/UL (ref 0–0.2)
BASOPHILS NFR BLD AUTO: 1 %
BILIRUB SERPL-MCNC: 0.8 MG/DL (ref 0–1.2)
BUN SERPL-MCNC: 16 MG/DL (ref 8–27)
BUN/CREAT SERPL: 14 (ref 10–24)
CALCIUM SERPL-MCNC: 9.7 MG/DL (ref 8.6–10.2)
CHLORIDE SERPL-SCNC: 107 MMOL/L (ref 96–106)
CHOLEST SERPL-MCNC: 251 MG/DL (ref 100–199)
CO2 SERPL-SCNC: 23 MMOL/L (ref 20–29)
CREAT SERPL-MCNC: 1.12 MG/DL (ref 0.76–1.27)
EGFRCR SERPLBLD CKD-EPI 2021: 69 ML/MIN/1.73
EOSINOPHIL # BLD AUTO: 0.2 X10E3/UL (ref 0–0.4)
EOSINOPHIL NFR BLD AUTO: 2 %
ERYTHROCYTE [DISTWIDTH] IN BLOOD BY AUTOMATED COUNT: 12 % (ref 11.6–15.4)
GLOBULIN SER CALC-MCNC: 2.6 G/DL (ref 1.5–4.5)
GLUCOSE SERPL-MCNC: 97 MG/DL (ref 65–99)
HCT VFR BLD AUTO: 47.4 % (ref 37.5–51)
HCV AB S/CO SERPL IA: <0.1 S/CO RATIO (ref 0–0.9)
HDLC SERPL-MCNC: 40 MG/DL
HGB BLD-MCNC: 16 G/DL (ref 13–17.7)
IMM GRANULOCYTES # BLD AUTO: 0 X10E3/UL (ref 0–0.1)
IMM GRANULOCYTES NFR BLD AUTO: 1 %
LDLC SERPL CALC-MCNC: 177 MG/DL (ref 0–99)
LDLC/HDLC SERPL: 4.4 RATIO (ref 0–3.6)
LYMPHOCYTES # BLD AUTO: 1.5 X10E3/UL (ref 0.7–3.1)
LYMPHOCYTES NFR BLD AUTO: 19 %
MCH RBC QN AUTO: 31.6 PG (ref 26.6–33)
MCHC RBC AUTO-ENTMCNC: 33.8 G/DL (ref 31.5–35.7)
MCV RBC AUTO: 94 FL (ref 79–97)
MONOCYTES # BLD AUTO: 0.5 X10E3/UL (ref 0.1–0.9)
MONOCYTES NFR BLD AUTO: 7 %
NEUTROPHILS # BLD AUTO: 5.8 X10E3/UL (ref 1.4–7)
NEUTROPHILS NFR BLD AUTO: 70 %
PLATELET # BLD AUTO: 304 X10E3/UL (ref 150–450)
POTASSIUM SERPL-SCNC: 4.3 MMOL/L (ref 3.5–5.2)
PROT SERPL-MCNC: 6.9 G/DL (ref 6–8.5)
RBC # BLD AUTO: 5.06 X10E6/UL (ref 4.14–5.8)
SODIUM SERPL-SCNC: 147 MMOL/L (ref 134–144)
TRIGL SERPL-MCNC: 180 MG/DL (ref 0–149)
VLDLC SERPL CALC-MCNC: 34 MG/DL (ref 5–40)
WBC # BLD AUTO: 8.1 X10E3/UL (ref 3.4–10.8)

## 2022-04-11 ENCOUNTER — TRANSCRIBE ORDERS (OUTPATIENT)
Dept: ADMINISTRATIVE | Facility: HOSPITAL | Age: 77
End: 2022-04-11

## 2022-04-11 DIAGNOSIS — J32.9 CHRONIC SINUSITIS, UNSPECIFIED LOCATION: Primary | ICD-10-CM

## 2022-04-14 ENCOUNTER — LAB (OUTPATIENT)
Dept: LAB | Facility: HOSPITAL | Age: 77
End: 2022-04-14

## 2022-04-14 ENCOUNTER — TRANSCRIBE ORDERS (OUTPATIENT)
Dept: LAB | Facility: HOSPITAL | Age: 77
End: 2022-04-14

## 2022-04-14 ENCOUNTER — OFFICE VISIT (OUTPATIENT)
Dept: INTERNAL MEDICINE | Facility: CLINIC | Age: 77
End: 2022-04-14

## 2022-04-14 VITALS
HEIGHT: 71 IN | DIASTOLIC BLOOD PRESSURE: 90 MMHG | OXYGEN SATURATION: 97 % | TEMPERATURE: 98.2 F | HEART RATE: 103 BPM | WEIGHT: 243 LBS | SYSTOLIC BLOOD PRESSURE: 130 MMHG | BODY MASS INDEX: 34.02 KG/M2

## 2022-04-14 DIAGNOSIS — Z01.818 OTHER SPECIFIED PRE-OPERATIVE EXAMINATION: Primary | ICD-10-CM

## 2022-04-14 DIAGNOSIS — R06.02 SHORTNESS OF BREATH: Primary | ICD-10-CM

## 2022-04-14 LAB
ANION GAP SERPL CALCULATED.3IONS-SCNC: 11.1 MMOL/L (ref 5–15)
BASOPHILS # BLD AUTO: 0.05 10*3/MM3 (ref 0–0.2)
BASOPHILS NFR BLD AUTO: 0.6 % (ref 0–1.5)
BUN SERPL-MCNC: 16 MG/DL (ref 8–23)
BUN/CREAT SERPL: 15.5 (ref 7–25)
CALCIUM SPEC-SCNC: 9.4 MG/DL (ref 8.6–10.5)
CHLORIDE SERPL-SCNC: 107 MMOL/L (ref 98–107)
CO2 SERPL-SCNC: 24.9 MMOL/L (ref 22–29)
CREAT SERPL-MCNC: 1.03 MG/DL (ref 0.76–1.27)
D DIMER PPP FEU-MCNC: 0.46 MCGFEU/ML (ref 0–0.49)
DEPRECATED RDW RBC AUTO: 42.3 FL (ref 37–54)
EGFRCR SERPLBLD CKD-EPI 2021: 75.8 ML/MIN/1.73
EOSINOPHIL # BLD AUTO: 0.23 10*3/MM3 (ref 0–0.4)
EOSINOPHIL NFR BLD AUTO: 2.6 % (ref 0.3–6.2)
ERYTHROCYTE [DISTWIDTH] IN BLOOD BY AUTOMATED COUNT: 12.4 % (ref 12.3–15.4)
GLUCOSE SERPL-MCNC: 100 MG/DL (ref 65–99)
HCT VFR BLD AUTO: 46.6 % (ref 37.5–51)
HGB BLD-MCNC: 15.7 G/DL (ref 13–17.7)
IMM GRANULOCYTES # BLD AUTO: 0.04 10*3/MM3 (ref 0–0.05)
IMM GRANULOCYTES NFR BLD AUTO: 0.5 % (ref 0–0.5)
LYMPHOCYTES # BLD AUTO: 2.33 10*3/MM3 (ref 0.7–3.1)
LYMPHOCYTES NFR BLD AUTO: 26.8 % (ref 19.6–45.3)
MCH RBC QN AUTO: 31.5 PG (ref 26.6–33)
MCHC RBC AUTO-ENTMCNC: 33.7 G/DL (ref 31.5–35.7)
MCV RBC AUTO: 93.6 FL (ref 79–97)
MONOCYTES # BLD AUTO: 0.67 10*3/MM3 (ref 0.1–0.9)
MONOCYTES NFR BLD AUTO: 7.7 % (ref 5–12)
NEUTROPHILS NFR BLD AUTO: 5.37 10*3/MM3 (ref 1.7–7)
NEUTROPHILS NFR BLD AUTO: 61.8 % (ref 42.7–76)
NRBC BLD AUTO-RTO: 0 /100 WBC (ref 0–0.2)
NT-PROBNP SERPL-MCNC: 473 PG/ML (ref 0–1800)
PLATELET # BLD AUTO: 278 10*3/MM3 (ref 140–450)
PMV BLD AUTO: 9.4 FL (ref 6–12)
POTASSIUM SERPL-SCNC: 4.3 MMOL/L (ref 3.5–5.2)
RBC # BLD AUTO: 4.98 10*6/MM3 (ref 4.14–5.8)
SODIUM SERPL-SCNC: 143 MMOL/L (ref 136–145)
TROPONIN T SERPL-MCNC: <0.01 NG/ML (ref 0–0.03)
WBC NRBC COR # BLD: 8.69 10*3/MM3 (ref 3.4–10.8)

## 2022-04-14 PROCEDURE — 85379 FIBRIN DEGRADATION QUANT: CPT | Performed by: STUDENT IN AN ORGANIZED HEALTH CARE EDUCATION/TRAINING PROGRAM

## 2022-04-14 PROCEDURE — 85025 COMPLETE CBC W/AUTO DIFF WBC: CPT | Performed by: STUDENT IN AN ORGANIZED HEALTH CARE EDUCATION/TRAINING PROGRAM

## 2022-04-14 PROCEDURE — 80048 BASIC METABOLIC PNL TOTAL CA: CPT | Performed by: STUDENT IN AN ORGANIZED HEALTH CARE EDUCATION/TRAINING PROGRAM

## 2022-04-14 PROCEDURE — 99215 OFFICE O/P EST HI 40 MIN: CPT | Performed by: STUDENT IN AN ORGANIZED HEALTH CARE EDUCATION/TRAINING PROGRAM

## 2022-04-14 PROCEDURE — 84484 ASSAY OF TROPONIN QUANT: CPT | Performed by: STUDENT IN AN ORGANIZED HEALTH CARE EDUCATION/TRAINING PROGRAM

## 2022-04-14 PROCEDURE — 93000 ELECTROCARDIOGRAM COMPLETE: CPT | Performed by: STUDENT IN AN ORGANIZED HEALTH CARE EDUCATION/TRAINING PROGRAM

## 2022-04-14 PROCEDURE — 71046 X-RAY EXAM CHEST 2 VIEWS: CPT | Performed by: STUDENT IN AN ORGANIZED HEALTH CARE EDUCATION/TRAINING PROGRAM

## 2022-04-14 PROCEDURE — 36415 COLL VENOUS BLD VENIPUNCTURE: CPT | Performed by: STUDENT IN AN ORGANIZED HEALTH CARE EDUCATION/TRAINING PROGRAM

## 2022-04-14 PROCEDURE — 83880 ASSAY OF NATRIURETIC PEPTIDE: CPT | Performed by: STUDENT IN AN ORGANIZED HEALTH CARE EDUCATION/TRAINING PROGRAM

## 2022-04-14 RX ORDER — ALBUTEROL SULFATE 90 UG/1
2 AEROSOL, METERED RESPIRATORY (INHALATION) EVERY 4 HOURS PRN
Qty: 18 G | Refills: 1 | Status: SHIPPED | OUTPATIENT
Start: 2022-04-14

## 2022-04-14 NOTE — PROGRESS NOTES
"  Bradley Retana D.O.  Internal Medicine  [facility]  [address]  [place] [address]  [phone]      Chief Complaint  Dizziness (X 2 days) and Shortness of Breath (Since thanksgiving)    SUBJECTIVE    History of Present Illness    Orion Zepeda is a 76 y.o. male who presents to the office today as an established patient that last saw me on 3/18/2022. Here for acute care visit.    Pt reports having shortness of air symptom and lightheadedness and shortness of air with moving, walking, getting up and going down the stairs. Clarifies his earlier statement to say he doesn't really feel dizzy. Doesn't notice a difference in symptoms with head moving different directions. Walks about 50 feet and gets short of breath. No lower extremity edema, chest pain, cough, sputum production, fever or chills at home. He always has some baseline feeling of shortness of air because of his nasal septum deviation and nasal polyps that he is seeing ENT for but this has been different over the last few days. States he occasionally can hear himself wheezing. Previously smoked but has not smoked since 2019.    No Known Allergies     No outpatient medications have been marked as taking for the 4/14/22 encounter (Office Visit) with Bradley Retana DO.        Past Medical History:   Diagnosis Date   • Complete heart block (HCC) 2019   • Dislocated shoulder, right, subsequent encounter 2011   • Hyperlipidemia    • Syncope 2018       OBJECTIVE    Vital Signs:   /90   Pulse 103   Temp 98.2 °F (36.8 °C) (Temporal)   Ht 180.3 cm (71\")   Wt 110 kg (243 lb)   SpO2 97%   BMI 33.89 kg/m²     Physical Exam  Vitals reviewed.   Constitutional:       General: He is not in acute distress.     Appearance: Normal appearance. He is obese. He is not ill-appearing.   HENT:      Head: Normocephalic and atraumatic.   Eyes:      General: No scleral icterus.  Cardiovascular:      Rate and Rhythm: Regular rhythm. " Tachycardia present.      Heart sounds: Normal heart sounds. No murmur heard.  Pulmonary:      Effort: Pulmonary effort is normal. No respiratory distress.      Breath sounds: Normal breath sounds. No wheezing or rhonchi.      Comments: Frequently breathing through his mouth due which is chronic    Musculoskeletal:      Right lower leg: No edema.      Left lower leg: No edema.   Skin:     General: Skin is warm.   Neurological:      Mental Status: He is alert.   Psychiatric:         Mood and Affect: Mood normal.         Behavior: Behavior normal.         Thought Content: Thought content normal.                       ECG 12 Lead    Date/Time: 4/14/2022 12:07 PM  Performed by: Bradley Retana DO  Authorized by: Bradley Retana DO   Comparison: compared with previous ECG from 3/1/2022  Similar to previous ECG  Rhythm: paced  Rate: normal  Rate comments: 87  Other: no other findings  Clinical impression comment: ventricular paced rhythm                  ASSESSMENT & PLAN     Diagnoses and all orders for this visit:    1. Shortness of breath (Primary)  -Pt presents to the office for several days duration of acute on chronic shortness of air; he always feels a baseline shortness of air for the last several years due to severe nasal septal deviation and nasal polyps which he is seeing ENT for and they are considering surgical intervention. Over the last 3-4 days however, he reports increasing dyspnea and lightheadedness with activity, even just walking up to 50 feet or going up a flight of stairs. He denies chest pain or other systemic symptoms of illness in general.  -On exam, pt is afebrile , in no acute distress, and normotensive with negative orthostatic vital signs (standing 140/80 and sitting 130/90), slightly tachycardic which is baseline for him based off review of past records. Lungs are clear to auscultation. No signs of volume overload.  -EKG in office is compared to 3/1/22 and is overall unchanged and significant for  a paced rhythm  -per review of past records, pt already had TTE for shortness of breath in March 2022 that was significant for Calculated LVEF of 45.1% with mildly decreased left ventricular systolic function and mild septal wall hypokinesis.  Left ventricular diastolic function is consistent with grade 1 impaired relaxation and there was mildly calcified sclerotic aortic valve without any significant stenosis or regurgitation.  -I will obtain some stat labs today : BMP CBC D-Dimer, BNP, Troponin  -stat chest xray showed no acute findings   -will Rx albuterol for symptomatic shortness of air while stat labs are pending  -will send patient for formal PFT testing to determine if he has underlying COPD  -report to ER if increasing shortness of air, any change in symptoms , development of chest pain   -     Full Pulmonary Function Test With Bronchodilator; Future  -     XR Chest PA & Lateral (In Office)  -     albuterol sulfate  (90 Base) MCG/ACT inhaler; Inhale 2 puffs Every 4 (Four) Hours As Needed for Wheezing or Shortness of Air.  Dispense: 18 g; Refill: 1  -     Basic metabolic panel  -     CBC w AUTO Differential  -     D-dimer, Quantitative  -     proBNP  -     Troponin      I spent 51 minutes caring for Orion on this date of service. This time includes time spent by me in the following activities: preparing for the visit, performing a medically appropriate examination and/or evaluation, counseling and educating the patient/family/caregiver, ordering medications, tests, or procedures and documenting information in the medical record.         The following social determinates of health impact the patient's medical decision making: No social determinates of health were factored in to today's visit.     Follow Up  No follow-ups on file.    Patient/family had no further questions at this time and verbalized understanding of the plan discussed today.

## 2022-04-26 ENCOUNTER — LAB (OUTPATIENT)
Dept: LAB | Facility: HOSPITAL | Age: 77
End: 2022-04-26

## 2022-04-26 DIAGNOSIS — Z01.818 OTHER SPECIFIED PRE-OPERATIVE EXAMINATION: ICD-10-CM

## 2022-04-26 LAB — SARS-COV-2 ORF1AB RESP QL NAA+PROBE: NOT DETECTED

## 2022-04-26 PROCEDURE — U0005 INFEC AGEN DETEC AMPLI PROBE: HCPCS

## 2022-04-26 PROCEDURE — U0004 COV-19 TEST NON-CDC HGH THRU: HCPCS

## 2022-04-26 PROCEDURE — C9803 HOPD COVID-19 SPEC COLLECT: HCPCS

## 2022-04-27 ENCOUNTER — HOSPITAL ENCOUNTER (OUTPATIENT)
Dept: RESPIRATORY THERAPY | Facility: HOSPITAL | Age: 77
Discharge: HOME | End: 2022-04-27
Admitting: STUDENT IN AN ORGANIZED HEALTH CARE EDUCATION/TRAINING PROGRAM

## 2022-04-27 DIAGNOSIS — R06.02 SHORTNESS OF BREATH: ICD-10-CM

## 2022-04-27 PROCEDURE — 94060 EVALUATION OF WHEEZING: CPT

## 2022-04-27 PROCEDURE — 94664 DEMO&/EVAL PT USE INHALER: CPT

## 2022-04-27 PROCEDURE — 94726 PLETHYSMOGRAPHY LUNG VOLUMES: CPT

## 2022-04-27 RX ORDER — ALBUTEROL SULFATE 2.5 MG/3ML
2.5 SOLUTION RESPIRATORY (INHALATION) ONCE AS NEEDED
Status: COMPLETED | OUTPATIENT
Start: 2022-04-27 | End: 2022-04-27

## 2022-04-27 RX ADMIN — ALBUTEROL SULFATE 2.5 MG: 2.5 SOLUTION RESPIRATORY (INHALATION) at 15:05

## 2022-04-28 ENCOUNTER — TELEPHONE (OUTPATIENT)
Dept: INTERNAL MEDICINE | Facility: CLINIC | Age: 77
End: 2022-04-28

## 2022-04-28 NOTE — TELEPHONE ENCOUNTER
Pt informed and understands]          ----- Message from Bradley Retana DO sent at 4/27/2022 10:58 PM EDT -----  Please let pt know his pulmonary function test is normal. No COPD or asthma.

## 2022-05-05 ENCOUNTER — OFFICE VISIT (OUTPATIENT)
Dept: INTERNAL MEDICINE | Facility: CLINIC | Age: 77
End: 2022-05-05

## 2022-05-05 VITALS
DIASTOLIC BLOOD PRESSURE: 84 MMHG | SYSTOLIC BLOOD PRESSURE: 110 MMHG | OXYGEN SATURATION: 98 % | HEIGHT: 71 IN | WEIGHT: 240 LBS | BODY MASS INDEX: 33.6 KG/M2 | HEART RATE: 81 BPM

## 2022-05-05 DIAGNOSIS — R53.83 FATIGUE, UNSPECIFIED TYPE: ICD-10-CM

## 2022-05-05 DIAGNOSIS — Z00.00 MEDICARE ANNUAL WELLNESS VISIT, SUBSEQUENT: Primary | ICD-10-CM

## 2022-05-05 DIAGNOSIS — E78.2 MIXED HYPERLIPIDEMIA: ICD-10-CM

## 2022-05-05 DIAGNOSIS — Z12.11 COLON CANCER SCREENING: ICD-10-CM

## 2022-05-05 PROCEDURE — G0439 PPPS, SUBSEQ VISIT: HCPCS | Performed by: STUDENT IN AN ORGANIZED HEALTH CARE EDUCATION/TRAINING PROGRAM

## 2022-05-05 PROCEDURE — 1159F MED LIST DOCD IN RCRD: CPT | Performed by: STUDENT IN AN ORGANIZED HEALTH CARE EDUCATION/TRAINING PROGRAM

## 2022-05-05 PROCEDURE — 99214 OFFICE O/P EST MOD 30 MIN: CPT | Performed by: STUDENT IN AN ORGANIZED HEALTH CARE EDUCATION/TRAINING PROGRAM

## 2022-05-05 PROCEDURE — 1170F FXNL STATUS ASSESSED: CPT | Performed by: STUDENT IN AN ORGANIZED HEALTH CARE EDUCATION/TRAINING PROGRAM

## 2022-05-05 PROCEDURE — 1126F AMNT PAIN NOTED NONE PRSNT: CPT | Performed by: STUDENT IN AN ORGANIZED HEALTH CARE EDUCATION/TRAINING PROGRAM

## 2022-05-05 RX ORDER — ATORVASTATIN CALCIUM 40 MG/1
40 TABLET, FILM COATED ORAL DAILY
Qty: 90 TABLET | Refills: 1 | Status: SHIPPED | OUTPATIENT
Start: 2022-05-05 | End: 2022-08-05 | Stop reason: SDUPTHER

## 2022-05-05 NOTE — PATIENT INSTRUCTIONS
Medicare Wellness  Personal Prevention Plan of Service     Date of Office Visit:    Encounter Provider:  Bradley Retana DO  Place of Service:  [facility]  Patient Name: Orion Zepeda  :  1945    As part of the Medicare Wellness portion of your visit today, we are providing you with this personalized preventive plan of services (PPPS). This plan is based upon recommendations of the [place] Preventive Services Task Force (USPSTF) and the Advisory Committee on Immunization Practices (ACIP).    This lists the preventive care services that should be considered, and provides dates of when you are due. Items listed as completed are up-to-date and do not require any further intervention.    Health Maintenance   Topic Date Due    COLORECTAL CANCER SCREENING  Never done    TDAP/TD VACCINES (1 - Tdap) Never done    ZOSTER VACCINE (1 of 2) Never done    Pneumococcal Vaccine 65+ (1 - PCV) Never done    LUNG CANCER SCREENING  2023 (Originally 1996)    INFLUENZA VACCINE  2022    LIPID PANEL  2023    ANNUAL WELLNESS VISIT  2023    HEPATITIS C SCREENING  Completed    COVID-19 Vaccine  Completed       Orders Placed This Encounter   Procedures    TSH Rfx On Abnormal To Free T4     Order Specific Question:   Release to patient     Answer:   Immediate    Cologuard - Stool, Per Rectum     Standing Status:   Future     Standing Expiration Date:   2023     Order Specific Question:   Release to patient     Answer:   Immediate       Return in about 3 months (around 2022) for Recheck.

## 2022-05-05 NOTE — PROGRESS NOTES
The ABCs of the Annual Wellness Visit  Subsequent Medicare Wellness Visit    Chief Complaint   Patient presents with   • Medicare Wellness-subsequent      Subjective    History of Present Illness:  Orion Zepeda is a 76 y.o. male who presents for a Subsequent Medicare Wellness Visit.    The following portions of the patient's history were reviewed and   updated as appropriate: allergies, current medications, past family history, past medical history, past social history, past surgical history and problem list.    Compared to one year ago, the patient feels his physical   health is worse. States that he feels low energy, also has nasal surgery coming up this year.     Compared to one year ago, the patient feels his mental   health is the same.    Recent Hospitalizations:  He was not admitted to the hospital during the last year.       Current Medical Providers:  Patient Care Team:  Bradley Retana DO as PCP - General (Internal Medicine)    Outpatient Medications Prior to Visit   Medication Sig Dispense Refill   • albuterol sulfate  (90 Base) MCG/ACT inhaler Inhale 2 puffs Every 4 (Four) Hours As Needed for Wheezing or Shortness of Air. 18 g 1   • fluticasone (FLONASE) 50 MCG/ACT nasal spray 2 sprays into the nostril(s) as directed by provider Daily.     • guaiFENesin (MUCINEX) 600 MG 12 hr tablet Take 1,200 mg by mouth 2 (Two) Times a Day.     • Phenylephrine-Acetaminophen (TYLENOL SINUS+HEADACHE PO) Take  by mouth.       No facility-administered medications prior to visit.       No opioid medication identified on active medication list. I have reviewed chart for other potential  high risk medication/s and harmful drug interactions in the elderly.          Aspirin is not on active medication list.  Aspirin use is not indicated based on review of current medical condition/s. Risk of harm outweighs potential benefits.  .    Patient Active Problem List   Diagnosis   • Third degree heart block (HCC)   • Syncope  "  • Shortness of breath     Advance Care Planning  Advance Directive is not on file.  ACP discussion was held with the patient during this visit. Patient has an advance directive (not in EMR), copy requested.          Objective    Vitals:    05/05/22 1111   BP: 110/84   Pulse: 81   SpO2: 98%   Weight: 109 kg (240 lb)   Height: 180.3 cm (70.98\")   PainSc: 0-No pain     BMI Readings from Last 1 Encounters:   05/05/22 33.49 kg/m²   BMI is above normal parameters. Recommendations include: exercise counseling  Pt feels overall that he cannot exercise due to his physical health.     Does the patient have evidence of cognitive impairment? No    Physical Exam  Vitals reviewed.   Constitutional:       General: He is not in acute distress.     Appearance: Normal appearance. He is obese. He is not ill-appearing.   HENT:      Head: Normocephalic and atraumatic.   Eyes:      General: No scleral icterus.  Cardiovascular:      Rate and Rhythm: Normal rate and regular rhythm.      Heart sounds: Normal heart sounds. No murmur heard.  Pulmonary:      Effort: Pulmonary effort is normal. No respiratory distress.      Breath sounds: Normal breath sounds. No wheezing or rhonchi.   Musculoskeletal:      Right lower leg: No edema.      Left lower leg: No edema.   Neurological:      Mental Status: He is alert.   Psychiatric:         Mood and Affect: Mood normal.         Behavior: Behavior normal.         Thought Content: Thought content normal.       Lab Results   Component Value Date    CHLPL 251 (H) 03/18/2022    TRIG 180 (H) 03/18/2022    HDL 40 03/18/2022     (H) 03/18/2022    VLDL 34 03/18/2022            HEALTH RISK ASSESSMENT    Smoking Status:  Social History     Tobacco Use   Smoking Status Former Smoker   • Packs/day: 1.00   • Years: 50.00   • Pack years: 50.00   • Quit date: 2019   • Years since quitting: 3.3   Smokeless Tobacco Never Used     Alcohol Consumption:  Social History     Substance and Sexual Activity   Alcohol " Use Never     Fall Risk Screen:    STEADI Fall Risk Assessment was completed, and patient is at LOW risk for falls.Assessment completed on:5/5/2022    Depression Screening:  PHQ-2/PHQ-9 Depression Screening 5/5/2022   Little Interest or Pleasure in Doing Things 0-->not at all   Feeling Down, Depressed or Hopeless 0-->not at all   PHQ-9: Brief Depression Severity Measure Score 0       Health Habits and Functional and Cognitive Screening:  Functional & Cognitive Status 5/5/2022   Do you have difficulty preparing food and eating? No   Do you have difficulty bathing yourself, getting dressed or grooming yourself? No   Do you have difficulty using the toilet? No   Do you have difficulty moving around from place to place? No   Do you have trouble with steps or getting out of a bed or a chair? No   Current Diet Well Balanced Diet   Dental Exam Up to date   Eye Exam Up to date   Exercise (times per week) 0 times per week   Current Exercises Include No Regular Exercise   Do you need help using the phone?  No   Are you deaf or do you have serious difficulty hearing?  No   Do you need help with transportation? No   Do you need help shopping? No   Do you need help preparing meals?  No   Do you need help with housework?  No   Do you need help with laundry? No   Do you need help taking your medications? No   Do you need help managing money? No   Do you ever drive or ride in a car without wearing a seat belt? No   Have you felt unusual stress, anger or loneliness in the last month? No   Who do you live with? Alone   If you need help, do you have trouble finding someone available to you? No   Have you been bothered in the last four weeks by sexual problems? No   Do you have difficulty concentrating, remembering or making decisions? No       Age-appropriate Screening Schedule:  Refer to the list below for future screening recommendations based on patient's age, sex and/or medical conditions. Orders for these recommended tests are  listed in the plan section. The patient has been provided with a written plan.    Health Maintenance   Topic Date Due   • TDAP/TD VACCINES (1 - Tdap) Never done   • ZOSTER VACCINE (1 of 2) Never done   • INFLUENZA VACCINE  08/01/2022   • LIPID PANEL  03/18/2023              Assessment/Plan   CMS Preventative Services Quick Reference  Risk Factors Identified During Encounter  Immunizations Discussed/Encouraged (specific Immunizations; Td, Prevnar 20 (Pneumococcal 20-valent conjugate) and Shingrix) booster recommended at community pharmacy  Inactivity/Sedentary: currently limited 2/2 fatigue and breathing issues. He is scheduled for SEPTOPLASTY, SUBMUCOUS RESECTION INFERIOR TURBINATES, NASAL ENDOSCOPY in June 2022.   Obesity: Patient's (Body mass index is 33.49 kg/m².) indicates that they are obese (BMI >30) with health related conditions that include dyslipidemias . Weight is improving with lifestyle modifications. BMI is is above average; BMI management plan is completed. We discussed portion control and increasing exercise.   Pt refused lung cancer screening.    The above risks/problems have been discussed with the patient.  Follow up actions/plans if indicated are seen below in the Assessment/Plan Section.  Pertinent information has been shared with the patient in the After Visit Summary.    Diagnoses and all orders for this visit:    1. Mixed hyperlipidemia (Primary)  The 10-year ASCVD risk score (Titus DC Jr., et al., 2013) is: 22.5%    Values used to calculate the score:      Age: 76 years      Sex: Male      Is Non- : No      Diabetic: No      Tobacco smoker: No      Systolic Blood Pressure: 110 mmHg      Is BP treated: No      HDL Cholesterol: 40 mg/dL      Total Cholesterol: 251 mg/dL  -Discussed with patient the significance of his elevated ASCVD risk and LDL cholesterol. Introduced statin based therapy. Informed pt that statins are associated with rare risk of increased liver enzymes  as well as severe allergy. More common side effects include muscle aches and pains that usually go away within several weeks of therapy.   -he is agreeable to start, will begin atorvastatin 40 mg daily  -return to office in 3 months for follow up with fasting lipid profile then to assess for improvement  -     atorvastatin (Lipitor) 40 MG tablet; Take 1 tablet by mouth Daily.  Dispense: 90 tablet; Refill: 1    2. Fatigue, unspecified type  -possibly related to breathing issues 2/2 severe nasal septal deviation and turbinate hypertrophy  -will screening for hypothyroidism today  -further workup depending on improvement or lack thereof after his June 2022 surgery with ENT  -     TSH Rfx On Abnormal To Free T4        Follow Up:   Return in about 3 months (around 8/5/2022) for Recheck.         An After Visit Summary and PPPS were made available to the patient.

## 2022-05-06 LAB — TSH SERPL DL<=0.005 MIU/L-ACNC: 0.76 UIU/ML (ref 0.45–4.5)

## 2022-05-27 ENCOUNTER — TELEPHONE (OUTPATIENT)
Dept: INTERNAL MEDICINE | Facility: CLINIC | Age: 77
End: 2022-05-27

## 2022-05-27 NOTE — TELEPHONE ENCOUNTER
Caller: Orion Zepeda    Relationship: Self    Best call back number: [phone]*    What is the best time to reach you: ANYTIME    Who are you requesting to speak with (clinical staff, provider,  specific staff member): CLINICAL    What was the call regarding: PATIENT STATES HE WENT TO THE PHARMACY TO GET A PNEUMONIA VACCINE, AND THE PHARMACY ADVISED HIM THERE ARE 4 DIFFERENT KINDS AND BEFORE THEY CAN ADMINISTER, THEY WILL NEED TO KNOW WHICH ONE DR. CHONG WANTS THE PATIENT TO HAVE.    Do you require a callback: YES TO ADVISE.

## 2022-06-20 ENCOUNTER — PRE-ADMISSION TESTING (OUTPATIENT)
Dept: PREADMISSION TESTING | Facility: HOSPITAL | Age: 77
End: 2022-06-20

## 2022-06-20 VITALS
DIASTOLIC BLOOD PRESSURE: 88 MMHG | TEMPERATURE: 98.4 F | HEART RATE: 74 BPM | BODY MASS INDEX: 33.04 KG/M2 | SYSTOLIC BLOOD PRESSURE: 140 MMHG | WEIGHT: 236 LBS | RESPIRATION RATE: 18 BRPM | OXYGEN SATURATION: 97 % | HEIGHT: 71 IN

## 2022-06-20 LAB
ANION GAP SERPL CALCULATED.3IONS-SCNC: 11.6 MMOL/L (ref 5–15)
BUN SERPL-MCNC: 15 MG/DL (ref 8–23)
BUN/CREAT SERPL: 13.4 (ref 7–25)
CALCIUM SPEC-SCNC: 8.9 MG/DL (ref 8.6–10.5)
CHLORIDE SERPL-SCNC: 108 MMOL/L (ref 98–107)
CO2 SERPL-SCNC: 21.4 MMOL/L (ref 22–29)
CREAT SERPL-MCNC: 1.12 MG/DL (ref 0.76–1.27)
DEPRECATED RDW RBC AUTO: 39.7 FL (ref 37–54)
EGFRCR SERPLBLD CKD-EPI 2021: 68.5 ML/MIN/1.73
ERYTHROCYTE [DISTWIDTH] IN BLOOD BY AUTOMATED COUNT: 12.5 % (ref 12.3–15.4)
GLUCOSE SERPL-MCNC: 143 MG/DL (ref 65–99)
HCT VFR BLD AUTO: 41.6 % (ref 37.5–51)
HGB BLD-MCNC: 14.9 G/DL (ref 13–17.7)
MCH RBC QN AUTO: 31.9 PG (ref 26.6–33)
MCHC RBC AUTO-ENTMCNC: 35.8 G/DL (ref 31.5–35.7)
MCV RBC AUTO: 89.1 FL (ref 79–97)
PLATELET # BLD AUTO: 228 10*3/MM3 (ref 140–450)
PMV BLD AUTO: 9.2 FL (ref 6–12)
POTASSIUM SERPL-SCNC: 3.5 MMOL/L (ref 3.5–5.2)
RBC # BLD AUTO: 4.67 10*6/MM3 (ref 4.14–5.8)
SARS-COV-2 ORF1AB RESP QL NAA+PROBE: NOT DETECTED
SODIUM SERPL-SCNC: 141 MMOL/L (ref 136–145)
WBC NRBC COR # BLD: 7.6 10*3/MM3 (ref 3.4–10.8)

## 2022-06-20 PROCEDURE — 80048 BASIC METABOLIC PNL TOTAL CA: CPT

## 2022-06-20 PROCEDURE — C9803 HOPD COVID-19 SPEC COLLECT: HCPCS

## 2022-06-20 PROCEDURE — U0004 COV-19 TEST NON-CDC HGH THRU: HCPCS

## 2022-06-20 PROCEDURE — 36415 COLL VENOUS BLD VENIPUNCTURE: CPT

## 2022-06-20 PROCEDURE — 85027 COMPLETE CBC AUTOMATED: CPT

## 2022-06-20 PROCEDURE — U0005 INFEC AGEN DETEC AMPLI PROBE: HCPCS

## 2022-06-20 RX ORDER — LORATADINE 10 MG/1
10 CAPSULE, LIQUID FILLED ORAL DAILY
COMMUNITY
End: 2022-08-05

## 2022-06-20 NOTE — DISCHARGE INSTRUCTIONS
Take the following medications the morning of surgery:  NONE  BRING INHALER WITH YOU    If you are on prescription narcotic pain medication to control your pain you may also take that medication the morning of surgery.    General Instructions:  Do not eat solid food after midnight the night before surgery.  You may drink clear liquids day of surgery but must stop at least one hour before your hospital arrival time.  CLEAR LIQUIDS UNTIL 4:30 AM  It is beneficial for you to have a clear drink that contains carbohydrates the day of surgery.  We suggest a 12 to 20 ounce bottle of Gatorade or Powerade for non-diabetic patients or a 12 to 20 ounce bottle of G2 or Powerade Zero for diabetic patients. (Pediatric patients, are not advised to drink a 12 to 20 ounce carbohydrate drink)    Clear liquids are liquids you can see through.  Nothing red in color.     Plain water                               Sports drinks  Sodas                                   Gelatin (Jell-O)  Fruit juices without pulp such as white grape juice and apple juice  Popsicles that contain no fruit or yogurt  Tea or coffee (no cream or milk added)  Gatorade / Powerade  G2 / Powerade Zero    Infants may have breast milk up to four hours before surgery.  Infants drinking formula may drink formula up to six hours before surgery.   Patients who avoid smoking, chewing tobacco and alcohol for 4 weeks prior to surgery have a reduced risk of post-operative complications.  Quit smoking as many days before surgery as you can.  Do not smoke, use chewing tobacco or drink alcohol the day of surgery.   If applicable bring your C-PAP/ BI-PAP machine.  Bring any papers given to you in the doctor’s office.  Wear clean comfortable clothes.  Do not wear contact lenses, false eyelashes or make-up.  Bring a case for your glasses.   Bring crutches or walker if applicable.  Remove all piercings.  Leave jewelry and any other valuables at home.  Hair extensions with metal  clips must be removed prior to surgery.  The Pre-Admission Testing nurse will instruct you to bring medications if unable to obtain an accurate list in Pre-Admission Testing.        If you were given a blood bank ID arm band remember to bring it with you the day of surgery.    Preventing a Surgical Site Infection:  For 2 to 3 days before surgery, avoid shaving with a razor because the razor can irritate skin and make it easier to develop an infection.    Any areas of open skin can increase the risk of a post-operative wound infection by allowing bacteria to enter and travel throughout the body.  Notify your surgeon if you have any skin wounds / rashes even if it is not near the expected surgical site.  The area will need assessed to determine if surgery should be delayed until it is healed.  The night prior to surgery shower using a fresh bar of anti-bacterial soap (such as Dial) and clean washcloth.  Sleep in a clean bed with clean clothing.  Do not allow pets to sleep with you.  Shower on the morning of surgery using a fresh bar of anti-bacterial soap (such as Dial) and clean washcloth.  Dry with a clean towel and dress in clean clothing.  Ask your surgeon if you will be receiving antibiotics prior to surgery.  Make sure you, your family, and all healthcare providers clean their hands with soap and water or an alcohol based hand  before caring for you or your wound.    Day of surgery: 6/21/2022 ARRIVAL TIME 5:30 AM  Your arrival time is approximately two hours before your scheduled surgery time.  Upon arrival, a Pre-op nurse and Anesthesiologist will review your health history, obtain vital signs, and answer questions you may have.  The only belongings needed at this time will be a list of your home medications and if applicable your C-PAP/BI-PAP machine.  A Pre-op nurse will start an IV and you may receive medication in preparation for surgery, including something to help you relax.     Please be aware  that surgery does come with discomfort.  We want to make every effort to control your discomfort so please discuss any uncontrolled symptoms with your nurse.   Your doctor will most likely have prescribed pain medications.      If you are going home after surgery you will receive individualized written care instructions before being discharged.  A responsible adult must drive you to and from the hospital on the day of your surgery and stay with you for 24 hours.  Discharge prescriptions can be filled by the hospital pharmacy during regular pharmacy hours.  If you are having surgery late in the day/evening your prescription may be e-prescribed to your pharmacy.  Please verify your pharmacy hours or chose a 24 hour pharmacy to avoid not having access to your prescription because your pharmacy has closed for the day.    If you are staying overnight following surgery, you will be transported to your hospital room following the recovery period.  [facility] has all private rooms.    If you have any questions please call Pre-Admission Testing at [phone].  Deductibles and co-payments are collected on the day of service. Please be prepared to pay the required co-pay, deductible or deposit on the day of service as defined by your plan.    Patient Education for Self-Quarantine Process    Following your COVID testing, we strongly recommend that you wear a mask when you are with other people and practice social distancing.   Limit your activities to only required outings.  Wash your hands with soap and water frequently for at least 20 seconds.   Avoid touching your eyes, nose and mouth with unwashed hands.  Do not share anything - utensils, drinking glasses, food from the same bowl.   Sanitize household surfaces daily. Include all high touch areas (door handles, light switches, phones, countertops, etc.)    Call your surgeon immediately if you experience any of the following symptoms:  Sore Throat  Shortness  of Breath or difficulty breathing  Cough  Chills  Body soreness or muscle pain  Headache  Fever  New loss of taste or smell  Do not arrive for your surgery ill.  Your procedure will need to be rescheduled to another time.  You will need to call your physician before the day of surgery to avoid any unnecessary exposure to hospital staff as well as other patients.

## 2022-06-21 ENCOUNTER — HOSPITAL ENCOUNTER (OUTPATIENT)
Facility: HOSPITAL | Age: 77
Setting detail: HOSPITAL OUTPATIENT SURGERY
Discharge: HOME | End: 2022-06-21
Attending: OTOLARYNGOLOGY | Admitting: OTOLARYNGOLOGY

## 2022-06-21 ENCOUNTER — ANESTHESIA EVENT (OUTPATIENT)
Dept: PERIOP | Facility: HOSPITAL | Age: 77
End: 2022-06-21

## 2022-06-21 ENCOUNTER — ANESTHESIA (OUTPATIENT)
Dept: PERIOP | Facility: HOSPITAL | Age: 77
End: 2022-06-21

## 2022-06-21 VITALS
SYSTOLIC BLOOD PRESSURE: 123 MMHG | OXYGEN SATURATION: 93 % | HEART RATE: 100 BPM | TEMPERATURE: 98 F | DIASTOLIC BLOOD PRESSURE: 83 MMHG | RESPIRATION RATE: 18 BRPM

## 2022-06-21 DIAGNOSIS — J34.2 NASAL SEPTAL DEVIATION: Primary | ICD-10-CM

## 2022-06-21 PROCEDURE — 25010000002 PHENYLEPHRINE 10 MG/ML SOLUTION 5 ML VIAL: Performed by: NURSE ANESTHETIST, CERTIFIED REGISTERED

## 2022-06-21 PROCEDURE — 25010000002 HYDROMORPHONE PER 4 MG: Performed by: NURSE ANESTHETIST, CERTIFIED REGISTERED

## 2022-06-21 PROCEDURE — 25010000002 DEXAMETHASONE PER 1 MG: Performed by: NURSE ANESTHETIST, CERTIFIED REGISTERED

## 2022-06-21 PROCEDURE — 25010000002 NEOSTIGMINE 5 MG/10ML SOLUTION: Performed by: NURSE ANESTHETIST, CERTIFIED REGISTERED

## 2022-06-21 PROCEDURE — 25010000002 PROPOFOL 10 MG/ML EMULSION: Performed by: NURSE ANESTHETIST, CERTIFIED REGISTERED

## 2022-06-21 PROCEDURE — 0 EPINEPHRINE 1 MG/ML SOLUTION: Performed by: OTOLARYNGOLOGY

## 2022-06-21 PROCEDURE — 25010000002 ONDANSETRON PER 1 MG: Performed by: NURSE ANESTHETIST, CERTIFIED REGISTERED

## 2022-06-21 PROCEDURE — 25010000002 CEFAZOLIN IN DEXTROSE 2-4 GM/100ML-% SOLUTION: Performed by: OTOLARYNGOLOGY

## 2022-06-21 PROCEDURE — 25010000002 PHENYLEPHRINE 10 MG/ML SOLUTION: Performed by: NURSE ANESTHETIST, CERTIFIED REGISTERED

## 2022-06-21 PROCEDURE — 25010000002 FENTANYL CITRATE (PF) 50 MCG/ML SOLUTION: Performed by: NURSE ANESTHETIST, CERTIFIED REGISTERED

## 2022-06-21 RX ORDER — DEXMEDETOMIDINE HYDROCHLORIDE 100 UG/ML
INJECTION, SOLUTION INTRAVENOUS AS NEEDED
Status: DISCONTINUED | OUTPATIENT
Start: 2022-06-21 | End: 2022-06-21 | Stop reason: SURG

## 2022-06-21 RX ORDER — FENTANYL CITRATE 50 UG/ML
INJECTION, SOLUTION INTRAMUSCULAR; INTRAVENOUS AS NEEDED
Status: DISCONTINUED | OUTPATIENT
Start: 2022-06-21 | End: 2022-06-21 | Stop reason: SURG

## 2022-06-21 RX ORDER — SODIUM CHLORIDE, SODIUM LACTATE, POTASSIUM CHLORIDE, CALCIUM CHLORIDE 600; 310; 30; 20 MG/100ML; MG/100ML; MG/100ML; MG/100ML
9 INJECTION, SOLUTION INTRAVENOUS CONTINUOUS PRN
Status: DISCONTINUED | OUTPATIENT
Start: 2022-06-21 | End: 2022-06-21 | Stop reason: HOSPADM

## 2022-06-21 RX ORDER — LIDOCAINE HYDROCHLORIDE 20 MG/ML
INJECTION, SOLUTION INFILTRATION; PERINEURAL AS NEEDED
Status: DISCONTINUED | OUTPATIENT
Start: 2022-06-21 | End: 2022-06-21 | Stop reason: SURG

## 2022-06-21 RX ORDER — NEOSTIGMINE METHYLSULFATE 0.5 MG/ML
INJECTION, SOLUTION INTRAVENOUS AS NEEDED
Status: DISCONTINUED | OUTPATIENT
Start: 2022-06-21 | End: 2022-06-21 | Stop reason: SURG

## 2022-06-21 RX ORDER — PROPOFOL 10 MG/ML
VIAL (ML) INTRAVENOUS AS NEEDED
Status: DISCONTINUED | OUTPATIENT
Start: 2022-06-21 | End: 2022-06-21 | Stop reason: SURG

## 2022-06-21 RX ORDER — CEFAZOLIN SODIUM 2 G/100ML
2 INJECTION, SOLUTION INTRAVENOUS ONCE
Status: COMPLETED | OUTPATIENT
Start: 2022-06-21 | End: 2022-06-21

## 2022-06-21 RX ORDER — HYDROMORPHONE HCL 110MG/55ML
PATIENT CONTROLLED ANALGESIA SYRINGE INTRAVENOUS AS NEEDED
Status: DISCONTINUED | OUTPATIENT
Start: 2022-06-21 | End: 2022-06-21 | Stop reason: SURG

## 2022-06-21 RX ORDER — PHENYLEPHRINE HYDROCHLORIDE 10 MG/ML
INJECTION INTRAVENOUS AS NEEDED
Status: DISCONTINUED | OUTPATIENT
Start: 2022-06-21 | End: 2022-06-21 | Stop reason: SURG

## 2022-06-21 RX ORDER — PROMETHAZINE HYDROCHLORIDE 25 MG/1
25 TABLET ORAL ONCE AS NEEDED
Status: DISCONTINUED | OUTPATIENT
Start: 2022-06-21 | End: 2022-06-21 | Stop reason: HOSPADM

## 2022-06-21 RX ORDER — SODIUM CHLORIDE 9 MG/ML
INJECTION, SOLUTION INTRAVENOUS AS NEEDED
Status: DISCONTINUED | OUTPATIENT
Start: 2022-06-21 | End: 2022-06-21 | Stop reason: HOSPADM

## 2022-06-21 RX ORDER — GLYCOPYRROLATE 0.2 MG/ML
0.2 INJECTION INTRAMUSCULAR; INTRAVENOUS
Status: COMPLETED | OUTPATIENT
Start: 2022-06-21 | End: 2022-06-21

## 2022-06-21 RX ORDER — IBUPROFEN 600 MG/1
600 TABLET ORAL ONCE AS NEEDED
Status: DISCONTINUED | OUTPATIENT
Start: 2022-06-21 | End: 2022-06-21 | Stop reason: HOSPADM

## 2022-06-21 RX ORDER — SODIUM CHLORIDE 0.9 % (FLUSH) 0.9 %
10 SYRINGE (ML) INJECTION EVERY 12 HOURS SCHEDULED
Status: DISCONTINUED | OUTPATIENT
Start: 2022-06-21 | End: 2022-06-21 | Stop reason: HOSPADM

## 2022-06-21 RX ORDER — MIDAZOLAM HYDROCHLORIDE 1 MG/ML
0.5 INJECTION INTRAMUSCULAR; INTRAVENOUS
Status: DISCONTINUED | OUTPATIENT
Start: 2022-06-21 | End: 2022-06-21 | Stop reason: HOSPADM

## 2022-06-21 RX ORDER — SODIUM CHLORIDE 0.9 % (FLUSH) 0.9 %
10 SYRINGE (ML) INJECTION AS NEEDED
Status: DISCONTINUED | OUTPATIENT
Start: 2022-06-21 | End: 2022-06-21 | Stop reason: HOSPADM

## 2022-06-21 RX ORDER — FLUMAZENIL 0.1 MG/ML
0.2 INJECTION INTRAVENOUS AS NEEDED
Status: DISCONTINUED | OUTPATIENT
Start: 2022-06-21 | End: 2022-06-21 | Stop reason: HOSPADM

## 2022-06-21 RX ORDER — LIDOCAINE HYDROCHLORIDE AND EPINEPHRINE 10; 10 MG/ML; UG/ML
INJECTION, SOLUTION INFILTRATION; PERINEURAL AS NEEDED
Status: DISCONTINUED | OUTPATIENT
Start: 2022-06-21 | End: 2022-06-21 | Stop reason: HOSPADM

## 2022-06-21 RX ORDER — MAGNESIUM HYDROXIDE 1200 MG/15ML
LIQUID ORAL AS NEEDED
Status: DISCONTINUED | OUTPATIENT
Start: 2022-06-21 | End: 2022-06-21 | Stop reason: HOSPADM

## 2022-06-21 RX ORDER — HYDRALAZINE HYDROCHLORIDE 20 MG/ML
5 INJECTION INTRAMUSCULAR; INTRAVENOUS
Status: DISCONTINUED | OUTPATIENT
Start: 2022-06-21 | End: 2022-06-21 | Stop reason: HOSPADM

## 2022-06-21 RX ORDER — EPHEDRINE SULFATE 50 MG/ML
INJECTION, SOLUTION INTRAVENOUS AS NEEDED
Status: DISCONTINUED | OUTPATIENT
Start: 2022-06-21 | End: 2022-06-21 | Stop reason: SURG

## 2022-06-21 RX ORDER — GLYCOPYRROLATE 0.2 MG/ML
INJECTION INTRAMUSCULAR; INTRAVENOUS AS NEEDED
Status: DISCONTINUED | OUTPATIENT
Start: 2022-06-21 | End: 2022-06-21 | Stop reason: SURG

## 2022-06-21 RX ORDER — DEXAMETHASONE SODIUM PHOSPHATE 10 MG/ML
INJECTION INTRAMUSCULAR; INTRAVENOUS AS NEEDED
Status: DISCONTINUED | OUTPATIENT
Start: 2022-06-21 | End: 2022-06-21 | Stop reason: SURG

## 2022-06-21 RX ORDER — HYDROCODONE BITARTRATE AND ACETAMINOPHEN 5; 325 MG/1; MG/1
1 TABLET ORAL EVERY 4 HOURS PRN
Qty: 15 TABLET | Refills: 0 | Status: SHIPPED | OUTPATIENT
Start: 2022-06-21 | End: 2022-08-05

## 2022-06-21 RX ORDER — OXYCODONE AND ACETAMINOPHEN 7.5; 325 MG/1; MG/1
1 TABLET ORAL EVERY 4 HOURS PRN
Status: DISCONTINUED | OUTPATIENT
Start: 2022-06-21 | End: 2022-06-21 | Stop reason: HOSPADM

## 2022-06-21 RX ORDER — ONDANSETRON 2 MG/ML
INJECTION INTRAMUSCULAR; INTRAVENOUS AS NEEDED
Status: DISCONTINUED | OUTPATIENT
Start: 2022-06-21 | End: 2022-06-21 | Stop reason: SURG

## 2022-06-21 RX ORDER — FENTANYL CITRATE 50 UG/ML
50 INJECTION, SOLUTION INTRAMUSCULAR; INTRAVENOUS
Status: DISCONTINUED | OUTPATIENT
Start: 2022-06-21 | End: 2022-06-21 | Stop reason: HOSPADM

## 2022-06-21 RX ORDER — ONDANSETRON 8 MG/1
8 TABLET, ORALLY DISINTEGRATING ORAL EVERY 8 HOURS PRN
Qty: 5 TABLET | Refills: 0 | Status: SHIPPED | OUTPATIENT
Start: 2022-06-21 | End: 2022-07-21

## 2022-06-21 RX ORDER — NALOXONE HCL 0.4 MG/ML
0.2 VIAL (ML) INJECTION AS NEEDED
Status: DISCONTINUED | OUTPATIENT
Start: 2022-06-21 | End: 2022-06-21 | Stop reason: HOSPADM

## 2022-06-21 RX ORDER — HYDROMORPHONE HYDROCHLORIDE 1 MG/ML
0.5 INJECTION, SOLUTION INTRAMUSCULAR; INTRAVENOUS; SUBCUTANEOUS
Status: DISCONTINUED | OUTPATIENT
Start: 2022-06-21 | End: 2022-06-21 | Stop reason: HOSPADM

## 2022-06-21 RX ORDER — PROMETHAZINE HYDROCHLORIDE 25 MG/1
25 SUPPOSITORY RECTAL ONCE AS NEEDED
Status: DISCONTINUED | OUTPATIENT
Start: 2022-06-21 | End: 2022-06-21 | Stop reason: HOSPADM

## 2022-06-21 RX ORDER — EPHEDRINE SULFATE 50 MG/ML
5 INJECTION, SOLUTION INTRAVENOUS ONCE AS NEEDED
Status: DISCONTINUED | OUTPATIENT
Start: 2022-06-21 | End: 2022-06-21 | Stop reason: HOSPADM

## 2022-06-21 RX ORDER — LIDOCAINE HYDROCHLORIDE 10 MG/ML
0.5 INJECTION, SOLUTION EPIDURAL; INFILTRATION; INTRACAUDAL; PERINEURAL ONCE AS NEEDED
Status: DISCONTINUED | OUTPATIENT
Start: 2022-06-21 | End: 2022-06-21 | Stop reason: HOSPADM

## 2022-06-21 RX ORDER — CEPHALEXIN 250 MG/1
250 CAPSULE ORAL 4 TIMES DAILY
Qty: 28 CAPSULE | Refills: 0 | Status: SHIPPED | OUTPATIENT
Start: 2022-06-21 | End: 2022-08-05

## 2022-06-21 RX ORDER — ONDANSETRON 2 MG/ML
4 INJECTION INTRAMUSCULAR; INTRAVENOUS ONCE AS NEEDED
Status: DISCONTINUED | OUTPATIENT
Start: 2022-06-21 | End: 2022-06-21 | Stop reason: HOSPADM

## 2022-06-21 RX ORDER — DIPHENHYDRAMINE HYDROCHLORIDE 50 MG/ML
12.5 INJECTION INTRAMUSCULAR; INTRAVENOUS
Status: DISCONTINUED | OUTPATIENT
Start: 2022-06-21 | End: 2022-06-21 | Stop reason: HOSPADM

## 2022-06-21 RX ORDER — DIPHENHYDRAMINE HCL 25 MG
25 CAPSULE ORAL
Status: DISCONTINUED | OUTPATIENT
Start: 2022-06-21 | End: 2022-06-21 | Stop reason: HOSPADM

## 2022-06-21 RX ORDER — LABETALOL HYDROCHLORIDE 5 MG/ML
5 INJECTION, SOLUTION INTRAVENOUS
Status: DISCONTINUED | OUTPATIENT
Start: 2022-06-21 | End: 2022-06-21 | Stop reason: HOSPADM

## 2022-06-21 RX ORDER — ROCURONIUM BROMIDE 10 MG/ML
INJECTION, SOLUTION INTRAVENOUS AS NEEDED
Status: DISCONTINUED | OUTPATIENT
Start: 2022-06-21 | End: 2022-06-21 | Stop reason: SURG

## 2022-06-21 RX ORDER — HYDROCODONE BITARTRATE AND ACETAMINOPHEN 7.5; 325 MG/1; MG/1
1 TABLET ORAL ONCE AS NEEDED
Status: DISCONTINUED | OUTPATIENT
Start: 2022-06-21 | End: 2022-06-21 | Stop reason: HOSPADM

## 2022-06-21 RX ADMIN — LIDOCAINE HYDROCHLORIDE 100 MG: 20 INJECTION, SOLUTION INFILTRATION; PERINEURAL at 07:34

## 2022-06-21 RX ADMIN — EPHEDRINE SULFATE 10 MG: 50 INJECTION INTRAVENOUS at 08:20

## 2022-06-21 RX ADMIN — GLYCOPYRROLATE 0.2 MG: 0.2 INJECTION INTRAMUSCULAR; INTRAVENOUS at 06:38

## 2022-06-21 RX ADMIN — FENTANYL CITRATE 50 MCG: 50 INJECTION INTRAMUSCULAR; INTRAVENOUS at 09:12

## 2022-06-21 RX ADMIN — PHENYLEPHRINE HYDROCHLORIDE 200 MCG: 10 INJECTION, SOLUTION INTRAVENOUS at 07:46

## 2022-06-21 RX ADMIN — FENTANYL CITRATE 50 MCG: 50 INJECTION INTRAMUSCULAR; INTRAVENOUS at 10:03

## 2022-06-21 RX ADMIN — CEFAZOLIN SODIUM 2 G: 2 INJECTION, SOLUTION INTRAVENOUS at 07:24

## 2022-06-21 RX ADMIN — PHENYLEPHRINE HYDROCHLORIDE 200 MCG: 10 INJECTION, SOLUTION INTRAVENOUS at 07:52

## 2022-06-21 RX ADMIN — PHENYLEPHRINE HYDROCHLORIDE 100 MCG: 10 INJECTION, SOLUTION INTRAVENOUS at 07:42

## 2022-06-21 RX ADMIN — GLYCOPYRROLATE 0.4 MG: 0.2 INJECTION INTRAMUSCULAR; INTRAVENOUS at 10:01

## 2022-06-21 RX ADMIN — SODIUM CHLORIDE, POTASSIUM CHLORIDE, SODIUM LACTATE AND CALCIUM CHLORIDE 9 ML/HR: 600; 310; 30; 20 INJECTION, SOLUTION INTRAVENOUS at 06:19

## 2022-06-21 RX ADMIN — SODIUM CHLORIDE, POTASSIUM CHLORIDE, SODIUM LACTATE AND CALCIUM CHLORIDE: 600; 310; 30; 20 INJECTION, SOLUTION INTRAVENOUS at 08:25

## 2022-06-21 RX ADMIN — NEOSTIGMINE METHYLSULFATE 2 MG: 0.5 INJECTION INTRAVENOUS at 10:01

## 2022-06-21 RX ADMIN — PHENYLEPHRINE HYDROCHLORIDE 100 MCG: 10 INJECTION, SOLUTION INTRAVENOUS at 07:39

## 2022-06-21 RX ADMIN — ONDANSETRON 4 MG: 2 INJECTION INTRAMUSCULAR; INTRAVENOUS at 10:01

## 2022-06-21 RX ADMIN — DEXMEDETOMIDINE 8 MCG: 100 INJECTION, SOLUTION, CONCENTRATE INTRAVENOUS at 09:53

## 2022-06-21 RX ADMIN — DEXMEDETOMIDINE 8 MCG: 100 INJECTION, SOLUTION, CONCENTRATE INTRAVENOUS at 09:43

## 2022-06-21 RX ADMIN — DEXAMETHASONE SODIUM PHOSPHATE 8 MG: 10 INJECTION INTRAMUSCULAR; INTRAVENOUS at 07:50

## 2022-06-21 RX ADMIN — HYDROMORPHONE HYDROCHLORIDE 0.5 MG: 2 INJECTION, SOLUTION INTRAMUSCULAR; INTRAVENOUS; SUBCUTANEOUS at 10:09

## 2022-06-21 RX ADMIN — EPHEDRINE SULFATE 10 MG: 50 INJECTION INTRAVENOUS at 07:52

## 2022-06-21 RX ADMIN — DEXMEDETOMIDINE 8 MCG: 100 INJECTION, SOLUTION, CONCENTRATE INTRAVENOUS at 10:01

## 2022-06-21 RX ADMIN — DEXMEDETOMIDINE 8 MCG: 100 INJECTION, SOLUTION, CONCENTRATE INTRAVENOUS at 09:35

## 2022-06-21 RX ADMIN — EPHEDRINE SULFATE 10 MG: 50 INJECTION INTRAVENOUS at 08:02

## 2022-06-21 RX ADMIN — ROCURONIUM BROMIDE 40 MG: 50 INJECTION INTRAVENOUS at 07:34

## 2022-06-21 RX ADMIN — PROPOFOL 200 MG: 10 INJECTION, EMULSION INTRAVENOUS at 07:34

## 2022-06-21 RX ADMIN — PHENYLEPHRINE HYDROCHLORIDE 0.5 MCG/KG/MIN: 10 INJECTION INTRAVENOUS at 08:24

## 2022-06-21 NOTE — ANESTHESIA PROCEDURE NOTES
Airway  Urgency: elective    Date/Time: 6/21/2022 7:37 AM  Airway not difficult    General Information and Staff    Patient location during procedure: OR  Anesthesiologist: Mike Barron MD  CRNA/CAA: Pepe Oliveira CRNA    Indications and Patient Condition  Indications for airway management: airway protection    Preoxygenated: yes  MILS maintained throughout  Mask difficulty assessment: 2 - vent by mask + OA or adjuvant +/- NMBA    Final Airway Details  Final airway type: endotracheal airway      Successful airway: CEZAR tube  Cuffed: yes   Successful intubation technique: direct laryngoscopy  Facilitating devices/methods: intubating stylet  Endotracheal tube insertion site: oral  Blade: Stokes  Blade size: 2  Cormack-Lehane Classification: grade I - full view of glottis  Placement verified by: chest auscultation and capnometry   Measured from: lips  Number of attempts at approach: 1  Assessment: lips, teeth, and gum same as pre-op and atraumatic intubation

## 2022-06-21 NOTE — NURSING NOTE
Spoke to Dr. Olivares  To see if pts pacemaker needed to be interrogated before discharge.  Per report from CRNA magnet was placed on pacemaker during procedure.     Per Dr. Olivares we do not have to interrogate pacemaker.

## 2022-06-21 NOTE — ANESTHESIA POSTPROCEDURE EVALUATION
Patient: Orion Zepeda    Procedure Summary     Date: 06/21/22 Room / Location:  [facility]  [facility]    Anesthesia Start: 0729 Anesthesia Stop: 1017    Procedure: SEPTOPLASTY, SUBMUCOUS RESECTION INFERIOR TURBINATES, NASAL ENDOSCOPY and ENDOSCOPIC POLYPECTOMY (N/A Nose) Diagnosis:     Surgeons: Nj Peterson MD Provider: Mike Barron MD    Anesthesia Type: general ASA Status: 4          Anesthesia Type: general    Vitals  Vitals Value Taken Time   /84 06/21/22 1101   Temp 37.1 °C (98.7 °F) 06/21/22 1014   Pulse 112 06/21/22 1108   Resp 18 06/21/22 1100   SpO2 90 % 06/21/22 1108   Vitals shown include unvalidated device data.        Post Anesthesia Care and Evaluation    Patient location during evaluation: bedside  Patient participation: complete - patient participated  Level of consciousness: awake  Pain management: adequate    Airway patency: patent  Anesthetic complications: No anesthetic complications  PONV Status: controlled  Cardiovascular status: acceptable  Respiratory status: acceptable  Hydration status: acceptable    Comments: --------------------            06/21/22               1100     --------------------   BP:                  Pulse:               Resp:       18       Temp:                SpO2:               --------------------

## 2022-06-21 NOTE — PERIOPERATIVE NURSING NOTE
Spoke with Reji Sesay ([employer]) regarding patient's pacemaker. Per Reji, pacemaker does not need to be interrogated post-operatively.

## 2022-06-21 NOTE — OP NOTE
[facility] OPERATIVE NOTE  6/21/2022    NAME: Orion Zepeda    YOB: 1945  MRN: [id number]    PRE-OPERATIVE DIAGNOSIS: #1 septal deviation #2 inferior turbinate hypertrophy #3 chronic rhinosinusitis      POST-OPERATIVE DIAGNOSIS: same and #4 nasal polyps    PROCEDURE PERFORMED: #1 septoplasty #2 bilateral submucous resection of the inferior turbinates #3 bilateral endoscopic nasal polypectomy #4 endoscopic resection of left middle turbinate josé bullosa.    SURGEON: Nj Peterson MD    ASSISTANT(S): None    ANESTHESIA: General Anesthesia via Endotracheal Tube    INDICATIONS: The patient is a 76 y.o. male with longstanding nasal obstruction for many years.  Patient has a history of nasal trauma but never had previous surgery.    FINDINGS: Septum was severely deviated to the right obstructing the nasal cavity.  Lower turbinates were hyperplastic with polypoid swelling.  He had very thick mucus in the posterior nasal cavities and sinus spaces with discovery of polyps.    PROCEDURE: Patient was brought to the operating room.  He was given general anesthesia and orally intubated.  Surgical timeout was performed and all patient data was verified.  Patient's nose was prepped and draped for nasal surgery.  Nasal mucosa was vasoconstricted with adrenaline cottonoids.  Septum and lower turbinates infiltrated with 1% lidocaine 1: 100,000 epinephrine.    Nasal endoscopy was performed.  Septum was severely deviated to the right obstructing the nasal airway with a huge spur posteriorly impinging into the middle meatus.  He had thick mucus arising from the middle meati and sphenoethmoid recesses.  The left middle turbinate was also very large with paradoxical curvature.    Left inferior turbinate was incised at the anterior border.  Submucosal dissection was performed.  Excess hypertrophic bone was removed as required and redundant mucosa trimmed.  Turbinate was fractured laterally.    Complete  transfixion incision was made exposing the caudal end of the septum.  Bilateral submucoperichondrial flaps were elevated skeletonizing the cartilaginous and bony septum.  Bony cartilaginous junction was disarticulated.  Significant portions of the bony septum had to be resected all the way back including the perpendicular plate of the ethmoid and vomer.  Very large spur was taken off posteriorly.  Portions of quadrangular cartilage inferiorly posteriorly were resected with preservation of caudal and septal struts.  Inspection of the nasal airway showed much improvement.    Left middle turbinate underwent partial resection with removal of polyps on both sides using endoscopic debrider.  Superior attachments were kept on the left middle turbinate.  Hemostasis was controlled with cautery at the posterior attachment of the left middle turbinate.    Final hemostasis was obtained.  The transfixion incision was closed first with a 4-0 PDS submucosally.  5-0 chromic suture was used bilaterally to close the incisions.  A 4-0 chromic was used for septal columellar fixation.  4-0 PDS was also used in the supradomal area.  4-0 plain gut with Roby needle was used for through and through mattress closure of the septum.  Nasapore was placed into the area where the left middle turbinate was.  Zee splints were placed on each side of the septum.  These were secured anteriorly with a 4-0 nylon stitch.  Additional nasapore packing was placed in the nasal cavity above the Zee nasal airways.    He was extubated in the operating room he was transferred recovery stable condition all sponge and instrument counts were correct estimated blood loss less than 50 cc.    SPECIMENS: None    COMPLICATIONS: NONE    ESTIMATED BLOOD LOSS: 50 cc    Nj Peterson MD  6/21/2022

## 2022-07-08 ENCOUNTER — OFFICE VISIT (OUTPATIENT)
Dept: INTERNAL MEDICINE | Facility: CLINIC | Age: 77
End: 2022-07-08

## 2022-07-08 VITALS
BODY MASS INDEX: 33.18 KG/M2 | TEMPERATURE: 98 F | HEIGHT: 71 IN | SYSTOLIC BLOOD PRESSURE: 130 MMHG | WEIGHT: 237 LBS | HEART RATE: 105 BPM | DIASTOLIC BLOOD PRESSURE: 70 MMHG | OXYGEN SATURATION: 95 %

## 2022-07-08 DIAGNOSIS — S86.111A STRAIN OF RIGHT GASTROCNEMIUS MUSCLE, INITIAL ENCOUNTER: Primary | ICD-10-CM

## 2022-07-08 PROCEDURE — 99213 OFFICE O/P EST LOW 20 MIN: CPT | Performed by: STUDENT IN AN ORGANIZED HEALTH CARE EDUCATION/TRAINING PROGRAM

## 2022-07-08 NOTE — PROGRESS NOTES
"  Bradley Retana D.O.  Internal Medicine  [facility]  [address], [address]  [place] [address]  [phone]      Chief Complaint  Knee Pain (Right, happen on Wednesday heard a pop.)    SUBJECTIVE    History of Present Illness    Orion Zepeda is a 77 y.o. male who presents to the office today as an established patient that last saw me on 5/5/2022.     Here today for acute care visit.    Right knee pain: 2 days ago he was walking into his kitchen and felt a pop on the outside of the right knee and it gave way and he collapsed onto the front of the knee. It started to hurt immediately, on the back side of the leg near the knee. The knee only hurt in the area where he hit on the front.  He has used ice pack and taken ibuprofen as well as some Norco he already has at home from a recent surgical procedure with improvement in pain.   Just from yesterday to today \"It's improved quite a bit\". Right now he doesn't have any pain at all even with walking.     No Known Allergies     Outpatient Medications Marked as Taking for the 7/8/22 encounter (Office Visit) with Bradley Retana, DO   Medication Sig Dispense Refill   • albuterol sulfate  (90 Base) MCG/ACT inhaler Inhale 2 puffs Every 4 (Four) Hours As Needed for Wheezing or Shortness of Air. 18 g 1   • atorvastatin (Lipitor) 40 MG tablet Take 1 tablet by mouth Daily. 90 tablet 1   • cephalexin (Keflex) 250 MG capsule Take 1 capsule by mouth 4 (Four) Times a Day. 28 capsule 0   • HYDROcodone-acetaminophen (NORCO) 5-325 MG per tablet Take 1 tablet by mouth Every 4 (Four) Hours As Needed for Pain. 15 tablet 0   • Loratadine 10 MG capsule Take 10 mg by mouth Daily.     • ondansetron ODT (Zofran ODT) 8 MG disintegrating tablet Place 1 tablet on the tongue Every 8 (Eight) Hours As Needed for Nausea or Vomiting. 5 tablet 0        Past Medical History:   Diagnosis Date   • Arthritis    • Complete heart block (HCC) 2019   • Dislocated shoulder, " "right, subsequent encounter 2011   • History of staph infection     AFTER SHOULDER SURGERY IN THE 70'S   • Hyperlipidemia    • Nasal septal deviation    • Nasal turbinate hypertrophy    • SOB (shortness of breath) on exertion    • Syncope 2018       OBJECTIVE    Vital Signs:   /70   Pulse 105   Temp 98 °F (36.7 °C)   Ht 180.3 cm (71\")   Wt 108 kg (237 lb)   SpO2 95%   BMI 33.05 kg/m²     Physical Exam  Vitals reviewed.   Constitutional:       General: He is not in acute distress.     Appearance: He is obese. He is not ill-appearing.   HENT:      Head: Atraumatic.   Eyes:      General: No scleral icterus.  Pulmonary:      Effort: Pulmonary effort is normal. No respiratory distress.   Musculoskeletal:      Right knee: No swelling, deformity, effusion, erythema or bony tenderness. Normal range of motion. No tenderness.      Instability Tests: Anterior Lachman test negative.      Left knee: Normal.      Right lower leg: No swelling or tenderness.      Left lower leg: No swelling or tenderness.      Comments: Ambulating with walker  Right knee: negative apley compression test and negative valgus and varus test   Skin:     Coloration: Skin is not jaundiced.   Neurological:      Mental Status: He is alert.   Psychiatric:         Mood and Affect: Mood normal.         Behavior: Behavior normal.         Thought Content: Thought content normal.                             ASSESSMENT & PLAN     Diagnoses and all orders for this visit:    1. Strain of right gastrocnemius muscle, initial encounter (Primary)  -2 day duration of pain on the posterior calf near the knee after walking and feeling a popping sensation  -there may have been a small amount of swelling around the back of the leg at that time but at this time both pain and swelling have resolved  -I have low suspicion for DVT   -knee exam is normal, there is no tenderness of the knee joint and no knee pain with provocation testing   -there is no tenderness to " palpation in the area of the gastrocnemius but this is the area where he reports pain has occured  -symptoms consistent with right gastrocnemius muscle strain  -continue measures that he is already doing and getting improvement with: ice, elevation, gradually reintroduce activities, compression   -contact office if not continuing to improve in 3-5 days and report to urgent care or ER if knee suddenly develops redness, warmth or if the calf has swelling as this could indicate DVT          The following social determinates of health impact the patient's medical decision making: No social determinates of health were factored in to today's visit.     Follow Up  No follow-ups on file.    Patient/family had no further questions at this time and verbalized understanding of the plan discussed today.

## 2022-08-05 ENCOUNTER — OFFICE VISIT (OUTPATIENT)
Dept: INTERNAL MEDICINE | Facility: CLINIC | Age: 77
End: 2022-08-05

## 2022-08-05 VITALS
SYSTOLIC BLOOD PRESSURE: 110 MMHG | WEIGHT: 239 LBS | HEIGHT: 71 IN | DIASTOLIC BLOOD PRESSURE: 74 MMHG | OXYGEN SATURATION: 95 % | BODY MASS INDEX: 33.46 KG/M2 | TEMPERATURE: 97.7 F | HEART RATE: 104 BPM

## 2022-08-05 DIAGNOSIS — E78.2 MIXED HYPERLIPIDEMIA: Primary | ICD-10-CM

## 2022-08-05 PROCEDURE — 99214 OFFICE O/P EST MOD 30 MIN: CPT | Performed by: STUDENT IN AN ORGANIZED HEALTH CARE EDUCATION/TRAINING PROGRAM

## 2022-08-05 RX ORDER — ATORVASTATIN CALCIUM 40 MG/1
40 TABLET, FILM COATED ORAL DAILY
Qty: 90 TABLET | Refills: 1 | Status: SHIPPED | OUTPATIENT
Start: 2022-08-05 | End: 2022-11-08 | Stop reason: SDUPTHER

## 2022-08-05 NOTE — PROGRESS NOTES
"  Bradley Retana D.O.  Internal Medicine  [facility]  [address], [address]  [place] [address]  [phone]      Chief Complaint  Hyperlipidemia    SUBJECTIVE    History of Present Illness    Orion Zepeda is a 77 y.o. male who presents to the office today as an established patient that last saw me on 7/8/2022.     Here today for follow up of HLD. In May 2022 we started atorvastatin 40 mg daily for primary prevention. He has had no side effects from starting this medication.     No Known Allergies     Outpatient Medications Marked as Taking for the 8/5/22 encounter (Office Visit) with Bradley Retana, DO   Medication Sig Dispense Refill   • albuterol sulfate  (90 Base) MCG/ACT inhaler Inhale 2 puffs Every 4 (Four) Hours As Needed for Wheezing or Shortness of Air. 18 g 1   • atorvastatin (Lipitor) 40 MG tablet Take 1 tablet by mouth Daily. 90 tablet 1   • [DISCONTINUED] atorvastatin (Lipitor) 40 MG tablet Take 1 tablet by mouth Daily. 90 tablet 1        Past Medical History:   Diagnosis Date   • Arthritis    • Complete heart block (HCC) 2019   • Dislocated shoulder, right, subsequent encounter 2011   • History of staph infection     AFTER SHOULDER SURGERY IN THE 70'S   • Hyperlipidemia    • Nasal septal deviation    • Nasal turbinate hypertrophy    • SOB (shortness of breath) on exertion    • Syncope 2018       OBJECTIVE    Vital Signs:   /74   Pulse 104   Temp 97.7 °F (36.5 °C)   Ht 180.3 cm (71\")   Wt 108 kg (239 lb)   SpO2 95%   BMI 33.33 kg/m²     Physical Exam  Vitals reviewed.   Constitutional:       General: He is not in acute distress.     Appearance: Normal appearance. He is not ill-appearing.   Eyes:      General: No scleral icterus.  Pulmonary:      Effort: Pulmonary effort is normal. No respiratory distress.   Neurological:      Mental Status: He is alert.   Psychiatric:         Mood and Affect: Mood normal.         Behavior: Behavior normal.         Thought " Content: Thought content normal.                             ASSESSMENT & PLAN     Diagnoses and all orders for this visit:    1. Mixed hyperlipidemia (Primary)  Lab Results   Component Value Date    CHLPL 251 (H) 03/18/2022    TRIG 180 (H) 03/18/2022    HDL 40 03/18/2022     (H) 03/18/2022     -uncontrolled  -pt started on atorvastatin 40 mg daily May 2022 due to high ASCVD risk  -no side effects from atorvastatin treatment  -will recheck fasting lipid profile to ensure 30-50% decrease in LDL  -continue current regimen at this time   -     Lipid Panel With LDL/HDL Ratio; Future  -     atorvastatin (Lipitor) 40 MG tablet; Take 1 tablet by mouth Daily.  Dispense: 90 tablet; Refill: 1            The following social determinates of health impact the patient's medical decision making: No social determinates of health were factored in to today's visit.     Follow Up  Return in about 6 months (around 2/5/2023) for Recheck.    Patient/family had no further questions at this time and verbalized understanding of the plan discussed today.

## 2022-09-29 ENCOUNTER — CLINICAL SUPPORT NO REQUIREMENTS (OUTPATIENT)
Dept: CARDIOLOGY | Facility: CLINIC | Age: 77
End: 2022-09-29

## 2022-09-29 ENCOUNTER — OFFICE VISIT (OUTPATIENT)
Dept: CARDIOLOGY | Facility: CLINIC | Age: 77
End: 2022-09-29

## 2022-09-29 VITALS
DIASTOLIC BLOOD PRESSURE: 88 MMHG | HEART RATE: 102 BPM | WEIGHT: 236 LBS | BODY MASS INDEX: 33.04 KG/M2 | SYSTOLIC BLOOD PRESSURE: 130 MMHG | HEIGHT: 71 IN

## 2022-09-29 DIAGNOSIS — R55 SYNCOPE, UNSPECIFIED SYNCOPE TYPE: ICD-10-CM

## 2022-09-29 DIAGNOSIS — I44.2 THIRD DEGREE HEART BLOCK: Primary | ICD-10-CM

## 2022-09-29 DIAGNOSIS — R06.02 SHORTNESS OF BREATH: ICD-10-CM

## 2022-09-29 PROCEDURE — 93280 PM DEVICE PROGR EVAL DUAL: CPT | Performed by: INTERNAL MEDICINE

## 2022-09-29 PROCEDURE — 93000 ELECTROCARDIOGRAM COMPLETE: CPT

## 2022-09-29 PROCEDURE — 99213 OFFICE O/P EST LOW 20 MIN: CPT

## 2022-09-29 NOTE — PROGRESS NOTES
Date of Office Visit: 2022  Encounter Provider: STEVE Frankel  Place of Service: [facility]ARDI[facility]  Patient Name: Orion Zepeda  :1945    Chief Complaint   Patient presents with   • Cardiomyopathy     6 month f/u   • third degree AV block   • Pacemaker Check   :     HPI: Orion Zepeda is a 77 y.o. male who is followed by Dr. Gordon.  He has a history of dyspnea and complete heart block---s/p DC PPM (2019).     He saw Dr. Gordon in March. He was doing okay at that time but still complaining of shortness of breath that worsened with activity.  He was pacing 99%. Echo was ordered at that time which showed LVEF 45-49%.  He was advised to follow up in 6 months.                       He presents today for follow up appt.     He says that overall he is doing pretty well.     He does continue to complain of shortness of breath that worsens with any type of activity.  He denies any other symptoms associated with the shortness of breath.  No history of lung disease, but does have inhaler.     He also complains of occasional dizziness that occurs after standing for a few minutes. This has been ongoing. He denies any falls associated with this.     Echo in March showed LVEF of 45-49%.     He denies any chest pain, fatigue, PND, orthopnea, or edema.     Normal device testing and function in office today. AP:9%, RV: 99%. No events on device.     Past Medical History:   Diagnosis Date   • Arthritis    • Complete heart block (HCC)    • Dislocated shoulder, right, subsequent encounter    • History of staph infection     AFTER SHOULDER SURGERY IN THE 70'S   • Hyperlipidemia    • Nasal septal deviation    • Nasal turbinate hypertrophy    • SOB (shortness of breath) on exertion    • Syncope        Past Surgical History:   Procedure Laterality Date   • CARDIAC ELECTROPHYSIOLOGY PROCEDURE N/A 2019    Procedure: Temporary Pacemaker;  Surgeon: Mervin  MD Vonnie;  Location[facility];  Service: Cardiology   • CARDIAC ELECTROPHYSIOLOGY PROCEDURE N/A 05/06/2019    Procedure: Pacemaker DC new  [place];  Surgeon: Bryn Gordon MD;  [facility];  Service: Cardiovascular   • CARDIAC ELECTROPHYSIOLOGY PROCEDURE N/A 05/05/2019    Procedure: Temporary Pacemaker- Reposition;  Surgeon: Vonnie Jeffries MD;  [facility];  Service: Cardiology   • DIAGNOSTIC LAPAROSCOPY N/A 09/15/2019    Procedure: DIAGNOSTIC LAPAROSCOPY;  Surgeon: Sonya Sheikh MD;  Location: [facility] MAIN OR;  Service: General   • INGUINAL HERNIA REPAIR N/A 09/15/2019    Procedure: left INGUINAL HERNIA REPAIR LAPAROSCOPIC, REDUCTION OF SMALL BOWEL;  Surgeon: Sonya Sheikh MD;  Location: [facility] MAIN OR;  Service: General   • SEPTOPLASTY, RESECTION INFERIOR TURBINATES N/A 6/21/2022    Procedure: SEPTOPLASTY, SUBMUCOUS RESECTION INFERIOR TURBINATES, NASAL ENDOSCOPY and ENDOSCOPIC POLYPECTOMY;  Surgeon: Nj Peterson MD;  Location: [facility];  Service: ENT;  Laterality: N/A;   • SHOULDER SURGERY Left     in his 20s; ligamentous surgery       Social History     Socioeconomic History   • Marital status:    • Number of children: 2   Tobacco Use   • Smoking status: Former Smoker     Packs/day: 1.00     Years: 50.00     Pack years: 50.00     Quit date: 2019     Years since quitting: 3.7   • Smokeless tobacco: Never Used   Vaping Use   • Vaping Use: Never used   Substance and Sexual Activity   • Alcohol use: Never   • Drug use: Yes     Types: Marijuana     Comment: USES 3-4 TIMES WEEK   • Sexual activity: Defer       Family History   Problem Relation Age of Onset   • Stroke Mother    • Tuberculosis Mother    • Lung cancer Father    • Rheumatic fever Brother    • Malig Hyperthermia Neg Hx        Review of Systems   Constitutional: Negative for chills, fever and malaise/fatigue.   Cardiovascular: Positive for  "dyspnea on exertion. Negative for chest pain, leg swelling, near-syncope, orthopnea, palpitations, paroxysmal nocturnal dyspnea and syncope.   Respiratory: Positive for shortness of breath. Negative for cough.    Hematologic/Lymphatic: Negative.    Musculoskeletal: Negative for joint pain, joint swelling and myalgias.   Gastrointestinal: Negative for abdominal pain, diarrhea, melena, nausea and vomiting.   Genitourinary: Negative for frequency and hematuria.   Neurological: Positive for dizziness. Negative for light-headedness, numbness, paresthesias and seizures.   Allergic/Immunologic: Negative.    All other systems reviewed and are negative.      No Known Allergies      Current Outpatient Medications:   •  albuterol sulfate  (90 Base) MCG/ACT inhaler, Inhale 2 puffs Every 4 (Four) Hours As Needed for Wheezing or Shortness of Air., Disp: 18 g, Rfl: 1  •  atorvastatin (Lipitor) 40 MG tablet, Take 1 tablet by mouth Daily., Disp: 90 tablet, Rfl: 1      Objective:     Vitals:    09/29/22 1000   BP: 130/88   Pulse: 102   Weight: 107 kg (236 lb)   Height: 180.3 cm (71\")     Body mass index is 32.92 kg/m².    PHYSICAL EXAM:    Vitals Reviewed.   General Appearance: No acute distress, well developed and well nourished.   Eyes: Conjunctiva and lids: No erythema, swelling, or discharge. Sclera non-icteric.   HENT: Atraumatic, normocephalic. External eyes, ears, and nose normal.   Respiratory: No signs of respiratory distress. Respiration rhythm and depth normal.   Clear to auscultation. No rales, crackles, rhonchi, or wheezing auscultated.   Cardiovascular:  Heart Rate and Rhythm: Normal, Heart Sounds: Normal S1 and S2. No S3 or S4 noted.  Gastrointestinal:  Abdomen soft, non-distended, non-tender.   Musculoskeletal: Normal movement of extremities  Skin: Warm and dry.   Psychiatric: Patient alert and oriented to person, place, and time. Speech and behavior appropriate. Normal mood and affect.       ECG 12 " Lead    Date/Time: 9/29/2022 3:04 PM  Performed by: Adonis Rojo APRN  Authorized by: Adonis Rojo APRN   Comparison: compared with previous ECG   Similar to previous ECG  Rhythm: paced  Rhythm comments:   BPM: 102                Assessment:       Diagnosis Plan   1. Third degree heart block (HCC)     2. Syncope, unspecified syncope type     3. Shortness of breath            Plan:   1-2. CHB---s/p DC PPM (2019)--- normal device testing and function in office today. AP:9%, RV:99%. Echo in March was normal, no concern for PMCM.       3. Shortness of breath-- I do not see any cardiac reason as to why he is short of breath. I discussed with Dr. Gordon who agrees. I offered referral to pulmonary and/or recommended that he discuss with his PCP. He says he will mention to his PCP.     4. Dizziness- - he BP is normal. Pacemaker is functioning normal. He is not on any medications that would cause dizziness. It does sound like possible vertigo. I recommended looking at other etiologies as to why he is having occasional dizziness.            He will follow up with Dr. Gordon in 6 months.               As always, it has been a pleasure to participate in your patient's care.      Sincerely,         STEVE Frankel

## 2022-11-08 DIAGNOSIS — E78.2 MIXED HYPERLIPIDEMIA: ICD-10-CM

## 2022-11-08 RX ORDER — ATORVASTATIN CALCIUM 40 MG/1
40 TABLET, FILM COATED ORAL DAILY
Qty: 90 TABLET | Refills: 1 | Status: SHIPPED | OUTPATIENT
Start: 2022-11-08

## 2023-02-08 ENCOUNTER — OFFICE VISIT (OUTPATIENT)
Dept: INTERNAL MEDICINE | Facility: CLINIC | Age: 78
End: 2023-02-08
Payer: MEDICARE

## 2023-02-08 VITALS
HEIGHT: 71 IN | OXYGEN SATURATION: 97 % | WEIGHT: 237 LBS | BODY MASS INDEX: 33.18 KG/M2 | HEART RATE: 116 BPM | DIASTOLIC BLOOD PRESSURE: 80 MMHG | SYSTOLIC BLOOD PRESSURE: 120 MMHG

## 2023-02-08 DIAGNOSIS — J32.9 CHRONIC SINUSITIS, UNSPECIFIED LOCATION: ICD-10-CM

## 2023-02-08 DIAGNOSIS — R73.9 HYPERGLYCEMIA: ICD-10-CM

## 2023-02-08 DIAGNOSIS — R93.1 DECREASED CARDIAC EJECTION FRACTION: Primary | ICD-10-CM

## 2023-02-08 DIAGNOSIS — R06.02 SHORTNESS OF BREATH: ICD-10-CM

## 2023-02-08 LAB
ALBUMIN SERPL-MCNC: 4.3 G/DL (ref 3.5–5.2)
ALBUMIN/GLOB SERPL: 1.5 G/DL
ALP SERPL-CCNC: 73 U/L (ref 39–117)
ALT SERPL-CCNC: 24 U/L (ref 1–41)
AST SERPL-CCNC: 20 U/L (ref 1–40)
BASOPHILS # BLD AUTO: 0.07 10*3/MM3 (ref 0–0.2)
BASOPHILS NFR BLD AUTO: 0.8 % (ref 0–1.5)
BILIRUB SERPL-MCNC: 1.6 MG/DL (ref 0–1.2)
BUN SERPL-MCNC: 18 MG/DL (ref 8–23)
BUN/CREAT SERPL: 14.6 (ref 7–25)
CALCIUM SERPL-MCNC: 10.1 MG/DL (ref 8.6–10.5)
CHLORIDE SERPL-SCNC: 104 MMOL/L (ref 98–107)
CO2 SERPL-SCNC: 32.6 MMOL/L (ref 22–29)
CREAT SERPL-MCNC: 1.23 MG/DL (ref 0.76–1.27)
EGFRCR SERPLBLD CKD-EPI 2021: 60.8 ML/MIN/1.73
EOSINOPHIL # BLD AUTO: 0.27 10*3/MM3 (ref 0–0.4)
EOSINOPHIL NFR BLD AUTO: 3.1 % (ref 0.3–6.2)
ERYTHROCYTE [DISTWIDTH] IN BLOOD BY AUTOMATED COUNT: 12 % (ref 12.3–15.4)
GLOBULIN SER CALC-MCNC: 2.8 GM/DL
GLUCOSE SERPL-MCNC: 108 MG/DL (ref 65–99)
HBA1C MFR BLD: 5.8 % (ref 4.8–5.6)
HCT VFR BLD AUTO: 46 % (ref 37.5–51)
HGB BLD-MCNC: 15.8 G/DL (ref 13–17.7)
IMM GRANULOCYTES # BLD AUTO: 0.03 10*3/MM3 (ref 0–0.05)
IMM GRANULOCYTES NFR BLD AUTO: 0.3 % (ref 0–0.5)
LYMPHOCYTES # BLD AUTO: 2.1 10*3/MM3 (ref 0.7–3.1)
LYMPHOCYTES NFR BLD AUTO: 23.9 % (ref 19.6–45.3)
MCH RBC QN AUTO: 31.5 PG (ref 26.6–33)
MCHC RBC AUTO-ENTMCNC: 34.3 G/DL (ref 31.5–35.7)
MCV RBC AUTO: 91.8 FL (ref 79–97)
MONOCYTES # BLD AUTO: 0.52 10*3/MM3 (ref 0.1–0.9)
MONOCYTES NFR BLD AUTO: 5.9 % (ref 5–12)
NEUTROPHILS # BLD AUTO: 5.81 10*3/MM3 (ref 1.7–7)
NEUTROPHILS NFR BLD AUTO: 66 % (ref 42.7–76)
NRBC BLD AUTO-RTO: 0 /100 WBC (ref 0–0.2)
PLATELET # BLD AUTO: 262 10*3/MM3 (ref 140–450)
POTASSIUM SERPL-SCNC: 4.2 MMOL/L (ref 3.5–5.2)
PROT SERPL-MCNC: 7.1 G/DL (ref 6–8.5)
RBC # BLD AUTO: 5.01 10*6/MM3 (ref 4.14–5.8)
SODIUM SERPL-SCNC: 145 MMOL/L (ref 136–145)
WBC # BLD AUTO: 8.8 10*3/MM3 (ref 3.4–10.8)

## 2023-02-08 PROCEDURE — 99214 OFFICE O/P EST MOD 30 MIN: CPT | Performed by: STUDENT IN AN ORGANIZED HEALTH CARE EDUCATION/TRAINING PROGRAM

## 2023-02-08 NOTE — PROGRESS NOTES
"  Bradley Retana D.O.  Internal Medicine  [facility]  [address], [address]  [place] [address]  [phone]      Chief Complaint  Follow-up    SUBJECTIVE    History of Present Illness    Orion Zepeda is a 77 y.o. male who presents to the office today as an established patient that last saw me on 8/5/2022.     Chronic sinusitis: follows with ENT, s/p septoplasty and resection of inferior nasal turbinates. States breathing through the nose feels \"fine\".     Shortness of air still persists for him, can happen at rest but worse with activity. States walking 50 feet will make him short of air. No shortness of air while lying flat at night, though he does feel that he feels like he can never get comfortable to sleep. No lower leg swelling. No chest pain. Does have a little cough in the morning that he feels to clear his throat but that is all.     No Known Allergies     Outpatient Medications Marked as Taking for the 2/8/23 encounter (Office Visit) with Bradley Retana,    Medication Sig Dispense Refill   • atorvastatin (Lipitor) 40 MG tablet Take 1 tablet by mouth Daily. 90 tablet 1        Past Medical History:   Diagnosis Date   • Arthritis    • Complete heart block (HCC) 2019   • Dislocated shoulder, right, subsequent encounter 2011   • History of staph infection     AFTER SHOULDER SURGERY IN THE 70'S   • Hyperlipidemia    • Nasal septal deviation    • Nasal turbinate hypertrophy    • SOB (shortness of breath) on exertion    • Syncope 2018       OBJECTIVE    Vital Signs:   /80   Pulse 116   Ht 180.3 cm (71\")   Wt 108 kg (237 lb)   SpO2 97%   BMI 33.05 kg/m²     Physical Exam  Vitals reviewed.   Constitutional:       General: He is not in acute distress.     Appearance: Normal appearance. He is obese. He is not ill-appearing.   HENT:      Head: Atraumatic.   Eyes:      General: No scleral icterus.  Cardiovascular:      Rate and Rhythm: Normal rate and regular rhythm.      Heart " sounds: Normal heart sounds. No murmur heard.  Pulmonary:      Effort: Pulmonary effort is normal. No respiratory distress.      Breath sounds: Wheezing (soft RUL) present.   Musculoskeletal:      Right lower leg: No edema.      Left lower leg: No edema.   Skin:     Coloration: Skin is not jaundiced.   Neurological:      Mental Status: He is alert.   Psychiatric:         Mood and Affect: Mood normal.         Behavior: Behavior normal.         Thought Content: Thought content normal.                             ASSESSMENT & PLAN     Diagnoses and all orders for this visit:    1. Decreased cardiac ejection fraction (Primary)  2. Shortness of breath  -pt continues to endorse shortness of air with activity primarily and sometimes at rest, even as few as 50 feet of walking  -TTE 3/2022 demonstrated EF of 45.1%, with left ventricular systolic function mildly decreased.  There was mild septal wall hypokinesis as well.  Left ventricular systolic function with grade 1 impaired relaxation.  Mildly calcified sclerotic aortic valve with no significant stenosis or regurgitation was noticed.  Chest x-ray from 4/2022 demonstrated clear lungs with mild cardiomegaly.  -Patient also underwent PFT 4/2022 which was normal.  -On exam he is slightly tachycardic which is his baseline, normotensive, satting 97% on room air.  Weight is stable at 237 pounds.  He is euvolemic.  Lungs auscultation reveals slight wheezing right upper lobe.  -Given his symptoms, cardiomegaly on chest x-ray, and midrange EF of 45% with echocardiographic evidence of systolic function decreased, I am concerned about heart failure.  I will obtain some other work-up including CBC and CMP but I would also like to repeat his TTE to assess for worsening valvular function or worsening EF.  Given that his other work-ups have been negative and his nasal breathing has improved with septoplasty and inferior nasal turbinate resection, I have high suspicion of CHF. If this is  "again confirmed with TTE, he will need to be started on goal directed medical therapy.   -he already follows with cardiology and I will make them aware of my findings once they are resulted   -     Adult Transthoracic Echo Complete W/ Cont if Necessary Per Protocol; Future  -     CBC w AUTO Differential  -     Comprehensive metabolic panel    3. Hyperglycemia  - check    Hemoglobin A1c today    4. Chronic sinusitis, unspecified location  -follows with ENT, s/p septoplasty and resection of inferior nasal turbinates. States breathing through the nose feels \"fine\".   -reviewed most recent progress note from ENT          The following social determinates of health impact the patient's medical decision making: No social determinates of health were factored in to today's visit.     Follow Up  No follow-ups on file.    Patient/family had no further questions at this time and verbalized understanding of the plan discussed today.   "

## 2023-03-07 ENCOUNTER — TRANSCRIBE ORDERS (OUTPATIENT)
Dept: CARDIOLOGY | Facility: CLINIC | Age: 78
End: 2023-03-07
Payer: MEDICARE

## 2023-03-07 ENCOUNTER — LAB (OUTPATIENT)
Dept: LAB | Facility: HOSPITAL | Age: 78
End: 2023-03-07
Payer: MEDICARE

## 2023-03-07 ENCOUNTER — HOSPITAL ENCOUNTER (OUTPATIENT)
Dept: CARDIOLOGY | Facility: HOSPITAL | Age: 78
Discharge: HOME | End: 2023-03-07
Payer: MEDICARE

## 2023-03-07 VITALS
OXYGEN SATURATION: 98 % | WEIGHT: 237 LBS | HEIGHT: 71 IN | DIASTOLIC BLOOD PRESSURE: 80 MMHG | HEART RATE: 100 BPM | BODY MASS INDEX: 33.18 KG/M2 | SYSTOLIC BLOOD PRESSURE: 140 MMHG

## 2023-03-07 DIAGNOSIS — Z13.6 SCREENING FOR ISCHEMIC HEART DISEASE: ICD-10-CM

## 2023-03-07 DIAGNOSIS — Z01.810 PRE-OPERATIVE CARDIOVASCULAR EXAMINATION: ICD-10-CM

## 2023-03-07 DIAGNOSIS — R06.02 SHORTNESS OF BREATH: ICD-10-CM

## 2023-03-07 DIAGNOSIS — Z01.810 PRE-OPERATIVE CARDIOVASCULAR EXAMINATION: Primary | ICD-10-CM

## 2023-03-07 DIAGNOSIS — R06.02 SHORTNESS OF BREATH: Primary | ICD-10-CM

## 2023-03-07 LAB
ANION GAP SERPL CALCULATED.3IONS-SCNC: 12.2 MMOL/L (ref 5–15)
AORTIC ARCH: 2.9 CM
ASCENDING AORTA: 3.7 CM
BASOPHILS # BLD AUTO: 0.04 10*3/MM3 (ref 0–0.2)
BASOPHILS NFR BLD AUTO: 0.4 % (ref 0–1.5)
BH CV ECHO MEAS - ACS: 1.86 CM
BH CV ECHO MEAS - AO MAX PG: 8.9 MMHG
BH CV ECHO MEAS - AO MEAN PG: 5.2 MMHG
BH CV ECHO MEAS - AO ROOT DIAM: 4 CM
BH CV ECHO MEAS - AO V2 MAX: 149.4 CM/SEC
BH CV ECHO MEAS - AO V2 VTI: 24 CM
BH CV ECHO MEAS - AVA(I,D): 2.6 CM2
BH CV ECHO MEAS - EDV(CUBED): 116 ML
BH CV ECHO MEAS - EDV(MOD-SP2): 207 ML
BH CV ECHO MEAS - EDV(MOD-SP4): 223 ML
BH CV ECHO MEAS - EF(MOD-BP): 16.8 %
BH CV ECHO MEAS - EF(MOD-SP2): 14 %
BH CV ECHO MEAS - EF(MOD-SP4): 17 %
BH CV ECHO MEAS - ESV(CUBED): 96.2 ML
BH CV ECHO MEAS - ESV(MOD-SP2): 178 ML
BH CV ECHO MEAS - ESV(MOD-SP4): 185 ML
BH CV ECHO MEAS - FS: 6 %
BH CV ECHO MEAS - IVS/LVPW: 0.91 CM
BH CV ECHO MEAS - IVSD: 1.27 CM
BH CV ECHO MEAS - LAT PEAK E' VEL: 10.6 CM/SEC
BH CV ECHO MEAS - LV DIASTOLIC VOL/BSA (35-75): 98.4 CM2
BH CV ECHO MEAS - LV MASS(C)D: 260.8 GRAMS
BH CV ECHO MEAS - LV MAX PG: 5.3 MMHG
BH CV ECHO MEAS - LV MEAN PG: 3.1 MMHG
BH CV ECHO MEAS - LV SYSTOLIC VOL/BSA (12-30): 81.6 CM2
BH CV ECHO MEAS - LV V1 MAX: 115.3 CM/SEC
BH CV ECHO MEAS - LV V1 VTI: 18.1 CM
BH CV ECHO MEAS - LVIDD: 4.9 CM
BH CV ECHO MEAS - LVIDS: 4.6 CM
BH CV ECHO MEAS - LVOT AREA: 3.5 CM2
BH CV ECHO MEAS - LVOT DIAM: 2.1 CM
BH CV ECHO MEAS - LVPWD: 1.39 CM
BH CV ECHO MEAS - MED PEAK E' VEL: 10.9 CM/SEC
BH CV ECHO MEAS - MR MAX PG: 15 MMHG
BH CV ECHO MEAS - MR MAX VEL: 193.7 CM/SEC
BH CV ECHO MEAS - MV DEC SLOPE: 1008 CM/SEC2
BH CV ECHO MEAS - MV DEC TIME: 0.11 MSEC
BH CV ECHO MEAS - MV E MAX VEL: 109 CM/SEC
BH CV ECHO MEAS - MV MAX PG: 9.6 MMHG
BH CV ECHO MEAS - MV MEAN PG: 3.5 MMHG
BH CV ECHO MEAS - MV P1/2T: 45.5 MSEC
BH CV ECHO MEAS - MV V2 VTI: 25.2 CM
BH CV ECHO MEAS - MVA(P1/2T): 4.8 CM2
BH CV ECHO MEAS - MVA(VTI): 2.49 CM2
BH CV ECHO MEAS - PA ACC TIME: 0.12 SEC
BH CV ECHO MEAS - PA PR(ACCEL): 25.5 MMHG
BH CV ECHO MEAS - PA V2 MAX: 107.5 CM/SEC
BH CV ECHO MEAS - QP/QS: 0.82
BH CV ECHO MEAS - RV MAX PG: 3.1 MMHG
BH CV ECHO MEAS - RV V1 MAX: 87.4 CM/SEC
BH CV ECHO MEAS - RV V1 VTI: 15.9 CM
BH CV ECHO MEAS - RVOT DIAM: 2.03 CM
BH CV ECHO MEAS - SI(MOD-SP2): 12.8 ML/M2
BH CV ECHO MEAS - SI(MOD-SP4): 16.8 ML/M2
BH CV ECHO MEAS - SV(LVOT): 62.8 ML
BH CV ECHO MEAS - SV(MOD-SP2): 29 ML
BH CV ECHO MEAS - SV(MOD-SP4): 38 ML
BH CV ECHO MEAS - SV(RVOT): 51.3 ML
BH CV ECHO MEAS - TAPSE (>1.6): 1.46 CM
BH CV ECHO MEASUREMENTS AVERAGE E/E' RATIO: 10.14
BH CV XLRA - RV BASE: 3.9 CM
BH CV XLRA - RV LENGTH: 7.7 CM
BH CV XLRA - RV MID: 2.9 CM
BH CV XLRA - TDI S': 11.1 CM/SEC
BUN SERPL-MCNC: 23 MG/DL (ref 8–23)
BUN/CREAT SERPL: 17.4 (ref 7–25)
CALCIUM SPEC-SCNC: 9.7 MG/DL (ref 8.6–10.5)
CHLORIDE SERPL-SCNC: 104 MMOL/L (ref 98–107)
CO2 SERPL-SCNC: 25.8 MMOL/L (ref 22–29)
CREAT SERPL-MCNC: 1.32 MG/DL (ref 0.76–1.27)
DEPRECATED RDW RBC AUTO: 41.5 FL (ref 37–54)
EGFRCR SERPLBLD CKD-EPI 2021: 55.9 ML/MIN/1.73
EOSINOPHIL # BLD AUTO: 0.19 10*3/MM3 (ref 0–0.4)
EOSINOPHIL NFR BLD AUTO: 1.8 % (ref 0.3–6.2)
ERYTHROCYTE [DISTWIDTH] IN BLOOD BY AUTOMATED COUNT: 12.3 % (ref 12.3–15.4)
GLUCOSE SERPL-MCNC: 97 MG/DL (ref 65–99)
HCT VFR BLD AUTO: 45.7 % (ref 37.5–51)
HGB BLD-MCNC: 15.5 G/DL (ref 13–17.7)
IMM GRANULOCYTES # BLD AUTO: 0.04 10*3/MM3 (ref 0–0.05)
IMM GRANULOCYTES NFR BLD AUTO: 0.4 % (ref 0–0.5)
LEFT ATRIUM VOLUME INDEX: 24 ML/M2
LYMPHOCYTES # BLD AUTO: 2.38 10*3/MM3 (ref 0.7–3.1)
LYMPHOCYTES NFR BLD AUTO: 22.9 % (ref 19.6–45.3)
MAXIMAL PREDICTED HEART RATE: 144 BPM
MCH RBC QN AUTO: 31.6 PG (ref 26.6–33)
MCHC RBC AUTO-ENTMCNC: 33.9 G/DL (ref 31.5–35.7)
MCV RBC AUTO: 93.3 FL (ref 79–97)
MONOCYTES # BLD AUTO: 0.7 10*3/MM3 (ref 0.1–0.9)
MONOCYTES NFR BLD AUTO: 6.7 % (ref 5–12)
NEUTROPHILS NFR BLD AUTO: 67.8 % (ref 42.7–76)
NEUTROPHILS NFR BLD AUTO: 7.04 10*3/MM3 (ref 1.7–7)
NRBC BLD AUTO-RTO: 0 /100 WBC (ref 0–0.2)
PLATELET # BLD AUTO: 273 10*3/MM3 (ref 140–450)
PMV BLD AUTO: 9.8 FL (ref 6–12)
POTASSIUM SERPL-SCNC: 3.9 MMOL/L (ref 3.5–5.2)
RBC # BLD AUTO: 4.9 10*6/MM3 (ref 4.14–5.8)
SINUS: 3.9 CM
SODIUM SERPL-SCNC: 142 MMOL/L (ref 136–145)
STJ: 3.2 CM
STRESS TARGET HR: 122 BPM
WBC NRBC COR # BLD: 10.39 10*3/MM3 (ref 3.4–10.8)

## 2023-03-07 PROCEDURE — 93306 TTE W/DOPPLER COMPLETE: CPT | Performed by: INTERNAL MEDICINE

## 2023-03-07 PROCEDURE — 25510000001 PERFLUTREN (DEFINITY) 8.476 MG IN SODIUM CHLORIDE (PF) 0.9 % 10 ML INJECTION: Performed by: STUDENT IN AN ORGANIZED HEALTH CARE EDUCATION/TRAINING PROGRAM

## 2023-03-07 PROCEDURE — 36415 COLL VENOUS BLD VENIPUNCTURE: CPT

## 2023-03-07 PROCEDURE — 80048 BASIC METABOLIC PNL TOTAL CA: CPT

## 2023-03-07 PROCEDURE — 93306 TTE W/DOPPLER COMPLETE: CPT

## 2023-03-07 PROCEDURE — 85025 COMPLETE CBC W/AUTO DIFF WBC: CPT

## 2023-03-07 RX ADMIN — PERFLUTREN 1.5 ML: 6.52 INJECTION, SUSPENSION INTRAVENOUS at 14:50

## 2023-03-07 NOTE — PROGRESS NOTES
Patient had echo today showing decrease in EF from 45 to ~20%. He has been complaining of shortness of breath with exertion. Will schedule for cardiac cath, if normal coronaries then consider upgrade to BiV pacing

## 2023-03-08 ENCOUNTER — TELEPHONE (OUTPATIENT)
Dept: CARDIOLOGY | Facility: CLINIC | Age: 78
End: 2023-03-08
Payer: MEDICARE

## 2023-03-08 NOTE — TELEPHONE ENCOUNTER
Caller: RAYMODN QUAN    Relationship: DAUGHTER IN LAW     Best call back number: [phone]    What test was performed: ECHO    When was the test performed: 03/07/23    Where was the test performed: [facility]    Additional notes: PATIENT DAUGHTER IN LAW CALLED IN ASKING FOR DR. RODRIGUEZ TO CALL HER TO GO OVER RESULTS FOR THE ECHO HER FATHER IN LAW HAD YESTERDAY 03/07/23. SHE WOULD ALSO LIKE TO KNOW IF PATIENT NEEDS TO COME IN AND HAVE HIS HEART CATH SOONER THAN 03/13/23

## 2023-03-10 ENCOUNTER — TELEPHONE (OUTPATIENT)
Dept: CARDIOLOGY | Facility: CLINIC | Age: 78
End: 2023-03-10
Payer: MEDICARE

## 2023-03-10 DIAGNOSIS — R06.02 SHORTNESS OF BREATH: Primary | ICD-10-CM

## 2023-03-10 RX ORDER — POTASSIUM CHLORIDE 1500 MG/1
20 TABLET, FILM COATED, EXTENDED RELEASE ORAL DAILY
Qty: 14 TABLET | Refills: 0 | Status: SHIPPED | OUTPATIENT
Start: 2023-03-10 | End: 2023-03-24 | Stop reason: HOSPADM

## 2023-03-10 RX ORDER — FUROSEMIDE 40 MG/1
40 TABLET ORAL DAILY
Qty: 14 TABLET | Refills: 0 | Status: ON HOLD | OUTPATIENT
Start: 2023-03-10 | End: 2023-03-24 | Stop reason: SDUPTHER

## 2023-03-10 NOTE — TELEPHONE ENCOUNTER
Notified patient's daughter-in-law of recommendations. She verbalized understanding.    Yvette Ceja RN  Triage [facility]

## 2023-03-10 NOTE — TELEPHONE ENCOUNTER
Dr. Gordon/Adonis,    Pt's daughter-in-law, Mary, called this am. Pt is complaining of worsening dizziness, worsening SOA, a wet and non-productive cough, and a swollen abdomen. Mary asked if you have any recommendations for them before patient's cath on Monday.    Thank you,    Celi Thomas, RN  Triage [facility] [place]  03/10/23 09:23 EST

## 2023-03-10 NOTE — TELEPHONE ENCOUNTER
Called and left a voicemail for patient's daughter-in-law. Will continue to try to reach her.    Thank you,    Celi Thomas RN  Triage [facility] [place]  03/10/23 09:53 EST

## 2023-03-13 ENCOUNTER — HOSPITAL ENCOUNTER (OUTPATIENT)
Facility: HOSPITAL | Age: 78
Setting detail: HOSPITAL OUTPATIENT SURGERY
Discharge: HOME | End: 2023-03-13
Attending: STUDENT IN AN ORGANIZED HEALTH CARE EDUCATION/TRAINING PROGRAM | Admitting: STUDENT IN AN ORGANIZED HEALTH CARE EDUCATION/TRAINING PROGRAM
Payer: MEDICARE

## 2023-03-13 VITALS
BODY MASS INDEX: 33.5 KG/M2 | SYSTOLIC BLOOD PRESSURE: 134 MMHG | HEIGHT: 70 IN | HEART RATE: 69 BPM | RESPIRATION RATE: 18 BRPM | DIASTOLIC BLOOD PRESSURE: 86 MMHG | OXYGEN SATURATION: 95 % | WEIGHT: 234 LBS | TEMPERATURE: 98.1 F

## 2023-03-13 DIAGNOSIS — R06.02 SHORTNESS OF BREATH: ICD-10-CM

## 2023-03-13 PROCEDURE — C1769 GUIDE WIRE: HCPCS | Performed by: STUDENT IN AN ORGANIZED HEALTH CARE EDUCATION/TRAINING PROGRAM

## 2023-03-13 PROCEDURE — 25010000002 HEPARIN (PORCINE) PER 1000 UNITS: Performed by: STUDENT IN AN ORGANIZED HEALTH CARE EDUCATION/TRAINING PROGRAM

## 2023-03-13 PROCEDURE — 99152 MOD SED SAME PHYS/QHP 5/>YRS: CPT | Performed by: STUDENT IN AN ORGANIZED HEALTH CARE EDUCATION/TRAINING PROGRAM

## 2023-03-13 PROCEDURE — 25010000002 FENTANYL CITRATE (PF) 50 MCG/ML SOLUTION: Performed by: STUDENT IN AN ORGANIZED HEALTH CARE EDUCATION/TRAINING PROGRAM

## 2023-03-13 PROCEDURE — 93458 L HRT ARTERY/VENTRICLE ANGIO: CPT | Performed by: STUDENT IN AN ORGANIZED HEALTH CARE EDUCATION/TRAINING PROGRAM

## 2023-03-13 PROCEDURE — 25010000002 MIDAZOLAM PER 1 MG: Performed by: STUDENT IN AN ORGANIZED HEALTH CARE EDUCATION/TRAINING PROGRAM

## 2023-03-13 PROCEDURE — S0260 H&P FOR SURGERY: HCPCS | Performed by: STUDENT IN AN ORGANIZED HEALTH CARE EDUCATION/TRAINING PROGRAM

## 2023-03-13 PROCEDURE — 75710 ARTERY X-RAYS ARM/LEG: CPT | Performed by: STUDENT IN AN ORGANIZED HEALTH CARE EDUCATION/TRAINING PROGRAM

## 2023-03-13 PROCEDURE — C1894 INTRO/SHEATH, NON-LASER: HCPCS | Performed by: STUDENT IN AN ORGANIZED HEALTH CARE EDUCATION/TRAINING PROGRAM

## 2023-03-13 PROCEDURE — 25510000001 IOPAMIDOL PER 1 ML: Performed by: STUDENT IN AN ORGANIZED HEALTH CARE EDUCATION/TRAINING PROGRAM

## 2023-03-13 RX ORDER — SODIUM CHLORIDE 9 MG/ML
40 INJECTION, SOLUTION INTRAVENOUS AS NEEDED
Status: DISCONTINUED | OUTPATIENT
Start: 2023-03-13 | End: 2023-03-13

## 2023-03-13 RX ORDER — MIDAZOLAM HYDROCHLORIDE 1 MG/ML
INJECTION INTRAMUSCULAR; INTRAVENOUS
Status: DISCONTINUED | OUTPATIENT
Start: 2023-03-13 | End: 2023-03-13 | Stop reason: HOSPADM

## 2023-03-13 RX ORDER — SODIUM CHLORIDE 9 MG/ML
3 INJECTION, SOLUTION INTRAVENOUS CONTINUOUS
Status: DISCONTINUED | OUTPATIENT
Start: 2023-03-13 | End: 2023-03-13 | Stop reason: HOSPADM

## 2023-03-13 RX ORDER — HEPARIN SODIUM 1000 [USP'U]/ML
INJECTION, SOLUTION INTRAVENOUS; SUBCUTANEOUS
Status: DISCONTINUED | OUTPATIENT
Start: 2023-03-13 | End: 2023-03-13 | Stop reason: HOSPADM

## 2023-03-13 RX ORDER — SODIUM CHLORIDE 9 MG/ML
75 INJECTION, SOLUTION INTRAVENOUS CONTINUOUS
Status: DISCONTINUED | OUTPATIENT
Start: 2023-03-13 | End: 2023-03-13

## 2023-03-13 RX ORDER — HYDRALAZINE HYDROCHLORIDE 25 MG/1
25 TABLET, FILM COATED ORAL 3 TIMES DAILY
Qty: 270 TABLET | Refills: 3 | Status: SHIPPED | OUTPATIENT
Start: 2023-03-13 | End: 2023-03-24 | Stop reason: HOSPADM

## 2023-03-13 RX ORDER — LIDOCAINE HYDROCHLORIDE 20 MG/ML
INJECTION, SOLUTION INFILTRATION; PERINEURAL
Status: DISCONTINUED | OUTPATIENT
Start: 2023-03-13 | End: 2023-03-13 | Stop reason: HOSPADM

## 2023-03-13 RX ORDER — ACETAMINOPHEN 325 MG/1
650 TABLET ORAL EVERY 4 HOURS PRN
Status: DISCONTINUED | OUTPATIENT
Start: 2023-03-13 | End: 2023-03-13 | Stop reason: HOSPADM

## 2023-03-13 RX ORDER — FENTANYL CITRATE 50 UG/ML
INJECTION, SOLUTION INTRAMUSCULAR; INTRAVENOUS
Status: DISCONTINUED | OUTPATIENT
Start: 2023-03-13 | End: 2023-03-13 | Stop reason: HOSPADM

## 2023-03-13 RX ORDER — VERAPAMIL HYDROCHLORIDE 2.5 MG/ML
INJECTION, SOLUTION INTRAVENOUS
Status: DISCONTINUED | OUTPATIENT
Start: 2023-03-13 | End: 2023-03-13 | Stop reason: HOSPADM

## 2023-03-13 RX ORDER — SODIUM CHLORIDE 0.9 % (FLUSH) 0.9 %
10 SYRINGE (ML) INJECTION EVERY 12 HOURS SCHEDULED
Status: DISCONTINUED | OUTPATIENT
Start: 2023-03-13 | End: 2023-03-13 | Stop reason: HOSPADM

## 2023-03-13 RX ORDER — SODIUM CHLORIDE 0.9 % (FLUSH) 0.9 %
10 SYRINGE (ML) INJECTION AS NEEDED
Status: DISCONTINUED | OUTPATIENT
Start: 2023-03-13 | End: 2023-03-13 | Stop reason: HOSPADM

## 2023-03-13 RX ADMIN — SODIUM CHLORIDE 3 ML/KG/HR: 9 INJECTION, SOLUTION INTRAVENOUS at 09:59

## 2023-03-13 NOTE — DISCHARGE INSTRUCTIONS
[facility]  [address] [place] [address]    Coronary Angiogram (Radial/Ulnar Approach) After Care    Refer to this sheet in the next few weeks. These instructions provide you with information on caring for yourself after your procedure. Your caregiver may also give you more specific instructions. Your treatment has been planned according to current medical practices, but problems sometimes occur. Call your caregiver if you have any problems or questions after your procedure.    Home Care Instructions:  You may shower the day after the procedure. Remove the bandage (dressing) and gently wash the site with plain soap and water. Gently pat the site dry. You may apply a band aid daily for 2 days if desired.    Do not apply powder or lotion to the site.  Do not submerge the affected site in water for 3 to 5 days or until the site is completely healed.   Do not lift, push or pull anything over 5 pounds for 5 days after your procedure or as directed by your physician.  As a reference, a gallon of milk weighs 8 pounds.   Inspect the site at least twice daily. You may notice some bruising at the site and it may be tender for 1 to 2 weeks.     Increase your fluid intake for the next 2 days.    Keep arm elevated for 24 hours. For the remainder of the day, keep your arm in “Pledge of Allegiance” position when up and about.     You may drive 24 hours after the procedure unless otherwise instructed by your caregiver.  Do not operate machinery or power tools for 24 hours.  A responsible adult should be with you for the first 24 hours after you arrive home. Do not make any important legal decisions or sign legal papers for 24 hours.  Do not drink alcohol for 24 hours.    Metformin or any medications containing Metformin should not be taken for 48 hours after your procedure.      Call Your Doctor if:   You have unusual pain at the radial/ulnar (wrist) site.  You have redness, warmth, swelling, or pain at the  radial/ulnar (wrist) site.  You have drainage (other than a small amount of blood on the dressing).  `You have chills or a fever > 101.  Your arm becomes pale or dark, cool, tingly, or numb.  You develop chest pain, shortness of breath, feel faint or pass out.    You have heavy bleeding from the site, hold pressure on the site for 20 minutes.  If the bleeding stops, apply a fresh bandage and call your cardiologist.  However, if you        continue to have bleeding, call 911 and continue to apply pressure to the site.   You have any symptoms of a stroke.  Remember BE FAST  B-balance. Sudden trouble walking or loss of balance.  E-eyes.  Sudden changes in how you see or a sudden onset of a very bad headache.   F-face. Sudden weakness or loss of feeling of the face or facial droop on one side.   A-arms Sudden weakness or numbness in one arm.  One arm drifts down if they are both held out in front of you. This happens suddenly and usually on one side of the body.   S-speech.  Sudden trouble speaking, slurred speech or trouble understanding what are saying.   T-time  Time to call emergency services.  Write down the symptoms and the time they started.

## 2023-03-13 NOTE — H&P
Date of Hospital Visit: 23  Encounter Provider: Rod Ash MD  Place of Service: [facility] CARDIOLOGY  Patient Name: Orion Zepeda  :1945  [id number]    Chief complaint:  Dyspnea on exertion/shortness of breath    History of Present Illness:  77-year-old man with complete heart block status post dual-chamber pacemaker in 2019, hyperlipidemia who presented to outpatient EP clinic for follow-up and complained of shortness of breath at that time.  He subsequently underwent echocardiogram which revealed newly decreased EF to 20% from 45%.  He was referred for left heart catheterization for further evaluation of newly depressed EF.    Past Medical History:   Diagnosis Date   • Arthritis    • Complete heart block (HCC)    • Dislocated shoulder, right, subsequent encounter    • History of staph infection     AFTER SHOULDER SURGERY IN THE 70S   • Hyperlipidemia    • Nasal septal deviation    • Nasal turbinate hypertrophy    • SOB (shortness of breath) on exertion    • Syncope        Past Surgical History:   Procedure Laterality Date   • CARDIAC ELECTROPHYSIOLOGY PROCEDURE N/A 2019    Procedure: Temporary Pacemaker;  Surgeon: Vonnie Jeffries MD;  Location: [facility] INVASIVE LOCATION;  Service: Cardiology   • CARDIAC ELECTROPHYSIOLOGY PROCEDURE N/A 2019    Procedure: Pacemaker DC new  [place];  Surgeon: Bryn Gordon MD;  Location: [facility] INVASIVE LOCATION;  Service: Cardiovascular   • CARDIAC ELECTROPHYSIOLOGY PROCEDURE N/A 2019    Procedure: Temporary Pacemaker- Reposition;  Surgeon: Vonnie Jeffries MD;  Location: [facility] INVASIVE LOCATION;  Service: Cardiology   • DIAGNOSTIC LAPAROSCOPY N/A 09/15/2019    Procedure: DIAGNOSTIC LAPAROSCOPY;  Surgeon: Sonya Sheikh MD;  Location: [facility] OR;  Service: General   • INGUINAL HERNIA REPAIR N/A 09/15/2019    Procedure: left INGUINAL HERNIA REPAIR LAPAROSCOPIC,  REDUCTION OF SMALL BOWEL;  Surgeon: Sonya Sheikh MD;  Location: [facility] MAIN OR;  Service: General   • SEPTOPLASTY, RESECTION INFERIOR TURBINATES N/A 2022    Procedure: SEPTOPLASTY, SUBMUCOUS RESECTION INFERIOR TURBINATES, NASAL ENDOSCOPY and ENDOSCOPIC POLYPECTOMY;  Surgeon: Nj Peterson MD;  Location: [facility] OR [facility];  Service: ENT;  Laterality: N/A;   • SHOULDER SURGERY Left     in his 20s; ligamentous surgery       Medications Prior to Admission   Medication Sig Dispense Refill Last Dose   • albuterol sulfate  (90 Base) MCG/ACT inhaler Inhale 2 puffs Every 4 (Four) Hours As Needed for Wheezing or Shortness of Air. 18 g 1 Past Week   • atorvastatin (Lipitor) 40 MG tablet Take 1 tablet by mouth Daily. 90 tablet 1 3/12/2023   • furosemide (LASIX) 40 MG tablet Take 1 tablet by mouth Daily. 14 tablet 0 3/12/2023   • potassium chloride (K-TAB) 20 MEQ tablet controlled-release ER tablet Take 1 tablet by mouth Daily. 14 tablet 0 3/12/2023       Current Meds  No current facility-administered medications on file prior to encounter.     Current Outpatient Medications on File Prior to Encounter   Medication Sig Dispense Refill   • albuterol sulfate  (90 Base) MCG/ACT inhaler Inhale 2 puffs Every 4 (Four) Hours As Needed for Wheezing or Shortness of Air. 18 g 1   • atorvastatin (Lipitor) 40 MG tablet Take 1 tablet by mouth Daily. 90 tablet 1       Social History     Socioeconomic History   • Marital status:    • Number of children: 2   Tobacco Use   • Smoking status: Former     Packs/day: 1.00     Years: 50.00     Pack years: 50.00     Types: Cigarettes     Quit date: 2019     Years since quittin.1   • Smokeless tobacco: Never   Vaping Use   • Vaping Use: Never used   Substance and Sexual Activity   • Alcohol use: Never   • Drug use: Yes     Types: Marijuana     Comment: USES 3-4 TIMES WEEK   • Sexual activity: Defer       Family Hx: Non-contributory    REVIEW OF SYSTEMS:   TOI was  "performed and is negative except as outlined in HPI     REVIEW OF SYSTEMS:   CONSTITUTIONAL: No weight loss, fever, chills, weakness or fatigue.   HEENT: Eyes: No visual loss, blurred vision, double vision or yellow sclerae. Ears, Nose, Throat: No hearing loss, sneezing, congestion, runny nose or sore throat.   SKIN: No rash or itching.     RESPIRATORY: No shortness of breath, hemoptysis, cough or sputum.   GASTROINTESTINAL: No anorexia, nausea, vomiting or diarrhea. No abdominal pain, bright red blood per rectum or melena.  NEUROLOGICAL: No headache, dizziness, syncope, paralysis, numbness or tingling in the extremities.  MUSCULOSKELETAL: No muscle, back pain, joint pain or stiffness.   HEMATOLOGIC: No anemia, bleeding or bruising.   LYMPHATICS: No enlarged nodes.  PSYCHIATRIC: No history of depression, anxiety, hallucinations.   ENDOCRINOLOGIC: No reports of sweating, cold or heat intolerance. No polyuria or polydipsia.        Objective:     Vitals:    03/13/23 0941 03/13/23 0955   BP:  129/85   BP Location:  Right arm   Pulse:  105   Resp:  22   Temp:  98.1 °F (36.7 °C)   TempSrc:  Oral   SpO2:  95%   Weight: 106 kg (234 lb)    Height:  177.8 cm (70\")     Body mass index is 33.58 kg/m².  Flowsheet Rows    Flowsheet Row First Filed Value   Admission Height 177.8 cm (70\") Documented at 03/13/2023 0955   Admission Weight 106 kg (234 lb) Documented at 03/13/2023 0941          GEN: no distress, alert and oriented  HEENT: NACT, EOMI, moist mucous membranes  Lungs: CTAB, no wheezes, rales or rhonchi  CV: normal rate, regular rhythm, normal S1, S2, no murmurs, +2 radial pulses b/l, no carotid bruit  Abdomen: soft, nontender, nondistended, NABS  Extremities: no edema  Skin: no rash, warm, dry  Heme/Lymph: no bruising  Psych: organized thought, normal behavior and affect       Results from last 7 days   Lab Units 03/07/23  1547   SODIUM mmol/L 142   POTASSIUM mmol/L 3.9   CHLORIDE mmol/L 104   CO2 mmol/L 25.8   BUN mg/dL " 23   CREATININE mg/dL 1.32*   CALCIUM mg/dL 9.7   GLUCOSE mg/dL 97         @LABRCNTbnp@  Results from last 7 days   Lab Units 03/07/23  1547   WBC 10*3/mm3 10.39   HEMOGLOBIN g/dL 15.5   HEMATOCRIT % 45.7   PLATELETS 10*3/mm3 273               I personally viewed and interpreted the patient's EKG/Telemetry data      Assessment:  Active Hospital Problems    Diagnosis  POA   • **Shortness of breath [R06.02]  Unknown      Resolved Hospital Problems   No resolved problems to display.       Plan:  - Left heart catheterization for ischemic evaluation of newly depressed EF        Rod Ash MD  03/13/23  12:09 EDT.

## 2023-03-14 DIAGNOSIS — I44.2 THIRD DEGREE HEART BLOCK: Primary | ICD-10-CM

## 2023-03-17 ENCOUNTER — HOSPITAL ENCOUNTER (INPATIENT)
Facility: HOSPITAL | Age: 78
LOS: 6 days | Discharge: HOME | DRG: 227 | End: 2023-03-24
Attending: EMERGENCY MEDICINE | Admitting: INTERNAL MEDICINE
Payer: MEDICARE

## 2023-03-17 ENCOUNTER — TELEPHONE (OUTPATIENT)
Dept: CARDIOLOGY | Facility: CLINIC | Age: 78
End: 2023-03-17
Payer: MEDICARE

## 2023-03-17 DIAGNOSIS — I47.20 VENTRICULAR TACHYARRHYTHMIA: Primary | ICD-10-CM

## 2023-03-17 DIAGNOSIS — R55 SYNCOPE, UNSPECIFIED SYNCOPE TYPE: ICD-10-CM

## 2023-03-17 DIAGNOSIS — I25.5 ISCHEMIC CARDIOMYOPATHY: ICD-10-CM

## 2023-03-17 DIAGNOSIS — R06.02 SHORTNESS OF BREATH: ICD-10-CM

## 2023-03-17 DIAGNOSIS — I44.2 THIRD DEGREE HEART BLOCK: ICD-10-CM

## 2023-03-17 DIAGNOSIS — I42.9 CARDIOMYOPATHY, UNSPECIFIED TYPE: ICD-10-CM

## 2023-03-17 LAB
ALBUMIN SERPL-MCNC: 4 G/DL (ref 3.5–5.2)
ALBUMIN/GLOB SERPL: 1.2 G/DL
ALP SERPL-CCNC: 70 U/L (ref 39–117)
ALT SERPL W P-5'-P-CCNC: 25 U/L (ref 1–41)
ANION GAP SERPL CALCULATED.3IONS-SCNC: 11 MMOL/L (ref 5–15)
AST SERPL-CCNC: 17 U/L (ref 1–40)
BASOPHILS # BLD AUTO: 0.05 10*3/MM3 (ref 0–0.2)
BASOPHILS NFR BLD AUTO: 0.6 % (ref 0–1.5)
BILIRUB SERPL-MCNC: 1.1 MG/DL (ref 0–1.2)
BUN SERPL-MCNC: 19 MG/DL (ref 8–23)
BUN/CREAT SERPL: 19 (ref 7–25)
CALCIUM SPEC-SCNC: 9.6 MG/DL (ref 8.6–10.5)
CHLORIDE SERPL-SCNC: 106 MMOL/L (ref 98–107)
CO2 SERPL-SCNC: 23 MMOL/L (ref 22–29)
CREAT SERPL-MCNC: 1 MG/DL (ref 0.76–1.27)
DEPRECATED RDW RBC AUTO: 41.6 FL (ref 37–54)
EGFRCR SERPLBLD CKD-EPI 2021: 78 ML/MIN/1.73
EOSINOPHIL # BLD AUTO: 0.28 10*3/MM3 (ref 0–0.4)
EOSINOPHIL NFR BLD AUTO: 3.1 % (ref 0.3–6.2)
ERYTHROCYTE [DISTWIDTH] IN BLOOD BY AUTOMATED COUNT: 12.5 % (ref 12.3–15.4)
GLOBULIN UR ELPH-MCNC: 3.3 GM/DL
GLUCOSE SERPL-MCNC: 97 MG/DL (ref 65–99)
HCT VFR BLD AUTO: 42.7 % (ref 37.5–51)
HGB BLD-MCNC: 15.1 G/DL (ref 13–17.7)
IMM GRANULOCYTES # BLD AUTO: 0.03 10*3/MM3 (ref 0–0.05)
IMM GRANULOCYTES NFR BLD AUTO: 0.3 % (ref 0–0.5)
LYMPHOCYTES # BLD AUTO: 2.29 10*3/MM3 (ref 0.7–3.1)
LYMPHOCYTES NFR BLD AUTO: 25.4 % (ref 19.6–45.3)
MAGNESIUM SERPL-MCNC: 2.2 MG/DL (ref 1.6–2.4)
MCH RBC QN AUTO: 32.6 PG (ref 26.6–33)
MCHC RBC AUTO-ENTMCNC: 35.4 G/DL (ref 31.5–35.7)
MCV RBC AUTO: 92.2 FL (ref 79–97)
MONOCYTES # BLD AUTO: 0.63 10*3/MM3 (ref 0.1–0.9)
MONOCYTES NFR BLD AUTO: 7 % (ref 5–12)
NEUTROPHILS NFR BLD AUTO: 5.74 10*3/MM3 (ref 1.7–7)
NEUTROPHILS NFR BLD AUTO: 63.6 % (ref 42.7–76)
NRBC BLD AUTO-RTO: 0 /100 WBC (ref 0–0.2)
PLATELET # BLD AUTO: 247 10*3/MM3 (ref 140–450)
PMV BLD AUTO: 9.5 FL (ref 6–12)
POTASSIUM SERPL-SCNC: 3.6 MMOL/L (ref 3.5–5.2)
PROT SERPL-MCNC: 7.3 G/DL (ref 6–8.5)
QT INTERVAL: 435 MS
RBC # BLD AUTO: 4.63 10*6/MM3 (ref 4.14–5.8)
SODIUM SERPL-SCNC: 140 MMOL/L (ref 136–145)
TROPONIN T SERPL HS-MCNC: 26 NG/L
WBC NRBC COR # BLD: 9.02 10*3/MM3 (ref 3.4–10.8)

## 2023-03-17 PROCEDURE — 84484 ASSAY OF TROPONIN QUANT: CPT

## 2023-03-17 PROCEDURE — 25010000002 AMIODARONE IN DEXTROSE 5% 150-4.21 MG/100ML-% SOLUTION

## 2023-03-17 PROCEDURE — 99284 EMERGENCY DEPT VISIT MOD MDM: CPT

## 2023-03-17 PROCEDURE — 36415 COLL VENOUS BLD VENIPUNCTURE: CPT

## 2023-03-17 PROCEDURE — 80053 COMPREHEN METABOLIC PANEL: CPT | Performed by: EMERGENCY MEDICINE

## 2023-03-17 PROCEDURE — 83735 ASSAY OF MAGNESIUM: CPT | Performed by: EMERGENCY MEDICINE

## 2023-03-17 PROCEDURE — G0378 HOSPITAL OBSERVATION PER HR: HCPCS

## 2023-03-17 PROCEDURE — 93010 ELECTROCARDIOGRAM REPORT: CPT | Performed by: INTERNAL MEDICINE

## 2023-03-17 PROCEDURE — 84484 ASSAY OF TROPONIN QUANT: CPT | Performed by: EMERGENCY MEDICINE

## 2023-03-17 PROCEDURE — 93005 ELECTROCARDIOGRAM TRACING: CPT

## 2023-03-17 PROCEDURE — 85025 COMPLETE CBC W/AUTO DIFF WBC: CPT | Performed by: EMERGENCY MEDICINE

## 2023-03-17 PROCEDURE — 25010000002 AMIODARONE IN DEXTROSE 5% 360-4.14 MG/200ML-% SOLUTION

## 2023-03-17 PROCEDURE — 93005 ELECTROCARDIOGRAM TRACING: CPT | Performed by: EMERGENCY MEDICINE

## 2023-03-17 RX ORDER — ATORVASTATIN CALCIUM 20 MG/1
40 TABLET, FILM COATED ORAL DAILY
Status: DISCONTINUED | OUTPATIENT
Start: 2023-03-18 | End: 2023-03-24 | Stop reason: HOSPADM

## 2023-03-17 RX ORDER — POTASSIUM CHLORIDE 750 MG/1
40 TABLET, FILM COATED, EXTENDED RELEASE ORAL ONCE
Status: COMPLETED | OUTPATIENT
Start: 2023-03-17 | End: 2023-03-17

## 2023-03-17 RX ORDER — MULTIPLE VITAMINS W/ MINERALS TAB 9MG-400MCG
1 TAB ORAL DAILY
COMMUNITY

## 2023-03-17 RX ORDER — ONDANSETRON 4 MG/1
4 TABLET, FILM COATED ORAL EVERY 6 HOURS PRN
Status: DISCONTINUED | OUTPATIENT
Start: 2023-03-17 | End: 2023-03-24 | Stop reason: HOSPADM

## 2023-03-17 RX ORDER — CHLORAL HYDRATE 500 MG
1000 CAPSULE ORAL
COMMUNITY
End: 2023-03-17

## 2023-03-17 RX ORDER — ONDANSETRON 2 MG/ML
4 INJECTION INTRAMUSCULAR; INTRAVENOUS EVERY 6 HOURS PRN
Status: DISCONTINUED | OUTPATIENT
Start: 2023-03-17 | End: 2023-03-24 | Stop reason: HOSPADM

## 2023-03-17 RX ORDER — ECHINACEA PURPUREA EXTRACT 125 MG
1 TABLET ORAL AS NEEDED
COMMUNITY

## 2023-03-17 RX ORDER — FUROSEMIDE 40 MG/1
40 TABLET ORAL DAILY
Status: DISCONTINUED | OUTPATIENT
Start: 2023-03-18 | End: 2023-03-24 | Stop reason: HOSPADM

## 2023-03-17 RX ORDER — POTASSIUM CHLORIDE 750 MG/1
20 TABLET, FILM COATED, EXTENDED RELEASE ORAL DAILY
Status: DISCONTINUED | OUTPATIENT
Start: 2023-03-18 | End: 2023-03-20

## 2023-03-17 RX ORDER — HYDRALAZINE HYDROCHLORIDE 25 MG/1
25 TABLET, FILM COATED ORAL 3 TIMES DAILY
Status: DISCONTINUED | OUTPATIENT
Start: 2023-03-17 | End: 2023-03-20

## 2023-03-17 RX ORDER — ALBUTEROL SULFATE 2.5 MG/3ML
2.5 SOLUTION RESPIRATORY (INHALATION) EVERY 4 HOURS PRN
Status: DISCONTINUED | OUTPATIENT
Start: 2023-03-17 | End: 2023-03-24 | Stop reason: HOSPADM

## 2023-03-17 RX ORDER — ACETAMINOPHEN 325 MG/1
650 TABLET ORAL EVERY 4 HOURS PRN
Status: DISCONTINUED | OUTPATIENT
Start: 2023-03-17 | End: 2023-03-24 | Stop reason: HOSPADM

## 2023-03-17 RX ORDER — AMIODARONE HYDROCHLORIDE 200 MG/1
400 TABLET ORAL 2 TIMES DAILY WITH MEALS
Status: DISCONTINUED | OUTPATIENT
Start: 2023-03-18 | End: 2023-03-24

## 2023-03-17 RX ADMIN — HYDRALAZINE HYDROCHLORIDE 25 MG: 25 TABLET, FILM COATED ORAL at 21:09

## 2023-03-17 RX ADMIN — POTASSIUM CHLORIDE 40 MEQ: 750 TABLET, EXTENDED RELEASE ORAL at 16:02

## 2023-03-17 RX ADMIN — AMIODARONE HYDROCHLORIDE 1 MG/MIN: 1.8 INJECTION, SOLUTION INTRAVENOUS at 19:13

## 2023-03-17 RX ADMIN — AMIODARONE HYDROCHLORIDE 150 MG: 1.5 INJECTION, SOLUTION INTRAVENOUS at 18:58

## 2023-03-17 NOTE — TELEPHONE ENCOUNTER
It appears this pt was recently seen in hospital on 3/13. Their BSX pacemaker report shows that between March 6-11th there are 1623 new VT/NSVT events on counters (mostly VT labeled). Their heart rates range 140s-220s bpm with the longest lasting 45 mins on 3/10 (no data to view). Events with strips show V>A arrhythmia suggestive of true VT. I have included a strip below from one of the events and the events list. I am not sure if this information was known when pt was in the hospital. I tried to call pt was unable to get through.    Kindly,   Meg Schotanus Device RN

## 2023-03-17 NOTE — ED PROVIDER NOTES
EMERGENCY DEPARTMENT ENCOUNTER    Room Number:  N426/1  Date seen:  3/17/2023  PCP: Bradley Retana DO  Historian: Patient, daughter      HPI:  Chief Complaint: Dizziness  A complete HPI/ROS/PMH/PSH/SH/FH are unobtainable due to: Nothing  Context: Orion Zepeda is a 77 y.o. male who presents to the ED c/o intermittent dizziness.  He also reports that he has had shortness of breath.  The symptoms are not necessarily worse today, however he received a call from his cardiologist notifying him that his pacemaker report demonstrated ventricular tachycardia.    He denies chest pain.  He has had no syncope.  He just had a heart cath and echo in the last week.           PAST MEDICAL HISTORY  Active Ambulatory Problems     Diagnosis Date Noted   • Third degree heart block (HCC) 05/05/2019   • Syncope 01/01/2018   • Shortness of breath 03/01/2022     Resolved Ambulatory Problems     Diagnosis Date Noted   • Incarcerated left inguinal hernia 09/15/2019     Past Medical History:   Diagnosis Date   • Arthritis    • Complete heart block (HCC) 2019   • Dislocated shoulder, right, subsequent encounter 2011   • History of staph infection    • Hyperlipidemia    • Nasal septal deviation    • Nasal turbinate hypertrophy    • SOB (shortness of breath) on exertion          PAST SURGICAL HISTORY  Past Surgical History:   Procedure Laterality Date   • CARDIAC CATHETERIZATION N/A 3/13/2023    Procedure: Left Heart Cath;  Surgeon: Rod Ash MD;  Location: [facility] INVASIVE LOCATION;  Service: Cardiology;  Laterality: N/A;   • CARDIAC ELECTROPHYSIOLOGY PROCEDURE N/A 05/05/2019    Procedure: Temporary Pacemaker;  Surgeon: Vonnie Jeffries MD;  Location: [facility] CATH INVASIVE LOCATION;  Service: Cardiology   • CARDIAC ELECTROPHYSIOLOGY PROCEDURE N/A 05/06/2019    Procedure: Pacemaker DC new  [place];  Surgeon: Bryn Gordon MD;  Location: [facility] CATH INVASIVE LOCATION;  Service: Cardiovascular   • CARDIAC ELECTROPHYSIOLOGY  PROCEDURE N/A 2019    Procedure: Temporary Pacemaker- Reposition;  Surgeon: Vonnie Jeffries MD;  Location: [facility] CATH INVASIVE LOCATION;  Service: Cardiology   • DIAGNOSTIC LAPAROSCOPY N/A 09/15/2019    Procedure: DIAGNOSTIC LAPAROSCOPY;  Surgeon: Sonya Sheikh MD;  Location: [facility] MAIN OR;  Service: General   • INGUINAL HERNIA REPAIR N/A 09/15/2019    Procedure: left INGUINAL HERNIA REPAIR LAPAROSCOPIC, REDUCTION OF SMALL BOWEL;  Surgeon: Sonya Sheikh MD;  Location: [facility] OR;  Service: General   • SEPTOPLASTY, RESECTION INFERIOR TURBINATES N/A 2022    Procedure: SEPTOPLASTY, SUBMUCOUS RESECTION INFERIOR TURBINATES, NASAL ENDOSCOPY and ENDOSCOPIC POLYPECTOMY;  Surgeon: Nj Peterson MD;  Location: [facility] OR OSC;  Service: ENT;  Laterality: N/A;   • SHOULDER SURGERY Left     in his 20s; ligamentous surgery         FAMILY HISTORY  Family History   Problem Relation Age of Onset   • Stroke Mother    • Tuberculosis Mother    • Lung cancer Father    • Rheumatic fever Brother    • Malig Hyperthermia Neg Hx          SOCIAL HISTORY  Social History     Socioeconomic History   • Marital status:    • Number of children: 2   Tobacco Use   • Smoking status: Former     Packs/day: 1.00     Years: 50.00     Pack years: 50.00     Types: Cigarettes     Quit date:      Years since quittin.2   • Smokeless tobacco: Never   Vaping Use   • Vaping Use: Never used   Substance and Sexual Activity   • Alcohol use: Never   • Drug use: Yes     Types: Marijuana     Comment: USES 3-4 TIMES WEEK   • Sexual activity: Defer         ALLERGIES  Patient has no known allergies.        REVIEW OF SYSTEMS  Review of Systems   Review of all 14 systems is negative other than stated in the HPI above.      PHYSICAL EXAM  ED Triage Vitals   Temp Heart Rate Resp BP SpO2   23 1357 23 1357 23 1357 23 1407 23 1357   96.8 °F (36 °C) 105 17 137/85 96 %      Temp src Heart Rate Source  Patient Position BP Location FiO2 (%)   03/17/23 1357 -- -- -- --   Tympanic           Physical Exam      GENERAL: Awake and alert, no acute distress  HENT: nares patent  EYES: no scleral icterus, EOMI   CV: regular rhythm, normal rate, no murmur, pacemaker left anterior chest wall, no peripheral edema  RESPIRATORY: normal effort, lungs clear bilaterally  ABDOMEN: soft, nondistended, nontender throughout  MUSCULOSKELETAL: no deformity  NEURO: alert, moves all extremities, follows commands, cranial nerves II through XII grossly intact, speech fluent and clear  PSYCH:  calm, cooperative  SKIN: warm, dry    Vital signs and nursing notes reviewed.          LAB RESULTS  Recent Results (from the past 24 hour(s))   ECG 12 Lead Other; dizziness    Collection Time: 03/17/23  2:01 PM   Result Value Ref Range    QT Interval 435 ms   Comprehensive Metabolic Panel    Collection Time: 03/17/23  2:34 PM    Specimen: Blood   Result Value Ref Range    Glucose 97 65 - 99 mg/dL    BUN 19 8 - 23 mg/dL    Creatinine 1.00 0.76 - 1.27 mg/dL    Sodium 140 136 - 145 mmol/L    Potassium 3.6 3.5 - 5.2 mmol/L    Chloride 106 98 - 107 mmol/L    CO2 23.0 22.0 - 29.0 mmol/L    Calcium 9.6 8.6 - 10.5 mg/dL    Total Protein 7.3 6.0 - 8.5 g/dL    Albumin 4.0 3.5 - 5.2 g/dL    ALT (SGPT) 25 1 - 41 U/L    AST (SGOT) 17 1 - 40 U/L    Alkaline Phosphatase 70 39 - 117 U/L    Total Bilirubin 1.1 0.0 - 1.2 mg/dL    Globulin 3.3 gm/dL    A/G Ratio 1.2 g/dL    BUN/Creatinine Ratio 19.0 7.0 - 25.0    Anion Gap 11.0 5.0 - 15.0 mmol/L    eGFR 78.0 >60.0 mL/min/1.73   High Sensitivity Troponin T    Collection Time: 03/17/23  2:34 PM    Specimen: Blood   Result Value Ref Range    HS Troponin T 26 (H) <15 ng/L   CBC Auto Differential    Collection Time: 03/17/23  2:34 PM    Specimen: Blood   Result Value Ref Range    WBC 9.02 3.40 - 10.80 10*3/mm3    RBC 4.63 4.14 - 5.80 10*6/mm3    Hemoglobin 15.1 13.0 - 17.7 g/dL    Hematocrit 42.7 37.5 - 51.0 %    MCV 92.2 79.0  - 97.0 fL    MCH 32.6 26.6 - 33.0 pg    MCHC 35.4 31.5 - 35.7 g/dL    RDW 12.5 12.3 - 15.4 %    RDW-SD 41.6 37.0 - 54.0 fl    MPV 9.5 6.0 - 12.0 fL    Platelets 247 140 - 450 10*3/mm3    Neutrophil % 63.6 42.7 - 76.0 %    Lymphocyte % 25.4 19.6 - 45.3 %    Monocyte % 7.0 5.0 - 12.0 %    Eosinophil % 3.1 0.3 - 6.2 %    Basophil % 0.6 0.0 - 1.5 %    Immature Grans % 0.3 0.0 - 0.5 %    Neutrophils, Absolute 5.74 1.70 - 7.00 10*3/mm3    Lymphocytes, Absolute 2.29 0.70 - 3.10 10*3/mm3    Monocytes, Absolute 0.63 0.10 - 0.90 10*3/mm3    Eosinophils, Absolute 0.28 0.00 - 0.40 10*3/mm3    Basophils, Absolute 0.05 0.00 - 0.20 10*3/mm3    Immature Grans, Absolute 0.03 0.00 - 0.05 10*3/mm3    nRBC 0.0 0.0 - 0.2 /100 WBC   Magnesium    Collection Time: 03/17/23  2:34 PM    Specimen: Blood   Result Value Ref Range    Magnesium 2.2 1.6 - 2.4 mg/dL       Ordered the above labs and reviewed the results.        RADIOLOGY  No Radiology Exams Resulted Within Past 24 Hours    Ordered the above noted radiological studies. Reviewed by me in PACS.            PROCEDURES  Procedures              MEDICATIONS GIVEN IN ER  Medications   amiodarone in dextrose 5% (NEXTERONE) loading dose 150mg/100mL (0 mg Intravenous Stopped 3/17/23 2007)     Followed by   amiodarone 360 mg in 200 mL D5W infusion (1 mg/min Intravenous New Bag 3/17/23 1913)     Followed by   amiodarone 360 mg in 200 mL D5W infusion (has no administration in time range)   amiodarone (PACERONE) tablet 400 mg (has no administration in time range)   albuterol (PROVENTIL) nebulizer solution 0.083% 2.5 mg/3mL (has no administration in time range)   atorvastatin (LIPITOR) tablet 40 mg (has no administration in time range)   hydrALAZINE (APRESOLINE) tablet 25 mg (25 mg Oral Given 3/17/23 2109)   furosemide (LASIX) tablet 40 mg (has no administration in time range)   potassium chloride (K-DUR,KLOR-CON) ER tablet 20 mEq (has no administration in time range)   acetaminophen (TYLENOL)  tablet 650 mg (has no administration in time range)   ondansetron (ZOFRAN) tablet 4 mg (has no administration in time range)     Or   ondansetron (ZOFRAN) injection 4 mg (has no administration in time range)   potassium chloride (K-DUR,KLOR-CON) ER tablet 40 mEq (40 mEq Oral Given 3/17/23 1602)                   MEDICAL DECISION MAKING, PROGRESS, and CONSULTS    All labs have been independently reviewed by me.  All radiology studies have been reviewed by me and I have also reviewed the radiology report.   EKG's independently viewed and interpreted by me.  Discussion below represents my analysis of pertinent findings related to patient's condition, differential diagnosis, treatment plan and final disposition.      Additional sources:  - Discussed/ obtained information from independent historians: HPI obtained in part from patient's daughter who is also a nurse practitioner    - External (non-ED) record review: Recent heart cath and echo reviewed and summarized below    - Chronic or social conditions impacting care: Cardiomyopathy          Orders placed during this visit:  Orders Placed This Encounter   Procedures   • Comprehensive Metabolic Panel   • High Sensitivity Troponin T   • CBC Auto Differential   • Magnesium   • High Sensitivity Troponin T 2Hr   • Basic Metabolic Panel   • CBC Auto Differential   • Diet: Regular/House Diet; Texture: Regular Texture (IDDSI 7); Fluid Consistency: Thin (IDDSI 0)   • Vital Signs   • Cardiac Monitoring   • Intake & Output   • Weigh Patient   • Place Sequential Compression Device   • Maintain Sequential Compression Device   • Activity - Ad Laura   • Code Status and Medical Interventions:   • LCG (on-call MD unless specified)   • Oxygen Therapy- Nasal Cannula; Titrate for SPO2: 90% - 95%   • ECG 12 Lead Other; dizziness   • ECG 12 Lead   • SCANNED - TELEMETRY     • Initiate Observation Status   • CBC & Differential             Differential diagnosis:    Ischemic VT  Electrolyte  disturbance  QT prolongation        Independent interpretation of labs, radiology studies, and discussions with consultants:  ED Course as of 03/17/23 2348   Fri Mar 17, 2023   1512 Magnesium: 2.2 [JR]   1512 Creatinine: 1.00 [JR]   1512 WBC: 9.02 [JR]   1520 Record review: I reviewed telephone encounter with cardiology from earlier today.  Patient has been having frequent VT and nonsustained VT noted on recent pacemaker report.  Longest episode lasted 45 minutes on 3/10.  Patient had a cardiac cath on 3/13/2023 that showed mild coronary disease.  Echo on 3/7/2023 reported EF 21 to 25%. [JR]   1537 EKG          EKG time: 1401  Rhythm/Rate: Sinus rhythm, 96  P waves and ID: Normal  QRS, axis: LBBB, LVH  ST and T waves: No acute ischemic changes    Interpreted Contemporaneously by me, independently viewed  Similar compared to prior 9/15/2019 however LBBB appears new today, may be secondary to ventricular pacing       [JR]   1539 Potassium: 3.6 [JR]   1539 Magnesium: 2.2 [JR]   1539 QT is prolonged.  Potassium slightly low.  I will order additional oral potassium replacement. [JR]   1553 Discussed with Dr. Gordon, cardiologist, who agrees to admit for further management. [JR]      ED Course User Index  [JR] Juvenal Crockett MD             I wore an N95 mask, face shield, and gloves during this patient encounter.  Patient also wearing a surgical mask.  Hand hygeine performed before and after seeing the patient.    DIAGNOSIS  Final diagnoses:   Ventricular tachyarrhythmia   Cardiomyopathy, unspecified type (HCC)         DISPOSITION  Admit            Latest Documented Vital Signs:  As of 23:48 EDT  BP- 132/72 HR- 85 Temp- 98 °F (36.7 °C) (Oral) O2 sat- 96%              --    Please note that portions of this were completed with a voice recognition program.       Note Disclaimer: At [facility], we believe that sharing information builds trust and better relationships. You are receiving this note because you are  receiving care at [facility] or recently visited. It is possible you will see health information before a provider has talked with you about it. This kind of information can be easy to misunderstand. To help you fully understand what it means for your health, we urge you to discuss this note with your provider.           Juvenal Crockett MD  03/17/23 2066

## 2023-03-17 NOTE — PROGRESS NOTES
Patient had dual chamber [place] pacemaker placed in 2019. Earlier this year he complained of shortness of breath. He RV paces 100%. Echo last year showed EF of 45%, repeat echo last month EF was severely decreased to 20%. He had cardiac cath on 3/13 which showed no significant CAD. Presumed pacemaker medicated CM due to increased RV pacing. We planned on doing upgrade to CRT-P as outpatient. Received alert today that he had 1000+ NSVT episodes over the past few days. Reviewed the tracings and unable to rule out VT so admitted him for amiodarone load and possible extraction/upgrade to CRT-D next week given the possible VT.       We will have [place] interrogate device to look for arrhythmias  Plan to load with IV amiodarone. Will start 400mg BID PO once drip is finished.   Patient does not need to be NPO Sunday night. Extraction likely will not occur until later next week.

## 2023-03-17 NOTE — ED TRIAGE NOTES
Pt to ER from home via PV, states he was due to have pacemaker adjusted and cardiologist told him he needed to come to ER, c/o dizziness, denies CP. Pt arrives aaox4, abc's intact,  Ambulatory with steady gait, NAD noted at this time.

## 2023-03-17 NOTE — PROGRESS NOTES
Clinical Pharmacy Services: Medication History    Orion Zepeda is a 77 y.o. male presenting to [facility] for   Chief Complaint   Patient presents with   • Dizziness       He  has a past medical history of Arthritis, Complete heart block (HCC) (2019), Dislocated shoulder, right, subsequent encounter (2011), History of staph infection, Hyperlipidemia, Nasal septal deviation, Nasal turbinate hypertrophy, SOB (shortness of breath) on exertion, and Syncope (2018).    Allergies as of 03/17/2023   • (No Known Allergies)       Medication information was obtained from: Patient   Pharmacy and Phone Number:    Prior to Admission Medications     Prescriptions Last Dose Informant Patient Reported? Taking?    albuterol sulfate  (90 Base) MCG/ACT inhaler Past Week Self No Yes    Inhale 2 puffs Every 4 (Four) Hours As Needed for Wheezing or Shortness of Air.    atorvastatin (Lipitor) 40 MG tablet 3/17/2023 Self No Yes    Take 1 tablet by mouth Daily.    hydrALAZINE (APRESOLINE) 25 MG tablet 3/17/2023 Self No Yes    Take 1 tablet by mouth 3 (Three) Times a Day.    multivitamin with minerals (SENIOR MULTIVITAMIN PLUS PO) 3/17/2023 Self Yes Yes    Take 1 tablet by mouth Daily.    Omega-3 Fatty Acids (fish oil) 1000 MG capsule capsule 3/17/2023 Self Yes Yes    Take 1 capsule by mouth Daily With Breakfast.    sodium chloride 0.65 % nasal spray 3/17/2023 Self Yes Yes    1 spray into the nostril(s) as directed by provider As Needed for Congestion.    furosemide (LASIX) 40 MG tablet   No No    Take 1 tablet by mouth Daily.    potassium chloride (K-TAB) 20 MEQ tablet controlled-release ER tablet   No No    Take 1 tablet by mouth Daily.            Medication notes:     This medication list is complete to the best of my knowledge as of 3/17/2023    Please call if questions.    Ziggy Sanchez  Medication History Technician  [phone]    3/17/2023 15:44 EDT

## 2023-03-17 NOTE — TELEPHONE ENCOUNTER
Did a LHC on him on 3/13, I don't remember much ectopy then. It appears this all happened prior to that visit.

## 2023-03-17 NOTE — ED NOTES
"Nursing report ED to floor  Orion Zepeda  77 y.o.  male    HPI :   Chief Complaint   Patient presents with    Dizziness       Admitting doctor:   Bryn Gordon MD    Admitting diagnosis:   The primary encounter diagnosis was Ventricular tachyarrhythmia. A diagnosis of Cardiomyopathy, unspecified type (HCC) was also pertinent to this visit.    Code status:   Current Code Status       Date Active Code Status Order ID Comments User Context       Prior            Allergies:   Patient has no known allergies.    Isolation:   No active isolations    Intake and Output  No intake or output data in the 24 hours ending 03/17/23 1559    Weight:       03/17/23  1407   Weight: 106 kg (234 lb)       Most recent vitals:   Vitals:    03/17/23 1357 03/17/23 1407 03/17/23 1500   BP:  137/85 131/78   Pulse: 105  81   Resp: 17  18   Temp: 96.8 °F (36 °C)     TempSrc: Tympanic     SpO2: 96%  95%   Weight:  106 kg (234 lb)    Height:  180.3 cm (71\")        Active LDAs/IV Access:   Lines, Drains & Airways       Active LDAs       Name Placement date Placement time Site Days    Peripheral IV 03/17/23 1437 Left;Posterior Hand 03/17/23  1437  Hand  less than 1                    Labs (abnormal labs have a star):   Labs Reviewed   TROPONIN - Abnormal; Notable for the following components:       Result Value    HS Troponin T 26 (*)     All other components within normal limits    Narrative:     High Sensitive Troponin T Reference Range:  <10.0 ng/L- Negative Female for AMI  <15.0 ng/L- Negative Male for AMI  >=10 - Abnormal Female indicating possible myocardial injury.  >=15 - Abnormal Male indicating possible myocardial injury.   Clinicians would have to utilize clinical acumen, EKG, Troponin, and serial changes to determine if it is an Acute Myocardial Infarction or myocardial injury due to an underlying chronic condition.        CBC WITH AUTO DIFFERENTIAL - Normal   MAGNESIUM - Normal   COMPREHENSIVE METABOLIC PANEL    Narrative:  "    GFR Normal >60  Chronic Kidney Disease <60  Kidney Failure <15    The GFR formula is only valid for adults with stable renal function between ages 18 and 70.   HIGH SENSITIVITIY TROPONIN T 2HR   CBC AND DIFFERENTIAL    Narrative:     The following orders were created for panel order CBC & Differential.  Procedure                               Abnormality         Status                     ---------                               -----------         ------                     CBC Auto Differential[[id number]]        Normal              Final result                 Please view results for these tests on the individual orders.       EKG:   ECG 12 Lead Other; dizziness   Preliminary Result   HEART RATE= 96  bpm   RR Interval= 625  ms   SD Interval= 166  ms   P Horizontal Axis= -36  deg   P Front Axis= 45  deg   QRSD Interval= 177  ms   QT Interval= 435  ms   QRS Axis= -61  deg   T Wave Axis= 111  deg   - ABNORMAL ECG -   Sinus rhythm   LVH with IVCD, LAD and secondary repol abnrm   Prolonged QT interval   Electronically Signed By:    Date and Time of Study: 2023 14:01:46          Meds given in ED:   Medications   potassium chloride (K-DUR,KLOR-CON) ER tablet 40 mEq (has no administration in time range)       Imaging results:  No radiology results for the last day    Ambulatory status:   Ad clement    Social issues:   Social History     Socioeconomic History    Marital status:     Number of children: 2   Tobacco Use    Smoking status: Former     Packs/day: 1.00     Years: 50.00     Pack years: 50.00     Types: Cigarettes     Quit date: 2019     Years since quittin.2    Smokeless tobacco: Never   Vaping Use    Vaping Use: Never used   Substance and Sexual Activity    Alcohol use: Never    Drug use: Yes     Types: Marijuana     Comment: USES 3-4 TIMES WEEK    Sexual activity: Defer       NIH Stroke Scale:         Martha Whitney RN  23 15:59 EDT

## 2023-03-18 LAB
ANION GAP SERPL CALCULATED.3IONS-SCNC: 12.6 MMOL/L (ref 5–15)
BASOPHILS # BLD AUTO: 0.05 10*3/MM3 (ref 0–0.2)
BASOPHILS NFR BLD AUTO: 0.5 % (ref 0–1.5)
BUN SERPL-MCNC: 16 MG/DL (ref 8–23)
BUN/CREAT SERPL: 16.5 (ref 7–25)
CALCIUM SPEC-SCNC: 9.4 MG/DL (ref 8.6–10.5)
CHLORIDE SERPL-SCNC: 108 MMOL/L (ref 98–107)
CO2 SERPL-SCNC: 20.4 MMOL/L (ref 22–29)
CREAT SERPL-MCNC: 0.97 MG/DL (ref 0.76–1.27)
DEPRECATED RDW RBC AUTO: 40.4 FL (ref 37–54)
EGFRCR SERPLBLD CKD-EPI 2021: 80.9 ML/MIN/1.73
EOSINOPHIL # BLD AUTO: 0.38 10*3/MM3 (ref 0–0.4)
EOSINOPHIL NFR BLD AUTO: 3.7 % (ref 0.3–6.2)
ERYTHROCYTE [DISTWIDTH] IN BLOOD BY AUTOMATED COUNT: 12.1 % (ref 12.3–15.4)
GEN 5 2HR TROPONIN T REFLEX: 26 NG/L
GLUCOSE SERPL-MCNC: 102 MG/DL (ref 65–99)
HCT VFR BLD AUTO: 43.8 % (ref 37.5–51)
HGB BLD-MCNC: 15.3 G/DL (ref 13–17.7)
IMM GRANULOCYTES # BLD AUTO: 0.04 10*3/MM3 (ref 0–0.05)
IMM GRANULOCYTES NFR BLD AUTO: 0.4 % (ref 0–0.5)
LYMPHOCYTES # BLD AUTO: 2.79 10*3/MM3 (ref 0.7–3.1)
LYMPHOCYTES NFR BLD AUTO: 27.4 % (ref 19.6–45.3)
MCH RBC QN AUTO: 31.9 PG (ref 26.6–33)
MCHC RBC AUTO-ENTMCNC: 34.9 G/DL (ref 31.5–35.7)
MCV RBC AUTO: 91.4 FL (ref 79–97)
MONOCYTES # BLD AUTO: 0.72 10*3/MM3 (ref 0.1–0.9)
MONOCYTES NFR BLD AUTO: 7.1 % (ref 5–12)
NEUTROPHILS NFR BLD AUTO: 6.21 10*3/MM3 (ref 1.7–7)
NEUTROPHILS NFR BLD AUTO: 60.9 % (ref 42.7–76)
NRBC BLD AUTO-RTO: 0 /100 WBC (ref 0–0.2)
PLATELET # BLD AUTO: 273 10*3/MM3 (ref 140–450)
PMV BLD AUTO: 9.9 FL (ref 6–12)
POTASSIUM SERPL-SCNC: 4.3 MMOL/L (ref 3.5–5.2)
QT INTERVAL: 500 MS
RBC # BLD AUTO: 4.79 10*6/MM3 (ref 4.14–5.8)
SODIUM SERPL-SCNC: 141 MMOL/L (ref 136–145)
TROPONIN T DELTA: 0 NG/L
WBC NRBC COR # BLD: 10.19 10*3/MM3 (ref 3.4–10.8)

## 2023-03-18 PROCEDURE — 83880 ASSAY OF NATRIURETIC PEPTIDE: CPT | Performed by: NURSE PRACTITIONER

## 2023-03-18 PROCEDURE — 93005 ELECTROCARDIOGRAM TRACING: CPT | Performed by: INTERNAL MEDICINE

## 2023-03-18 PROCEDURE — 25010000002 AMIODARONE IN DEXTROSE 5% 360-4.14 MG/200ML-% SOLUTION

## 2023-03-18 PROCEDURE — 99222 1ST HOSP IP/OBS MODERATE 55: CPT | Performed by: INTERNAL MEDICINE

## 2023-03-18 PROCEDURE — 80048 BASIC METABOLIC PNL TOTAL CA: CPT

## 2023-03-18 PROCEDURE — 85025 COMPLETE CBC W/AUTO DIFF WBC: CPT

## 2023-03-18 PROCEDURE — 93010 ELECTROCARDIOGRAM REPORT: CPT | Performed by: INTERNAL MEDICINE

## 2023-03-18 RX ADMIN — AMIODARONE HYDROCHLORIDE 0.5 MG/MIN: 1.8 INJECTION, SOLUTION INTRAVENOUS at 01:18

## 2023-03-18 RX ADMIN — HYDRALAZINE HYDROCHLORIDE 25 MG: 25 TABLET, FILM COATED ORAL at 10:25

## 2023-03-18 RX ADMIN — FUROSEMIDE 40 MG: 40 TABLET ORAL at 10:25

## 2023-03-18 RX ADMIN — AMIODARONE HYDROCHLORIDE 0.5 MG/MIN: 1.8 INJECTION, SOLUTION INTRAVENOUS at 12:28

## 2023-03-18 RX ADMIN — HYDRALAZINE HYDROCHLORIDE 25 MG: 25 TABLET, FILM COATED ORAL at 17:00

## 2023-03-18 RX ADMIN — POTASSIUM CHLORIDE 20 MEQ: 750 TABLET, EXTENDED RELEASE ORAL at 10:26

## 2023-03-18 RX ADMIN — AMIODARONE HYDROCHLORIDE 400 MG: 200 TABLET ORAL at 18:41

## 2023-03-18 RX ADMIN — ATORVASTATIN CALCIUM 40 MG: 20 TABLET, FILM COATED ORAL at 10:25

## 2023-03-18 RX ADMIN — HYDRALAZINE HYDROCHLORIDE 25 MG: 25 TABLET, FILM COATED ORAL at 20:47

## 2023-03-18 NOTE — H&P
Date of Hospital Visit: [unfilled]ENC@  Encounter Provider: Sandhya Cooley MD  Place of Service: [facility] CARDIOLOGY  Patient Name: Orion Zepeda  :1945  Referral Provider: Bryn Gordon MD    Chief complaint    Admitted for medication adjustments and device change    History of Present Illness    This is a 77-year-old man with complete heart block status post dual-chamber pacemaker in 2019, hyperlipidemia.      Patient had a dual-chamber [place] pacemaker placed in 2019.  He RV paces 100% the time.  He had an echocardiogram done in 2022 showing ejection fraction of 45% with mild septal hypokinesis and grade 1 diastolic dysfunction.  He was having increased shortness of breath at that time.  He subsequently underwent echocardiogram which revealed newly decreased EF to 20% with severe LV dilation-reduction from prior echocardiogram.     He was referred for left heart catheterization for further evaluation of newly depressed EF.  Cardiac catheterization done 3/13/2023 showed 10 to 20% proximal to mid left main stenosis, 30% sequential distal LAD stenosis with mild diffuse mid to distal disease, 20% ostial circumflex stenosis, mild diffuse disease throughout the RCA.  Left-sided filling pressures were normal.    Labs on 3/17/2023 show high-sensitivity troponin 26, normal CMP, magnesium 2.2, normal CBC.  ECG shows sinus rhythm with left bundle branch block.  Left bundle branch block was new.  There were alerts from his device showing nonsustained episodes of ventricular tachycardia.  He was admitted for amiodarone loading, hopeful upgrade to CRT-D.    He had some dizziness today when he stood up.  He is still short winded.  He had some mild blurred vision.  Thankfully has had no syncope or falls.  He does not feel his heart racing or skipping.  He has no chest pain or pressure and no orthopnea or PND.    Past Medical History:   Diagnosis Date   • Arthritis    •  Complete heart block (HCC) 2019   • Dislocated shoulder, right, subsequent encounter 2011   • History of staph infection     AFTER SHOULDER SURGERY IN THE 70'S   • Hyperlipidemia    • Nasal septal deviation    • Nasal turbinate hypertrophy    • SOB (shortness of breath) on exertion    • Syncope 2018       Past Surgical History:   Procedure Laterality Date   • CARDIAC CATHETERIZATION N/A 3/13/2023    Procedure: Left Heart Cath;  Surgeon: Rod Ash MD;  Location: [facility] CATH INVASIVE LOCATION;  Service: Cardiology;  Laterality: N/A;   • CARDIAC ELECTROPHYSIOLOGY PROCEDURE N/A 05/05/2019    Procedure: Temporary Pacemaker;  Surgeon: Vonnie Jeffries MD;  Location: [facility] CATH INVASIVE LOCATION;  Service: Cardiology   • CARDIAC ELECTROPHYSIOLOGY PROCEDURE N/A 05/06/2019    Procedure: Pacemaker DC new  [place];  Surgeon: Bryn Gordon MD;  Location: [facility] CATH INVASIVE LOCATION;  Service: Cardiovascular   • CARDIAC ELECTROPHYSIOLOGY PROCEDURE N/A 05/05/2019    Procedure: Temporary Pacemaker- Reposition;  Surgeon: Vonnie Jeffries MD;  Location: [facility] CATH INVASIVE LOCATION;  Service: Cardiology   • DIAGNOSTIC LAPAROSCOPY N/A 09/15/2019    Procedure: DIAGNOSTIC LAPAROSCOPY;  Surgeon: Sonya Sheikh MD;  Location: [facility] OR;  Service: General   • INGUINAL HERNIA REPAIR N/A 09/15/2019    Procedure: left INGUINAL HERNIA REPAIR LAPAROSCOPIC, REDUCTION OF SMALL BOWEL;  Surgeon: Sonya Sheikh MD;  Location: [facility] MAIN OR;  Service: General   • SEPTOPLASTY, RESECTION INFERIOR TURBINATES N/A 6/21/2022    Procedure: SEPTOPLASTY, SUBMUCOUS RESECTION INFERIOR TURBINATES, NASAL ENDOSCOPY and ENDOSCOPIC POLYPECTOMY;  Surgeon: Nj Peterson MD;  Location: [facility] OSC;  Service: ENT;  Laterality: N/A;   • SHOULDER SURGERY Left     in his 20s; ligamentous surgery       Medications Prior to Admission   Medication Sig Dispense Refill Last Dose   • albuterol sulfate  (90 Base) MCG/ACT inhaler  Inhale 2 puffs Every 4 (Four) Hours As Needed for Wheezing or Shortness of Air. 18 g 1 Past Week   • atorvastatin (Lipitor) 40 MG tablet Take 1 tablet by mouth Daily. 90 tablet 1 3/17/2023   • hydrALAZINE (APRESOLINE) 25 MG tablet Take 1 tablet by mouth 3 (Three) Times a Day. 270 tablet 3 3/17/2023   • multivitamin with minerals (SENIOR MULTIVITAMIN PLUS PO) Take 1 tablet by mouth Daily.   3/17/2023   • sodium chloride 0.65 % nasal spray 1 spray into the nostril(s) as directed by provider As Needed for Congestion.   3/17/2023   • furosemide (LASIX) 40 MG tablet Take 1 tablet by mouth Daily. 14 tablet 0    • potassium chloride (K-TAB) 20 MEQ tablet controlled-release ER tablet Take 1 tablet by mouth Daily. 14 tablet 0        Current Meds  Scheduled Meds:amiodarone, 400 mg, Oral, BID With Meals  atorvastatin, 40 mg, Oral, Daily  furosemide, 40 mg, Oral, Daily  hydrALAZINE, 25 mg, Oral, TID  potassium chloride, 20 mEq, Oral, Daily      Continuous Infusions:amiodarone, 0.5 mg/min, Last Rate: 0.5 mg/min (23 1228)      PRN Meds:.•  acetaminophen  •  albuterol  •  ondansetron **OR** ondansetron    Allergies as of 2023   • (No Known Allergies)       Social History     Socioeconomic History   • Marital status:    • Number of children: 2   Tobacco Use   • Smoking status: Former     Packs/day: 1.00     Years: 50.00     Pack years: 50.00     Types: Cigarettes     Quit date: 2019     Years since quittin.2   • Smokeless tobacco: Never   Vaping Use   • Vaping Use: Never used   Substance and Sexual Activity   • Alcohol use: Never   • Drug use: Yes     Types: Marijuana     Comment: USES 3-4 TIMES WEEK   • Sexual activity: Defer       Family History   Problem Relation Age of Onset   • Stroke Mother    • Tuberculosis Mother    • Lung cancer Father    • Rheumatic fever Brother    • Malig Hyperthermia Neg Hx        REVIEW OF SYSTEMS:   12 point ROS was performed and is negative except as outlined in HPI           "  Objective:   Temp:  [96.8 °F (36 °C)-98 °F (36.7 °C)] 98 °F (36.7 °C)  Heart Rate:  [] 88  Resp:  [17-18] 18  BP: (117-137)/(72-86) 137/84  Body mass index is 32.69 kg/m².  Flowsheet Rows    Flowsheet Row First Filed Value   Admission Height 180.3 cm (71\") Documented at 03/17/2023 1407   Admission Weight 106 kg (234 lb) Documented at 03/17/2023 1407        Vitals:    03/18/23 1314   BP: 137/84   Pulse: 88   Resp: 18   Temp: 98 °F (36.7 °C)   SpO2: 96%       General Appearance:    Alert, cooperative, in no acute distress   Head:    Normocephalic, without obvious abnormality, atraumatic   Eyes:            Lids and lashes normal, conjunctivae and sclerae normal, no   icterus, no pallor, corneas clear   Ears:    Ears appear intact with no abnormalities noted   Throat:   No oral lesions, no thrush, oral mucosa moist   Neck:   No adenopathy, supple, trachea midline, no thyromegaly, no   carotid bruit, no JVD   Back:     No kyphosis present, no scoliosis present, no skin lesions, erythema or scars, no tenderness to palpation, range of motion normal   Lungs:     Clear to auscultation,respirations regular, even and unlabored    Heart:    Regular rhythm and normal rate, normal S1 and S2, no murmur, no gallop, no rub, no click   Chest Wall:    No abnormalities observed   Abdomen:     Normal bowel sounds, no masses, no organomegaly, soft, nontender, nondistended, no guarding, no rebound  tenderness   Extremities:   Moves all extremities well, no edema, no cyanosis, no redness   Pulses:   Pulses palpable and equal bilaterally. Normal radial, carotid, dorsalis pedis and posterior tibial pulses bilaterally.    Skin:  Psychiatric:   No bleeding, bruising or rash    Alert and oriented x 3, normal mood and affect                 Lab Review:      Results from last 7 days   Lab Units 03/18/23  0420 03/17/23  1434   SODIUM mmol/L 141 140   POTASSIUM mmol/L 4.3 3.6   CHLORIDE mmol/L 108* 106   CO2 mmol/L 20.4* 23.0   BUN mg/dL 16 " 19   CREATININE mg/dL 0.97 1.00   CALCIUM mg/dL 9.4 9.6   BILIRUBIN mg/dL  --  1.1   ALK PHOS U/L  --  70   ALT (SGPT) U/L  --  25   AST (SGOT) U/L  --  17   GLUCOSE mg/dL 102* 97     Results from last 7 days   Lab Units 03/17/23  2338 03/17/23  1434   HSTROP T ng/L 26* 26*     @LABRCNTbnp@  Results from last 7 days   Lab Units 03/18/23  0420 03/17/23  1434   WBC 10*3/mm3 10.19 9.02   HEMOGLOBIN g/dL 15.3 15.1   HEMATOCRIT % 43.8 42.7   PLATELETS 10*3/mm3 273 247         Results from last 7 days   Lab Units 03/17/23  1434   MAGNESIUM mg/dL 2.2     @LABRCNTIP(chol,trig,hdl,ldl)    I personally viewed and interpreted the patient's EKG/Telemetry data  )  Patient Active Problem List   Diagnosis   • Third degree heart block (HCC)   • Syncope   • Shortness of breath   • Ventricular tachyarrhythmia     Assessment and Plan:    1. Dilated cardiomyopathy, new, likely due to RV pacing.  2. Nonsustained ventricular tachycardia  3. Mild CAD  4. Complete AV block requiring pacemaker-done in 2019  5. Hyperlipidemia      We will change to p.o. amiodarone.  We will follow.  Device upgrade next week.  He does have dizziness and blurred vision.  I do think probably most of this is due to his cardiomyopathy and the fact that he does have cataracts but the family is worried that there maybe something abnormal intracranial, will get a noncontrasted CT head.    Sandhya Cooley MD  03/18/23  13:29 EDT.  Time spent in reviewing chart, discussion and examination:

## 2023-03-18 NOTE — PLAN OF CARE
Goal Outcome Evaluation:   Vital signs stable, heart rhythm paced. New onset intermittent dizziness, please see flowsheets.  Up with standby assist, short of air on exertion. IV amiodarone drip stopped, oral amiodarone started.  Estimated discharge date unknown.

## 2023-03-18 NOTE — PLAN OF CARE
Goal Outcome Evaluation:  Plan of Care Reviewed With: patient           Outcome Evaluation: VSS, no c/o pain. Pt up ad clement. Amiodarone drip at 16.67ml/hr. Will CTM, safety maintained.

## 2023-03-19 ENCOUNTER — APPOINTMENT (OUTPATIENT)
Dept: CT IMAGING | Facility: HOSPITAL | Age: 78
DRG: 227 | End: 2023-03-19
Payer: MEDICARE

## 2023-03-19 LAB — NT-PROBNP SERPL-MCNC: 1060 PG/ML (ref 0–1800)

## 2023-03-19 PROCEDURE — 70450 CT HEAD/BRAIN W/O DYE: CPT

## 2023-03-19 PROCEDURE — 99232 SBSQ HOSP IP/OBS MODERATE 35: CPT | Performed by: NURSE PRACTITIONER

## 2023-03-19 RX ADMIN — HYDRALAZINE HYDROCHLORIDE 25 MG: 25 TABLET, FILM COATED ORAL at 21:20

## 2023-03-19 RX ADMIN — FUROSEMIDE 40 MG: 40 TABLET ORAL at 09:35

## 2023-03-19 RX ADMIN — ATORVASTATIN CALCIUM 40 MG: 20 TABLET, FILM COATED ORAL at 09:32

## 2023-03-19 RX ADMIN — HYDRALAZINE HYDROCHLORIDE 25 MG: 25 TABLET, FILM COATED ORAL at 16:15

## 2023-03-19 RX ADMIN — AMIODARONE HYDROCHLORIDE 400 MG: 200 TABLET ORAL at 09:34

## 2023-03-19 RX ADMIN — HYDRALAZINE HYDROCHLORIDE 25 MG: 25 TABLET, FILM COATED ORAL at 09:35

## 2023-03-19 RX ADMIN — POTASSIUM CHLORIDE 20 MEQ: 750 TABLET, EXTENDED RELEASE ORAL at 09:37

## 2023-03-19 RX ADMIN — AMIODARONE HYDROCHLORIDE 400 MG: 200 TABLET ORAL at 17:47

## 2023-03-19 NOTE — PROGRESS NOTES
Hospital Follow Up    LOS:  LOS: 1 day   Patient Name: Orion Zepeda  Age/Sex: 77 y.o. male  : 1945  MRN: [id number]    Day of Service: 23   Length of Stay: 1  Encounter Provider: STEVE Quarles  Place of Service: [facility] CARDIOLOGY  Patient Care Team:  Bradley Retana DO as PCP - General (Internal Medicine)  Bryn Gordon MD as Consulting Physician (Cardiology)    Subjective:     Chief Complaint: VT, CM    Interval History: short of breath with exertion and dizziness    Objective:     Objective:  Temp:  [97.9 °F (36.6 °C)-98.3 °F (36.8 °C)] 98 °F (36.7 °C)  Heart Rate:  [67-89] 89  Resp:  [16-18] 18  BP: (119-131)/(67-90) 131/90     Intake/Output Summary (Last 24 hours) at 3/19/2023 1424  Last data filed at 3/19/2023 0230  Gross per 24 hour   Intake 240 ml   Output 475 ml   Net -235 ml     Body mass index is 32.69 kg/m².      23  1407 23  1734   Weight: 106 kg (234 lb) 106 kg (234 lb 6.4 oz)     Weight change:     Physical Exam:   General Appearance:    Awake alert and oriented in no acute distress.   Color:  Skin:  Neuro:  HEENT:    Lungs:     Pink  Warm and dry  No focal, motor or sensory deficits  Neck supple, pupils equal, round and reactive. No JVD, No Bruit  Clear to auscultation,respirations regular, even and                  unlabored    Heart:    Regular rate and rhythm, S1 and S2, no murmur, no gallop, no rub. No edema, DP/PT pulses are 2+   Chest Wall:    No abnormalities observed   Abdomen:     Normal bowel sounds, no masses, no organomegaly, soft        non-tender, non-distended, no guarding, no ascites noted   Extremities:   Moves all extremities well, no edema, no cyanosis, no redness       Lab Review:   Results from last 7 days   Lab Units 23  0420 23  1434   SODIUM mmol/L 141 140   POTASSIUM mmol/L 4.3 3.6   CHLORIDE mmol/L 108* 106   CO2 mmol/L 20.4* 23.0   BUN mg/dL 16 19   CREATININE mg/dL 0.97 1.00    GLUCOSE mg/dL 102* 97   CALCIUM mg/dL 9.4 9.6   AST (SGOT) U/L  --  17   ALT (SGPT) U/L  --  25     Results from last 7 days   Lab Units 03/17/23  2338 03/17/23  1434   HSTROP T ng/L 26* 26*     Results from last 7 days   Lab Units 03/18/23  0420 03/17/23  1434   WBC 10*3/mm3 10.19 9.02   HEMOGLOBIN g/dL 15.3 15.1   HEMATOCRIT % 43.8 42.7   PLATELETS 10*3/mm3 273 247         Results from last 7 days   Lab Units 03/17/23  1434   MAGNESIUM mg/dL 2.2           Invalid input(s): LDLCALC  Results from last 7 days   Lab Units 03/18/23  0420   PROBNP pg/mL 1,060.0         I reviewed the patient's new clinical results.  I personally viewed and interpreted the patient's EKG  Current Medications:   Scheduled Meds:amiodarone, 400 mg, Oral, BID With Meals  atorvastatin, 40 mg, Oral, Daily  furosemide, 40 mg, Oral, Daily  hydrALAZINE, 25 mg, Oral, TID  potassium chloride, 20 mEq, Oral, Daily      Continuous Infusions:     Allergies:  No Known Allergies    Assessment:       Ventricular tachyarrhythmia    1.  Dilated cardiomyopathy likely due to RV pacing    2.  Nonsustained ventricular tachycardia loading with amiodarone- now on PO    3.  Mild coronary disease    4.  History of permanent pacemaker due to complete AV block in 2019- upgrade to ICD this week    5.  Dizziness head CT pending    6.  Hyperlipidemia    Plan:      Has not had CT of the head that was ordered yesterday.  Talked with nurse he is going to call and get it taken care of today.    STEVE Quarles  03/19/23  14:24 EDT  Electronically signed by STEVE Quarles, 03/19/23, 2:24 PM EDT.

## 2023-03-19 NOTE — PLAN OF CARE
Goal Outcome Evaluation:  Plan of Care Reviewed With: patient           Outcome Evaluation: VSS, no c/o pain. Pt appeared to sleep well between care. Up with SB assist, but no c/o dizziness on the shift. CT still needs to be done. Will CTM, safety maintained.

## 2023-03-20 PROBLEM — I42.9 CARDIOMYOPATHY (HCC): Status: ACTIVE | Noted: 2023-03-17

## 2023-03-20 PROBLEM — I25.5 ISCHEMIC CARDIOMYOPATHY: Status: ACTIVE | Noted: 2023-03-17

## 2023-03-20 LAB
ANION GAP SERPL CALCULATED.3IONS-SCNC: 12 MMOL/L (ref 5–15)
BUN SERPL-MCNC: 22 MG/DL (ref 8–23)
BUN/CREAT SERPL: 18.8 (ref 7–25)
CALCIUM SPEC-SCNC: 9.1 MG/DL (ref 8.6–10.5)
CHLORIDE SERPL-SCNC: 105 MMOL/L (ref 98–107)
CO2 SERPL-SCNC: 23 MMOL/L (ref 22–29)
CREAT SERPL-MCNC: 1.17 MG/DL (ref 0.76–1.27)
DEPRECATED RDW RBC AUTO: 43.9 FL (ref 37–54)
EGFRCR SERPLBLD CKD-EPI 2021: 64.6 ML/MIN/1.73
ERYTHROCYTE [DISTWIDTH] IN BLOOD BY AUTOMATED COUNT: 12.8 % (ref 12.3–15.4)
GLUCOSE SERPL-MCNC: 95 MG/DL (ref 65–99)
HCT VFR BLD AUTO: 44 % (ref 37.5–51)
HGB BLD-MCNC: 15 G/DL (ref 13–17.7)
MCH RBC QN AUTO: 32.2 PG (ref 26.6–33)
MCHC RBC AUTO-ENTMCNC: 34.1 G/DL (ref 31.5–35.7)
MCV RBC AUTO: 94.4 FL (ref 79–97)
PLATELET # BLD AUTO: 256 10*3/MM3 (ref 140–450)
PMV BLD AUTO: 9.6 FL (ref 6–12)
POTASSIUM SERPL-SCNC: 4 MMOL/L (ref 3.5–5.2)
RBC # BLD AUTO: 4.66 10*6/MM3 (ref 4.14–5.8)
SODIUM SERPL-SCNC: 140 MMOL/L (ref 136–145)
WBC NRBC COR # BLD: 10.7 10*3/MM3 (ref 3.4–10.8)

## 2023-03-20 PROCEDURE — 99233 SBSQ HOSP IP/OBS HIGH 50: CPT

## 2023-03-20 PROCEDURE — 80048 BASIC METABOLIC PNL TOTAL CA: CPT

## 2023-03-20 PROCEDURE — 85027 COMPLETE CBC AUTOMATED: CPT

## 2023-03-20 RX ORDER — CARVEDILOL 3.12 MG/1
3.12 TABLET ORAL 2 TIMES DAILY WITH MEALS
Status: DISCONTINUED | OUTPATIENT
Start: 2023-03-20 | End: 2023-03-24

## 2023-03-20 RX ORDER — LOSARTAN POTASSIUM 50 MG/1
50 TABLET ORAL
Status: DISCONTINUED | OUTPATIENT
Start: 2023-03-21 | End: 2023-03-21

## 2023-03-20 RX ADMIN — ATORVASTATIN CALCIUM 40 MG: 20 TABLET, FILM COATED ORAL at 09:03

## 2023-03-20 RX ADMIN — POTASSIUM CHLORIDE 20 MEQ: 750 TABLET, EXTENDED RELEASE ORAL at 09:03

## 2023-03-20 RX ADMIN — HYDRALAZINE HYDROCHLORIDE 25 MG: 25 TABLET, FILM COATED ORAL at 09:03

## 2023-03-20 RX ADMIN — AMIODARONE HYDROCHLORIDE 400 MG: 200 TABLET ORAL at 18:34

## 2023-03-20 RX ADMIN — AMIODARONE HYDROCHLORIDE 400 MG: 200 TABLET ORAL at 09:03

## 2023-03-20 RX ADMIN — FUROSEMIDE 40 MG: 40 TABLET ORAL at 09:03

## 2023-03-20 RX ADMIN — CARVEDILOL 3.12 MG: 3.12 TABLET, FILM COATED ORAL at 18:34

## 2023-03-20 NOTE — PLAN OF CARE
Goal Outcome Evaluation:   Vital signs stable.  Up by self in room, safety maintained.  Some shortnes of air on exertion; no reports of dizziness this shift.  On amiodarone oral dose.  AV paced, roughly 50% of the time.    Discharge disposition: probably home, date unknown.

## 2023-03-20 NOTE — PLAN OF CARE
Goal Outcome Evaluation:  Plan of Care Reviewed With: patient      Outcome Evaluation: Patient on room air. A/O x4. Up ad clement.  Surgery scheduled for this coming Thursday, 03/23. Consent for device extraction and reimplantation of biventricular ICD signed, and in chart. Consent for blood product adm signed, and in chart.No c/o of pain or dizziness this shift. VSS. Safety maintained.

## 2023-03-20 NOTE — PLAN OF CARE
Goal Outcome Evaluation:  Plan of Care Reviewed With: patient        Progress: no change  Outcome Evaluation: Placed on 2L N/C while sleeping to keep sats >92%,  Pt was 87% -88% on R/A. Telemetry VPaced. Pt has denied dizziness or chest pain. Independent to BR. Instructed pt to call for any dizziness prior to getting OOB unassisted. Pt verbalizes understanding. Safety maintained.

## 2023-03-20 NOTE — PROGRESS NOTES
Electrophysiology Follow-Up Note      Patient Name: Orion Zepeda  Age/Sex: 77 y.o. male  : 1945  MRN: [id number]      Day of Service: 23       Chief Complaint/Follow-up: HFrEF,     S: doing well this morning, some dizziness over the weekend but better today. No acute events overnight. Now on PO amiodarone.       Temp:  [97.7 °F (36.5 °C)-98.3 °F (36.8 °C)] 97.7 °F (36.5 °C)  Heart Rate:  [73-84] 73  Resp:  [18] 18  BP: (105-129)/(60-74) 107/73     PHYSICAL EXAM:    General Appearance: No acute distress, well developed and well nourished.   Eyes: Conjunctiva and lids: No erythema, swelling, or discharge. Sclera non-icteric.   HENT: Atraumatic, normocephalic. External eyes, ears, and nose normal.   Respiratory: No signs of respiratory distress. Respiration rhythm and depth normal.   Clear to auscultation. No rales, crackles, rhonchi, or wheezing auscultated.   Cardiovascular:  Heart Rate and Rhythm: Normal, Heart Sounds: Normal S1 and S2. No S3 or S4 noted.  Gastrointestinal:  Abdomen soft, non-distended, non-tender.  Musculoskeletal: Normal movement of extremities  Skin: Warm and dry.   Psychiatric: Patient alert and oriented to person, place, and time. Speech and behavior appropriate. Normal mood and affect.      Results from last 7 days   Lab Units 23  0420 23  1434   SODIUM mmol/L 141 140   POTASSIUM mmol/L 4.3 3.6   CHLORIDE mmol/L 108* 106   CO2 mmol/L 20.4* 23.0   BUN mg/dL 16 19   CREATININE mg/dL 0.97 1.00   GLUCOSE mg/dL 102* 97   CALCIUM mg/dL 9.4 9.6     Results from last 7 days   Lab Units 23  0420 23  1434   WBC 10*3/mm3 10.19 9.02   HEMOGLOBIN g/dL 15.3 15.1   HEMATOCRIT % 43.8 42.7   PLATELETS 10*3/mm3 273 247         Results from last 7 days   Lab Units 23  2338 23  1434   HSTROP T ng/L 26* 26*               Current Medications:   Scheduled Meds:amiodarone, 400 mg, Oral, BID With Meals  atorvastatin, 40 mg, Oral, Daily  furosemide, 40 mg,  "Oral, Daily  hydrALAZINE, 25 mg, Oral, TID  potassium chloride, 20 mEq, Oral, Daily            Ventricular tachyarrhythmia       Plan:   1-3. CHB--s/p DC PPM, NICM (EF-20%)-- cath last week showing no significant CAD. With RV pacing of 100% and new non-ischemic cardiomyopathy. 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 Plan is for device extraction in OR and reimplantation of biventricular ICD.     4. VT-- admitted for sustained VT over the weekend. Loaded with amiodaorne and now on PO amiodarone. Now further episodes since admission.     5. Dizziness--- CT negative for any acute findings. Has had some hypotension at times. Dizziness improved today.         -- we discussed potential risk and benefits; we discussed adding LV lead with current system and upgrading to CRT-P. Given new findings of VT on his pacemaker, this would not protect him from further events and SCD.     -We recommended extraction of current RV lead and implantation of defibrillator lead and addition of LV lead, which would be done in the OR. After discussion he acknowledges and is agreeable to proceed.    Shared decision making for AICD implantation has been discussed with patient.  I have reviewed [employer] [facility] [facility] [employer] \"a decision made for implantable cardioverter defibrillators [ICD] \"  Risks and benefits of procedure discussed with patient.      We will plan for extraction/reimplant later this week with day/time TBD.          STEVE Frankel  03/20/23  07:52 EDT  "

## 2023-03-20 NOTE — CASE MANAGEMENT/SOCIAL WORK
Discharge Planning Assessment  [facility]     Patient Name: Orion Zepeda  MRN: [id number]  Today's Date: 3/20/2023    Admit Date: 3/17/2023    Plan: Return home via sons. Denies needs   Discharge Needs Assessment     Row Name 03/20/23 1600       Living Environment    People in Home alone    Current Living Arrangements home    Primary Care Provided by self    Provides Primary Care For no one    Family Caregiver if Needed child(ines), adult    Family Caregiver Names sons Kane and Bradley    Quality of Family Relationships involved;helpful    Able to Return to Prior Arrangements yes       Resource/Environmental Concerns    Resource/Environmental Concerns none       Transition Planning    Patient/Family Anticipates Transition to home    Patient/Family Anticipated Services at Transition none    Transportation Anticipated family or friend will provide       Discharge Needs Assessment    Readmission Within the Last 30 Days no previous admission in last 30 days    Equipment Currently Used at Home none    Concerns to be Addressed denies needs/concerns at this time;no discharge needs identified    Equipment Needed After Discharge none               Discharge Plan     Row Name 03/20/23 1600       Plan    Plan Return home via sons. Denies needs    Patient/Family in Agreement with Plan yes    Plan Comments CCP met with patient at bedside. Introduced self and explained role. Patient lives alone. He is IADL and transports himself to appointments. Patient fills prescriptions at [facility] on [address], sees Dr. Retana for PCP, and was encouraged to provide a copy of his AD/LW documents to the hospital to place on file. Patient denies any DME, HH, or ALEX history and plans to return home via family at discharge. He denies any discharge planning needs at this time. Celi GILLETTE RN/CCP              Continued Care and Services - Admitted Since 3/17/2023    Coordination has not been started for this encounter.          Demographic Summary      Row Name 03/20/23 1550       General Information    Admission Type inpatient    Arrived From home    Required Notices Provided Important Message from Medicare    Referral Source admission list    Reason for Consult discharge planning               Functional Status     Row Name 03/20/23 1559       Functional Status    Usual Activity Tolerance good    Current Activity Tolerance moderate       Functional Status, IADL    Medications independent    Meal Preparation independent    Housekeeping independent    Laundry independent    Shopping independent               Psychosocial    No documentation.                Abuse/Neglect    No documentation.                Legal    No documentation.                Substance Abuse    No documentation.                Patient Forms    No documentation.                   Aline Arguelles

## 2023-03-21 LAB
ABO GROUP BLD: NORMAL
ANION GAP SERPL CALCULATED.3IONS-SCNC: 8 MMOL/L (ref 5–15)
BLD GP AB SCN SERPL QL: NEGATIVE
BUN SERPL-MCNC: 23 MG/DL (ref 8–23)
BUN/CREAT SERPL: 18.7 (ref 7–25)
CALCIUM SPEC-SCNC: 9.2 MG/DL (ref 8.6–10.5)
CHLORIDE SERPL-SCNC: 106 MMOL/L (ref 98–107)
CO2 SERPL-SCNC: 25 MMOL/L (ref 22–29)
CREAT SERPL-MCNC: 1.23 MG/DL (ref 0.76–1.27)
DEPRECATED RDW RBC AUTO: 42.6 FL (ref 37–54)
EGFRCR SERPLBLD CKD-EPI 2021: 60.8 ML/MIN/1.73
ERYTHROCYTE [DISTWIDTH] IN BLOOD BY AUTOMATED COUNT: 12.5 % (ref 12.3–15.4)
GLUCOSE BLDC GLUCOMTR-MCNC: 95 MG/DL (ref 70–130)
GLUCOSE SERPL-MCNC: 104 MG/DL (ref 65–99)
HCT VFR BLD AUTO: 43.7 % (ref 37.5–51)
HGB BLD-MCNC: 15.1 G/DL (ref 13–17.7)
MCH RBC QN AUTO: 32.3 PG (ref 26.6–33)
MCHC RBC AUTO-ENTMCNC: 34.6 G/DL (ref 31.5–35.7)
MCV RBC AUTO: 93.4 FL (ref 79–97)
PLATELET # BLD AUTO: 267 10*3/MM3 (ref 140–450)
PMV BLD AUTO: 9.6 FL (ref 6–12)
POTASSIUM SERPL-SCNC: 4 MMOL/L (ref 3.5–5.2)
RBC # BLD AUTO: 4.68 10*6/MM3 (ref 4.14–5.8)
RH BLD: POSITIVE
SODIUM SERPL-SCNC: 139 MMOL/L (ref 136–145)
T&S EXPIRATION DATE: NORMAL
WBC NRBC COR # BLD: 10.32 10*3/MM3 (ref 3.4–10.8)

## 2023-03-21 PROCEDURE — 86850 RBC ANTIBODY SCREEN: CPT

## 2023-03-21 PROCEDURE — 99232 SBSQ HOSP IP/OBS MODERATE 35: CPT

## 2023-03-21 PROCEDURE — 86900 BLOOD TYPING SEROLOGIC ABO: CPT

## 2023-03-21 PROCEDURE — 86923 COMPATIBILITY TEST ELECTRIC: CPT

## 2023-03-21 PROCEDURE — 86901 BLOOD TYPING SEROLOGIC RH(D): CPT

## 2023-03-21 PROCEDURE — 85027 COMPLETE CBC AUTOMATED: CPT

## 2023-03-21 PROCEDURE — 82962 GLUCOSE BLOOD TEST: CPT

## 2023-03-21 PROCEDURE — 80048 BASIC METABOLIC PNL TOTAL CA: CPT

## 2023-03-21 RX ORDER — LOSARTAN POTASSIUM 25 MG/1
25 TABLET ORAL
Status: DISCONTINUED | OUTPATIENT
Start: 2023-03-21 | End: 2023-03-24 | Stop reason: HOSPADM

## 2023-03-21 RX ADMIN — ATORVASTATIN CALCIUM 40 MG: 20 TABLET, FILM COATED ORAL at 08:01

## 2023-03-21 RX ADMIN — AMIODARONE HYDROCHLORIDE 400 MG: 200 TABLET ORAL at 07:58

## 2023-03-21 RX ADMIN — FUROSEMIDE 40 MG: 40 TABLET ORAL at 08:01

## 2023-03-21 RX ADMIN — CARVEDILOL 3.12 MG: 3.12 TABLET, FILM COATED ORAL at 07:58

## 2023-03-21 RX ADMIN — CARVEDILOL 3.12 MG: 3.12 TABLET, FILM COATED ORAL at 17:24

## 2023-03-21 RX ADMIN — AMIODARONE HYDROCHLORIDE 400 MG: 200 TABLET ORAL at 17:24

## 2023-03-21 RX ADMIN — LOSARTAN POTASSIUM 25 MG: 50 TABLET, FILM COATED ORAL at 10:00

## 2023-03-21 NOTE — PROGRESS NOTES
"          Electrophysiology Follow-Up Note      Patient Name: Orion Zepeda  Age/Sex: 77 y.o. male  : 1945  MRN: [id number]      Day of Service: 23       Chief Complaint/Follow-up: HFrEF, device upgrade, VT    S: had some dizziness this morning and felt \"off\" but this resolved. No further complaints.       Temp:  [97.9 °F (36.6 °C)-98.2 °F (36.8 °C)] 97.9 °F (36.6 °C)  Heart Rate:  [72-78] 72  Resp:  [18] 18  BP: (108-128)/(62-74) 108/70     PHYSICAL EXAM:    General Appearance: No acute distress, well developed and well nourished.   Eyes: Conjunctiva and lids: No erythema, swelling, or discharge. Sclera non-icteric.   HENT: Atraumatic, normocephalic. External eyes, ears, and nose normal.   Respiratory: No signs of respiratory distress. Respiration rhythm and depth normal.   Clear to auscultation. No rales, crackles, rhonchi, or wheezing auscultated.   Cardiovascular:  Heart Rate and Rhythm: Normal, Heart Sounds: Normal S1 and S2. No S3 or S4 noted.  Murmurs: No murmurs noted. No rubs, thrills, or gallops.   Lower Extremities: No edema noted.  Gastrointestinal:  Abdomen soft, non-distended, non-tender.  Musculoskeletal: Normal movement of extremities  Skin: Warm and dry.   Psychiatric: Patient alert and oriented to person, place, and time. Speech and behavior appropriate. Normal mood and affect.       Results from last 7 days   Lab Units 23  0301 23  1157 23  0420   SODIUM mmol/L 139 140 141   POTASSIUM mmol/L 4.0 4.0 4.3   CHLORIDE mmol/L 106 105 108*   CO2 mmol/L 25.0 23.0 20.4*   BUN mg/dL 23 22 16   CREATININE mg/dL 1.23 1.17 0.97   GLUCOSE mg/dL 104* 95 102*   CALCIUM mg/dL 9.2 9.1 9.4     Results from last 7 days   Lab Units 23  0301 23  1157 23  0420   WBC 10*3/mm3 10.32 10.70 10.19   HEMOGLOBIN g/dL 15.1 15.0 15.3   HEMATOCRIT % 43.7 44.0 43.8   PLATELETS 10*3/mm3 267 256 273         Results from last 7 days   Lab Units 23  2338 23  1434 "   HSTROP T ng/L 26* 26*               Current Medications:   Scheduled Meds:amiodarone, 400 mg, Oral, BID With Meals  atorvastatin, 40 mg, Oral, Daily  carvedilol, 3.125 mg, Oral, BID With Meals  furosemide, 40 mg, Oral, Daily  losartan, 25 mg, Oral, Q24H            Ventricular tachyarrhythmia    Third degree heart block (HCC)    Syncope    Cardiomyopathy (HCC)    Ischemic cardiomyopathy       Plan:   1. VT-- loaded with IV amiodarone. Now on PO 400mg BID. No further arrhythmia this admission.     2. Cardiomyopathy--presumed pacemaker mediated. High RV pacing percentages. Recent cath showed no obstructive CAD. Hydralazine DC. Started on carvedilol and losartan for GDMT.     3. RENARD-- kidney function stable. Creatinine 1.23.     4. DC pacemaker-- plan for device extraction and upgrade to CRT-D. Scheduled for Thursday.         STEVE Frankel  03/21/23  07:53 EDT

## 2023-03-21 NOTE — NURSING NOTE
"At 1055 pt called this nurse to room-said he just didn't feel right-he felt hot and a lit queezy. B/P 119/79  Heart rate 66-resp 20 , temp 97.7 orally and blood glucose 95.   Pt felt better after 10 minutes-queezy feeling was gone but still feels a little\"off\" from yesterday  Pt presently sitting up in a chair eating breakfast  "

## 2023-03-21 NOTE — PLAN OF CARE
Goal Outcome Evaluation:  Plan of Care Reviewed With: patient        Progress: no change  Outcome Evaluation: Pt with no complaints this shift. Sx scheduled for Thursday. Remained on RA throughout shift. No complaints of dizziness. Continue to monitor.

## 2023-03-21 NOTE — PLAN OF CARE
Goal Outcome Evaluation:  Plan of Care Reviewed With: patient  VSS  No further dizziness or nausea  Up in the chair the majority of shift  Plan remains for pacemaker change on Thursday  Safety maintained this shift        Progress: improving

## 2023-03-22 LAB
ANION GAP SERPL CALCULATED.3IONS-SCNC: 9 MMOL/L (ref 5–15)
BH BB BLOOD EXPIRATION DATE: NORMAL
BH BB BLOOD EXPIRATION DATE: NORMAL
BH BB BLOOD TYPE BARCODE: 5100
BH BB BLOOD TYPE BARCODE: 5100
BH BB DISPENSE STATUS: NORMAL
BH BB DISPENSE STATUS: NORMAL
BH BB PRODUCT CODE: NORMAL
BH BB PRODUCT CODE: NORMAL
BH BB UNIT NUMBER: NORMAL
BH BB UNIT NUMBER: NORMAL
BUN SERPL-MCNC: 23 MG/DL (ref 8–23)
BUN/CREAT SERPL: 19.2 (ref 7–25)
CALCIUM SPEC-SCNC: 9 MG/DL (ref 8.6–10.5)
CHLORIDE SERPL-SCNC: 104 MMOL/L (ref 98–107)
CO2 SERPL-SCNC: 26 MMOL/L (ref 22–29)
CREAT SERPL-MCNC: 1.2 MG/DL (ref 0.76–1.27)
CROSSMATCH INTERPRETATION: NORMAL
CROSSMATCH INTERPRETATION: NORMAL
DEPRECATED RDW RBC AUTO: 42 FL (ref 37–54)
EGFRCR SERPLBLD CKD-EPI 2021: 62.7 ML/MIN/1.73
ERYTHROCYTE [DISTWIDTH] IN BLOOD BY AUTOMATED COUNT: 12.1 % (ref 12.3–15.4)
GLUCOSE SERPL-MCNC: 104 MG/DL (ref 65–99)
HCT VFR BLD AUTO: 42.4 % (ref 37.5–51)
HGB BLD-MCNC: 14.6 G/DL (ref 13–17.7)
MCH RBC QN AUTO: 32.4 PG (ref 26.6–33)
MCHC RBC AUTO-ENTMCNC: 34.4 G/DL (ref 31.5–35.7)
MCV RBC AUTO: 94.2 FL (ref 79–97)
PLATELET # BLD AUTO: 249 10*3/MM3 (ref 140–450)
PMV BLD AUTO: 9.3 FL (ref 6–12)
POTASSIUM SERPL-SCNC: 4.2 MMOL/L (ref 3.5–5.2)
RBC # BLD AUTO: 4.5 10*6/MM3 (ref 4.14–5.8)
SODIUM SERPL-SCNC: 139 MMOL/L (ref 136–145)
UNIT  ABO: NORMAL
UNIT  ABO: NORMAL
UNIT  RH: NORMAL
UNIT  RH: NORMAL
WBC NRBC COR # BLD: 9.32 10*3/MM3 (ref 3.4–10.8)

## 2023-03-22 PROCEDURE — 80048 BASIC METABOLIC PNL TOTAL CA: CPT

## 2023-03-22 PROCEDURE — 85027 COMPLETE CBC AUTOMATED: CPT

## 2023-03-22 PROCEDURE — 99232 SBSQ HOSP IP/OBS MODERATE 35: CPT

## 2023-03-22 RX ADMIN — AMIODARONE HYDROCHLORIDE 400 MG: 200 TABLET ORAL at 08:49

## 2023-03-22 RX ADMIN — CARVEDILOL 3.12 MG: 3.12 TABLET, FILM COATED ORAL at 17:28

## 2023-03-22 RX ADMIN — ATORVASTATIN CALCIUM 40 MG: 20 TABLET, FILM COATED ORAL at 08:50

## 2023-03-22 RX ADMIN — CARVEDILOL 3.12 MG: 3.12 TABLET, FILM COATED ORAL at 08:49

## 2023-03-22 RX ADMIN — FUROSEMIDE 40 MG: 40 TABLET ORAL at 08:48

## 2023-03-22 RX ADMIN — LOSARTAN POTASSIUM 25 MG: 50 TABLET, FILM COATED ORAL at 08:48

## 2023-03-22 RX ADMIN — AMIODARONE HYDROCHLORIDE 400 MG: 200 TABLET ORAL at 17:28

## 2023-03-22 NOTE — CASE MANAGEMENT/SOCIAL WORK
Continued Stay Note  [facility]     Patient Name: Orion Zepeda  MRN: [id number]  Today's Date: 3/22/2023    Admit Date: 3/17/2023    Plan: Return home via sons. Denies needs   Discharge Plan     Row Name 03/22/23 1546       Plan    Plan Return home via sons. Denies needs    Patient/Family in Agreement with Plan yes    Plan Comments Patient confirms plans to return home via one of his sons at discharge. He denies any discharge planning needs at this time. Celi GILLETTE, RN/CCP               Discharge Codes    No documentation.               Expected Discharge Date and Time     Expected Discharge Date Expected Discharge Time    Mar 24, 2023             Aline Arguelles

## 2023-03-22 NOTE — PROGRESS NOTES
Electrophysiology Follow-Up Note      Patient Name: Orion Zepeda  Age/Sex: 77 y.o. male  : 1945  MRN: [id number]      Day of Service: 23       Chief Complaint/Follow-up: HFrEF, device upgrade, VT    S: doing okay this morning. Sitting up at bedside eating breakfast. No acute events overnight. No further dizziness.       Temp:  [97.7 °F (36.5 °C)-98.8 °F (37.1 °C)] 98.8 °F (37.1 °C)  Heart Rate:  [63-74] 74  Resp:  [16-20] 16  BP: (108-121)/(70-92) 109/81     PHYSICAL EXAM:    General Appearance: No acute distress, well developed and well nourished.   Eyes: Conjunctiva and lids: No erythema, swelling, or discharge. Sclera non-icteric.   HENT: Atraumatic, normocephalic. External eyes, ears, and nose normal.   Respiratory: No signs of respiratory distress. Respiration rhythm and depth normal.   Clear to auscultation. No rales, crackles, rhonchi, or wheezing auscultated.   Cardiovascular:  Heart Rate and Rhythm: Paced, Heart Sounds: Normal S1 and S2. No S3 or S4 noted.  Gastrointestinal:  Abdomen soft, non-distended, non-tender.  Musculoskeletal: Normal movement of extremities  Skin: Warm and dry.   Psychiatric: Patient alert and oriented to person, place, and time. Speech and behavior appropriate. Normal mood and affect.       ECG/TELE:           Results from last 7 days   Lab Units 23  0438 23  03023  1157   SODIUM mmol/L 139 139 140   POTASSIUM mmol/L 4.2 4.0 4.0   CHLORIDE mmol/L 104 106 105   CO2 mmol/L 26.0 25.0 23.0   BUN mg/dL    CREATININE mg/dL 1.20 1.23 1.17   GLUCOSE mg/dL 104* 104* 95   CALCIUM mg/dL 9.0 9.2 9.1     Results from last 7 days   Lab Units 23  0438 23  0301 23  1157   WBC 10*3/mm3 9.32 10.32 10.70   HEMOGLOBIN g/dL 14.6 15.1 15.0   HEMATOCRIT % 42.4 43.7 44.0   PLATELETS 10*3/mm3 249 267 256         Results from last 7 days   Lab Units 23  2338 23  1434   HSTROP T ng/L 26* 26*               Current Medications:    Scheduled Meds:amiodarone, 400 mg, Oral, BID With Meals  atorvastatin, 40 mg, Oral, Daily  carvedilol, 3.125 mg, Oral, BID With Meals  furosemide, 40 mg, Oral, Daily  losartan, 25 mg, Oral, Q24H            Ventricular tachyarrhythmia    Third degree heart block (HCC)    Syncope    Cardiomyopathy (HCC)    Ischemic cardiomyopathy       Plan:   1. VT-- loaded with IV amiodarone. Now on PO 400mg BID. Will decrease to 200mg daily at discharge. No further arrhythmia this admission.      2. Cardiomyopathy--presumed pacemaker mediated. High RV pacing percentages. Recent cath showed no obstructive CAD. Hydralazine DC. Started on carvedilol and losartan for GDMT. Tolerating BB and ARB well, VSS.  If dizziness persists may need additional workup post device upgrade, suspect it will improve (vitals have been stable, labs unremarkable, CT negative, no evidence of arrhythmia).     3. RENARD-- kidney function stable. Creatinine 1.20.      4. DC pacemaker-- plan for device extraction and upgrade to CRT-D. Scheduled for tomorrow. Patient will be NPO after midnight.                 STEVE Frankel  03/22/23  07:24 EDT

## 2023-03-22 NOTE — PLAN OF CARE
Goal Outcome Evaluation:   Vital signs stable.  Up independently, safety maintained. Denies dizziness this shift. On room air throughout shift. Scheduled for procedure 3/23/23. Possible discharge 3/24/23.

## 2023-03-22 NOTE — PLAN OF CARE
Goal Outcome Evaluation:  Plan of Care Reviewed With: patient           Outcome Evaluation: VSS, no c/o pain. Pt appeared to sleep well between care. 1-2LNC placed for possible sleep apnea overnight. Will CTM, safety maintained.

## 2023-03-23 ENCOUNTER — APPOINTMENT (OUTPATIENT)
Dept: GENERAL RADIOLOGY | Facility: HOSPITAL | Age: 78
DRG: 227 | End: 2023-03-23
Payer: MEDICARE

## 2023-03-23 ENCOUNTER — ANESTHESIA (OUTPATIENT)
Dept: PERIOP | Facility: HOSPITAL | Age: 78
DRG: 227 | End: 2023-03-23
Payer: MEDICARE

## 2023-03-23 ENCOUNTER — ANCILLARY PROCEDURE (OUTPATIENT)
Dept: PERIOP | Facility: HOSPITAL | Age: 78
DRG: 227 | End: 2023-03-23
Payer: MEDICARE

## 2023-03-23 ENCOUNTER — ANESTHESIA EVENT (OUTPATIENT)
Dept: PERIOP | Facility: HOSPITAL | Age: 78
DRG: 227 | End: 2023-03-23
Payer: MEDICARE

## 2023-03-23 LAB
ANION GAP SERPL CALCULATED.3IONS-SCNC: 10 MMOL/L (ref 5–15)
BUN SERPL-MCNC: 23 MG/DL (ref 8–23)
BUN/CREAT SERPL: 19 (ref 7–25)
CALCIUM SPEC-SCNC: 9 MG/DL (ref 8.6–10.5)
CHLORIDE SERPL-SCNC: 105 MMOL/L (ref 98–107)
CO2 SERPL-SCNC: 25 MMOL/L (ref 22–29)
CREAT SERPL-MCNC: 1.21 MG/DL (ref 0.76–1.27)
DEPRECATED RDW RBC AUTO: 41.3 FL (ref 37–54)
EGFRCR SERPLBLD CKD-EPI 2021: 62.1 ML/MIN/1.73
ERYTHROCYTE [DISTWIDTH] IN BLOOD BY AUTOMATED COUNT: 12.1 % (ref 12.3–15.4)
GLUCOSE SERPL-MCNC: 96 MG/DL (ref 65–99)
HCT VFR BLD AUTO: 44.2 % (ref 37.5–51)
HGB BLD-MCNC: 15.1 G/DL (ref 13–17.7)
MCH RBC QN AUTO: 31.8 PG (ref 26.6–33)
MCHC RBC AUTO-ENTMCNC: 34.2 G/DL (ref 31.5–35.7)
MCV RBC AUTO: 93.1 FL (ref 79–97)
PLATELET # BLD AUTO: 278 10*3/MM3 (ref 140–450)
PMV BLD AUTO: 9.8 FL (ref 6–12)
POTASSIUM SERPL-SCNC: 4.3 MMOL/L (ref 3.5–5.2)
RBC # BLD AUTO: 4.75 10*6/MM3 (ref 4.14–5.8)
SODIUM SERPL-SCNC: 140 MMOL/L (ref 136–145)
WBC NRBC COR # BLD: 10.54 10*3/MM3 (ref 3.4–10.8)

## 2023-03-23 PROCEDURE — C1760 CLOSURE DEV, VASC: HCPCS | Performed by: INTERNAL MEDICINE

## 2023-03-23 PROCEDURE — C1769 GUIDE WIRE: HCPCS | Performed by: INTERNAL MEDICINE

## 2023-03-23 PROCEDURE — 80048 BASIC METABOLIC PNL TOTAL CA: CPT

## 2023-03-23 PROCEDURE — C1773 RET DEV, INSERTABLE: HCPCS | Performed by: INTERNAL MEDICINE

## 2023-03-23 PROCEDURE — 86920 COMPATIBILITY TEST SPIN: CPT

## 2023-03-23 PROCEDURE — 85027 COMPLETE CBC AUTOMATED: CPT

## 2023-03-23 PROCEDURE — 25010000002 PHENYLEPHRINE 10 MG/ML SOLUTION 5 ML VIAL: Performed by: ANESTHESIOLOGY

## 2023-03-23 PROCEDURE — 25010000002 NEOSTIGMINE 5 MG/10ML SOLUTION: Performed by: ANESTHESIOLOGY

## 2023-03-23 PROCEDURE — 0JH608Z INSERTION OF DEFIBRILLATOR GENERATOR INTO CHEST SUBCUTANEOUS TISSUE AND FASCIA, OPEN APPROACH: ICD-10-PCS | Performed by: INTERNAL MEDICINE

## 2023-03-23 PROCEDURE — C1894 INTRO/SHEATH, NON-LASER: HCPCS | Performed by: INTERNAL MEDICINE

## 2023-03-23 PROCEDURE — 25010000002 CEFAZOLIN IN DEXTROSE 2-4 GM/100ML-% SOLUTION: Performed by: INTERNAL MEDICINE

## 2023-03-23 PROCEDURE — 02PA3MZ REMOVAL OF CARDIAC LEAD FROM HEART, PERCUTANEOUS APPROACH: ICD-10-PCS | Performed by: INTERNAL MEDICINE

## 2023-03-23 PROCEDURE — 25510000001 IOPAMIDOL PER 1 ML: Performed by: INTERNAL MEDICINE

## 2023-03-23 PROCEDURE — C1777 LEAD, AICD, ENDO SINGLE COIL: HCPCS | Performed by: INTERNAL MEDICINE

## 2023-03-23 PROCEDURE — C2629 INTRO/SHEATH, LASER: HCPCS | Performed by: INTERNAL MEDICINE

## 2023-03-23 PROCEDURE — 93318 ECHO TRANSESOPHAGEAL INTRAOP: CPT | Performed by: ANESTHESIOLOGY

## 2023-03-23 PROCEDURE — C1882 AICD, OTHER THAN SING/DUAL: HCPCS | Performed by: INTERNAL MEDICINE

## 2023-03-23 PROCEDURE — C1893 INTRO/SHEATH, FIXED,NON-PEEL: HCPCS | Performed by: INTERNAL MEDICINE

## 2023-03-23 PROCEDURE — 25010000002 FENTANYL CITRATE (PF) 50 MCG/ML SOLUTION: Performed by: ANESTHESIOLOGY

## 2023-03-23 PROCEDURE — C1900 LEAD, CORONARY VENOUS: HCPCS | Performed by: INTERNAL MEDICINE

## 2023-03-23 PROCEDURE — C1898 LEAD, PMKR, OTHER THAN TRANS: HCPCS | Performed by: INTERNAL MEDICINE

## 2023-03-23 PROCEDURE — C1892 INTRO/SHEATH,FIXED,PEEL-AWAY: HCPCS | Performed by: INTERNAL MEDICINE

## 2023-03-23 PROCEDURE — 25010000002 PROPOFOL 10 MG/ML EMULSION: Performed by: ANESTHESIOLOGY

## 2023-03-23 PROCEDURE — 0JPT0PZ REMOVAL OF CARDIAC RHYTHM RELATED DEVICE FROM TRUNK SUBCUTANEOUS TISSUE AND FASCIA, OPEN APPROACH: ICD-10-PCS | Performed by: INTERNAL MEDICINE

## 2023-03-23 PROCEDURE — 02HK3KZ INSERTION OF DEFIBRILLATOR LEAD INTO RIGHT VENTRICLE, PERCUTANEOUS APPROACH: ICD-10-PCS | Performed by: INTERNAL MEDICINE

## 2023-03-23 PROCEDURE — 25010000002 EPINEPHRINE 1 MG/ML SOLUTION: Performed by: ANESTHESIOLOGY

## 2023-03-23 PROCEDURE — 25010000002 ONDANSETRON PER 1 MG: Performed by: ANESTHESIOLOGY

## 2023-03-23 PROCEDURE — C1889 IMPLANT/INSERT DEVICE, NOC: HCPCS | Performed by: INTERNAL MEDICINE

## 2023-03-23 PROCEDURE — 71045 X-RAY EXAM CHEST 1 VIEW: CPT

## 2023-03-23 PROCEDURE — 25010000002 DEXAMETHASONE SODIUM PHOSPHATE 20 MG/5ML SOLUTION: Performed by: ANESTHESIOLOGY

## 2023-03-23 DEVICE — ENV PM AIGISRX ANTIBAC RESORB 2.9X3.3IN LG: Type: IMPLANTABLE DEVICE | Site: HEART | Status: FUNCTIONAL

## 2023-03-23 DEVICE — PACE/SENSE LEAD
Type: IMPLANTABLE DEVICE | Site: HEART | Status: FUNCTIONAL
Brand: INGEVITY™+

## 2023-03-23 DEVICE — INTEGRATED BIPOLAR PACE/SENSE AND DEFIBRILLATION LEAD
Type: IMPLANTABLE DEVICE | Site: HEART | Status: FUNCTIONAL
Brand: RELIANCE 4-FRONT™

## 2023-03-23 DEVICE — CARDIAC RESYNCHRONIZATION THERAPY DEFIBRILLATOR
Type: IMPLANTABLE DEVICE | Site: HEART | Status: FUNCTIONAL
Brand: MOMENTUM™ CRT-D

## 2023-03-23 RX ORDER — ONDANSETRON 2 MG/ML
4 INJECTION INTRAMUSCULAR; INTRAVENOUS ONCE AS NEEDED
Status: DISCONTINUED | OUTPATIENT
Start: 2023-03-23 | End: 2023-03-23 | Stop reason: HOSPADM

## 2023-03-23 RX ORDER — LIDOCAINE HYDROCHLORIDE 20 MG/ML
INJECTION, SOLUTION EPIDURAL; INFILTRATION; INTRACAUDAL; PERINEURAL AS NEEDED
Status: DISCONTINUED | OUTPATIENT
Start: 2023-03-23 | End: 2023-03-23 | Stop reason: SURG

## 2023-03-23 RX ORDER — FAMOTIDINE 10 MG/ML
20 INJECTION, SOLUTION INTRAVENOUS ONCE
Status: COMPLETED | OUTPATIENT
Start: 2023-03-23 | End: 2023-03-23

## 2023-03-23 RX ORDER — LABETALOL HYDROCHLORIDE 5 MG/ML
5 INJECTION, SOLUTION INTRAVENOUS
Status: DISCONTINUED | OUTPATIENT
Start: 2023-03-23 | End: 2023-03-23 | Stop reason: HOSPADM

## 2023-03-23 RX ORDER — SODIUM CHLORIDE, SODIUM LACTATE, POTASSIUM CHLORIDE, CALCIUM CHLORIDE 600; 310; 30; 20 MG/100ML; MG/100ML; MG/100ML; MG/100ML
9 INJECTION, SOLUTION INTRAVENOUS CONTINUOUS
Status: DISCONTINUED | OUTPATIENT
Start: 2023-03-23 | End: 2023-03-24 | Stop reason: HOSPADM

## 2023-03-23 RX ORDER — HYDROMORPHONE HYDROCHLORIDE 1 MG/ML
0.25 INJECTION, SOLUTION INTRAMUSCULAR; INTRAVENOUS; SUBCUTANEOUS
Status: DISCONTINUED | OUTPATIENT
Start: 2023-03-23 | End: 2023-03-23 | Stop reason: HOSPADM

## 2023-03-23 RX ORDER — NEOSTIGMINE METHYLSULFATE 0.5 MG/ML
INJECTION, SOLUTION INTRAVENOUS AS NEEDED
Status: DISCONTINUED | OUTPATIENT
Start: 2023-03-23 | End: 2023-03-23 | Stop reason: SURG

## 2023-03-23 RX ORDER — ROCURONIUM BROMIDE 10 MG/ML
INJECTION, SOLUTION INTRAVENOUS AS NEEDED
Status: DISCONTINUED | OUTPATIENT
Start: 2023-03-23 | End: 2023-03-23 | Stop reason: SURG

## 2023-03-23 RX ORDER — GLYCOPYRROLATE 0.2 MG/ML
INJECTION INTRAMUSCULAR; INTRAVENOUS AS NEEDED
Status: DISCONTINUED | OUTPATIENT
Start: 2023-03-23 | End: 2023-03-23 | Stop reason: SURG

## 2023-03-23 RX ORDER — FENTANYL CITRATE 50 UG/ML
25 INJECTION, SOLUTION INTRAMUSCULAR; INTRAVENOUS
Status: DISCONTINUED | OUTPATIENT
Start: 2023-03-23 | End: 2023-03-23 | Stop reason: HOSPADM

## 2023-03-23 RX ORDER — HYDRALAZINE HYDROCHLORIDE 20 MG/ML
5 INJECTION INTRAMUSCULAR; INTRAVENOUS
Status: DISCONTINUED | OUTPATIENT
Start: 2023-03-23 | End: 2023-03-23 | Stop reason: HOSPADM

## 2023-03-23 RX ORDER — FLUMAZENIL 0.1 MG/ML
0.2 INJECTION INTRAVENOUS AS NEEDED
Status: DISCONTINUED | OUTPATIENT
Start: 2023-03-23 | End: 2023-03-23 | Stop reason: HOSPADM

## 2023-03-23 RX ORDER — FENTANYL CITRATE 50 UG/ML
INJECTION, SOLUTION INTRAMUSCULAR; INTRAVENOUS
Status: COMPLETED | OUTPATIENT
Start: 2023-03-23 | End: 2023-03-23

## 2023-03-23 RX ORDER — DROPERIDOL 2.5 MG/ML
0.62 INJECTION, SOLUTION INTRAMUSCULAR; INTRAVENOUS
Status: DISCONTINUED | OUTPATIENT
Start: 2023-03-23 | End: 2023-03-23 | Stop reason: HOSPADM

## 2023-03-23 RX ORDER — HYDROCODONE BITARTRATE AND ACETAMINOPHEN 5; 325 MG/1; MG/1
1 TABLET ORAL ONCE AS NEEDED
Status: DISCONTINUED | OUTPATIENT
Start: 2023-03-23 | End: 2023-03-23 | Stop reason: HOSPADM

## 2023-03-23 RX ORDER — SODIUM CHLORIDE 0.9 % (FLUSH) 0.9 %
3 SYRINGE (ML) INJECTION EVERY 12 HOURS SCHEDULED
Status: DISCONTINUED | OUTPATIENT
Start: 2023-03-23 | End: 2023-03-23 | Stop reason: HOSPADM

## 2023-03-23 RX ORDER — ONDANSETRON 2 MG/ML
INJECTION INTRAMUSCULAR; INTRAVENOUS AS NEEDED
Status: DISCONTINUED | OUTPATIENT
Start: 2023-03-23 | End: 2023-03-23 | Stop reason: SURG

## 2023-03-23 RX ORDER — SODIUM CHLORIDE 9 MG/ML
INJECTION, SOLUTION INTRAVENOUS CONTINUOUS PRN
Status: DISCONTINUED | OUTPATIENT
Start: 2023-03-23 | End: 2023-03-23 | Stop reason: SURG

## 2023-03-23 RX ORDER — FENTANYL CITRATE 50 UG/ML
50 INJECTION, SOLUTION INTRAMUSCULAR; INTRAVENOUS
Status: DISCONTINUED | OUTPATIENT
Start: 2023-03-23 | End: 2023-03-23 | Stop reason: HOSPADM

## 2023-03-23 RX ORDER — SODIUM CHLORIDE 0.9 % (FLUSH) 0.9 %
3-10 SYRINGE (ML) INJECTION AS NEEDED
Status: DISCONTINUED | OUTPATIENT
Start: 2023-03-23 | End: 2023-03-23 | Stop reason: HOSPADM

## 2023-03-23 RX ORDER — NALOXONE HCL 0.4 MG/ML
0.2 VIAL (ML) INJECTION AS NEEDED
Status: DISCONTINUED | OUTPATIENT
Start: 2023-03-23 | End: 2023-03-23 | Stop reason: HOSPADM

## 2023-03-23 RX ORDER — EPHEDRINE SULFATE 50 MG/ML
5 INJECTION, SOLUTION INTRAVENOUS ONCE AS NEEDED
Status: DISCONTINUED | OUTPATIENT
Start: 2023-03-23 | End: 2023-03-23 | Stop reason: HOSPADM

## 2023-03-23 RX ORDER — DEXAMETHASONE SODIUM PHOSPHATE 4 MG/ML
INJECTION, SOLUTION INTRA-ARTICULAR; INTRALESIONAL; INTRAMUSCULAR; INTRAVENOUS; SOFT TISSUE AS NEEDED
Status: DISCONTINUED | OUTPATIENT
Start: 2023-03-23 | End: 2023-03-23 | Stop reason: SURG

## 2023-03-23 RX ORDER — PHENYLEPHRINE HCL IN 0.9% NACL 1 MG/10 ML
SYRINGE (ML) INTRAVENOUS AS NEEDED
Status: DISCONTINUED | OUTPATIENT
Start: 2023-03-23 | End: 2023-03-23 | Stop reason: SURG

## 2023-03-23 RX ORDER — PROMETHAZINE HYDROCHLORIDE 25 MG/1
25 TABLET ORAL ONCE AS NEEDED
Status: DISCONTINUED | OUTPATIENT
Start: 2023-03-23 | End: 2023-03-23 | Stop reason: HOSPADM

## 2023-03-23 RX ORDER — HYDROCODONE BITARTRATE AND ACETAMINOPHEN 7.5; 325 MG/1; MG/1
1 TABLET ORAL EVERY 4 HOURS PRN
Status: DISCONTINUED | OUTPATIENT
Start: 2023-03-23 | End: 2023-03-23 | Stop reason: HOSPADM

## 2023-03-23 RX ORDER — LIDOCAINE HYDROCHLORIDE 10 MG/ML
0.5 INJECTION, SOLUTION EPIDURAL; INFILTRATION; INTRACAUDAL; PERINEURAL ONCE AS NEEDED
Status: DISCONTINUED | OUTPATIENT
Start: 2023-03-23 | End: 2023-03-23 | Stop reason: HOSPADM

## 2023-03-23 RX ORDER — IPRATROPIUM BROMIDE AND ALBUTEROL SULFATE 2.5; .5 MG/3ML; MG/3ML
3 SOLUTION RESPIRATORY (INHALATION) ONCE AS NEEDED
Status: DISCONTINUED | OUTPATIENT
Start: 2023-03-23 | End: 2023-03-23 | Stop reason: HOSPADM

## 2023-03-23 RX ORDER — DIPHENHYDRAMINE HYDROCHLORIDE 50 MG/ML
12.5 INJECTION INTRAMUSCULAR; INTRAVENOUS
Status: DISCONTINUED | OUTPATIENT
Start: 2023-03-23 | End: 2023-03-23 | Stop reason: HOSPADM

## 2023-03-23 RX ORDER — CEFAZOLIN SODIUM 2 G/100ML
2 INJECTION, SOLUTION INTRAVENOUS ONCE
Status: COMPLETED | OUTPATIENT
Start: 2023-03-23 | End: 2023-03-23

## 2023-03-23 RX ORDER — PROMETHAZINE HYDROCHLORIDE 25 MG/1
25 SUPPOSITORY RECTAL ONCE AS NEEDED
Status: DISCONTINUED | OUTPATIENT
Start: 2023-03-23 | End: 2023-03-23 | Stop reason: HOSPADM

## 2023-03-23 RX ORDER — PROPOFOL 10 MG/ML
VIAL (ML) INTRAVENOUS AS NEEDED
Status: DISCONTINUED | OUTPATIENT
Start: 2023-03-23 | End: 2023-03-23 | Stop reason: SURG

## 2023-03-23 RX ADMIN — Medication 0.02 MCG/KG/MIN: at 13:26

## 2023-03-23 RX ADMIN — ONDANSETRON 4 MG: 2 INJECTION INTRAMUSCULAR; INTRAVENOUS at 16:08

## 2023-03-23 RX ADMIN — SODIUM CHLORIDE, POTASSIUM CHLORIDE, SODIUM LACTATE AND CALCIUM CHLORIDE 9 ML/HR: 600; 310; 30; 20 INJECTION, SOLUTION INTRAVENOUS at 12:58

## 2023-03-23 RX ADMIN — GLYCOPYRROLATE 0.6 MCG: 1 INJECTION INTRAMUSCULAR; INTRAVENOUS at 16:08

## 2023-03-23 RX ADMIN — Medication 100 MCG: at 13:24

## 2023-03-23 RX ADMIN — AMIODARONE HYDROCHLORIDE 400 MG: 200 TABLET ORAL at 09:08

## 2023-03-23 RX ADMIN — PROPOFOL 25 MCG/KG/MIN: 10 INJECTION, EMULSION INTRAVENOUS at 13:27

## 2023-03-23 RX ADMIN — NEOSTIGMINE METHYLSULFATE 3 MG: 0.5 INJECTION INTRAVENOUS at 16:08

## 2023-03-23 RX ADMIN — CARVEDILOL 3.12 MG: 3.12 TABLET, FILM COATED ORAL at 09:08

## 2023-03-23 RX ADMIN — ROCURONIUM BROMIDE 50 MG: 10 INJECTION, SOLUTION INTRAVENOUS at 13:24

## 2023-03-23 RX ADMIN — DEXAMETHASONE SODIUM PHOSPHATE 8 MG: 4 INJECTION, SOLUTION INTRAMUSCULAR; INTRAVENOUS at 14:37

## 2023-03-23 RX ADMIN — PROPOFOL 140 MG: 10 INJECTION, EMULSION INTRAVENOUS at 13:24

## 2023-03-23 RX ADMIN — SODIUM CHLORIDE: 900 INJECTION, SOLUTION INTRAVENOUS at 13:19

## 2023-03-23 RX ADMIN — LIDOCAINE HYDROCHLORIDE 100 MG: 20 INJECTION, SOLUTION EPIDURAL; INFILTRATION; INTRACAUDAL; PERINEURAL at 13:23

## 2023-03-23 RX ADMIN — CARVEDILOL 3.12 MG: 3.12 TABLET, FILM COATED ORAL at 19:38

## 2023-03-23 RX ADMIN — CEFAZOLIN SODIUM 2 G: 2 INJECTION, SOLUTION INTRAVENOUS at 13:21

## 2023-03-23 RX ADMIN — ATORVASTATIN CALCIUM 40 MG: 20 TABLET, FILM COATED ORAL at 09:08

## 2023-03-23 RX ADMIN — LOSARTAN POTASSIUM 25 MG: 50 TABLET, FILM COATED ORAL at 10:08

## 2023-03-23 RX ADMIN — PHENYLEPHRINE HYDROCHLORIDE 0.5 MCG/KG/MIN: 10 INJECTION INTRAVENOUS at 13:24

## 2023-03-23 RX ADMIN — FENTANYL CITRATE 50 MCG: 50 INJECTION, SOLUTION INTRAMUSCULAR; INTRAVENOUS at 12:57

## 2023-03-23 RX ADMIN — FAMOTIDINE 20 MG: 10 INJECTION INTRAVENOUS at 12:58

## 2023-03-23 RX ADMIN — AMIODARONE HYDROCHLORIDE 400 MG: 200 TABLET ORAL at 19:38

## 2023-03-23 NOTE — ANESTHESIA POSTPROCEDURE EVALUATION
Patient: Orion Zepeda    Procedure Summary     Date: 03/23/23 Room / Location: [address] CARDIOVASCULAR OPERATING ROOM    Anesthesia Start: 1314 Anesthesia Stop: 1700    Procedure: LASER PPM lead extraction transvenous; AICD LEAD IMPLANT, CRT PLACEMENT, WITH GENERATOR IMPLANT (Chest) Diagnosis:       Ventricular tachyarrhythmia (HCC)      Cardiomyopathy, unspecified type (HCC)      Syncope, unspecified syncope type      Third degree heart block (HCC)      Ischemic cardiomyopathy      (pacemaker mediated cardiomyopathy, ventricular tachycardia)    Surgeons: Bryn Gordon MD Provider: Arturo Toro MD    Anesthesia Type: general, Eva ASA Status: 3          Anesthesia Type: general, Eva    Vitals  Vitals Value Taken Time   /74 03/23/23 1800   Temp 36.4 °C (97.5 °F) 03/23/23 1655   Pulse 70 03/23/23 1800   Resp 14 03/23/23 1800   SpO2 97 % 03/23/23 1800           Post Anesthesia Care and Evaluation    Patient location during evaluation: bedside  Pain management: adequate    Airway patency: patent  Anesthetic complications: No anesthetic complications    Cardiovascular status: acceptable  Respiratory status: acceptable  Hydration status: acceptable

## 2023-03-23 NOTE — ANESTHESIA PROCEDURE NOTES
Arterial Line      Patient reassessed immediately prior to procedure    Patient location during procedure: holding area  Start time: 3/23/2023 12:58 PM  Stop Time:3/23/2023 1:01 PM       Line placed for hemodynamic monitoring, ABGs/Labs/ISTAT and MD/Surgeon request.  Performed By   Anesthesiologist: Arturo Toro MD   Preanesthetic Checklist  Completed: patient identified, IV checked, site marked, risks and benefits discussed, surgical consent, monitors and equipment checked, pre-op evaluation and timeout performed  Arterial Line Prep    Sterile Tech: cap, gloves, mask and sterile barriers  Prep: ChloraPrep  Patient monitoring: blood pressure monitoring, continuous pulse oximetry and EKG  Arterial Line Procedure   Laterality:left  Location:  radial artery  Catheter size: 20 G   Guidance: palpation technique  Number of attempts: 1  Successful placement: yes Images: still images not obtained  Post Assessment   Dressing Type: wrist guard applied, occlusive dressing applied and secured with tape.   Complications no  Circ/Move/Sens Assessment: normal and unchanged.   Patient Tolerance: patient tolerated the procedure well with no apparent complications

## 2023-03-23 NOTE — ANESTHESIA PROCEDURE NOTES
Airway  Urgency: elective    Date/Time: 3/23/2023 1:30 PM  Airway not difficult    General Information and Staff    Patient location during procedure: OR  Anesthesiologist: Arturo Toro MD    Indications and Patient Condition  Indications for airway management: airway protection    Preoxygenated: yes  Mask difficulty assessment: 1 - vent by mask    Final Airway Details  Final airway type: endotracheal airway      Successful airway: ETT  Cuffed: yes   Successful intubation technique: direct laryngoscopy  Facilitating devices/methods: intubating stylet  Endotracheal tube insertion site: oral  Blade: Heidi  Blade size: 4  ETT size (mm): 7.5  Cormack-Lehane Classification: grade IIb - view of arytenoids or posterior of glottis only  Placement verified by: chest auscultation   Measured from: lips  ETT/EBT  to lips (cm): 21  Number of attempts at approach: 1  Assessment: lips, teeth, and gum same as pre-op and atraumatic intubation

## 2023-03-23 NOTE — ANESTHESIA PROCEDURE NOTES
Diagnostic IntraOp Anesthesia Braulio    Procedure Performed: Diagnostic IntraOp Anesthesia Braulio       Start Time:  3/23/2023 1:33 PM       End Time:   3/23/2023 1:48 PM    Preanesthesia Checklist:  Patient identified, IV assessed, risks and benefits discussed, monitors and equipment assessed, procedure being performed at surgeon's request and anesthesia consent obtained.    General Procedure Information  Diagnostic Indications for Echo:  assessment of ascending aorta, assessment of surgical repair and hemodynamic monitoring  Physician Requesting Echo: Bryn Gordon MD  CPT Code:  63399, 59575  ICD Code for Medical Necessity:  Mitral regurgitation non rheumatic I34.1, atherosclerosis of aorta !70.0  Location performed:  OR  Intubated  Bite block placed  Heart visualized  Probe Insertion:  Easy  Probe Type:  Multiplane  Modalities:  Color flow mapping, pulse wave Doppler and continuous wave Doppler    Echocardiographic and Doppler Measurements    Ventricles    Right Ventricle:  Hypertrophy not present.  Thrombus not present.  Global function moderately impaired.    Left Ventricle:  Cavity size dilated.  Thrombus not present.  Global Function severely impaired.  Ejection Fraction 26%.          Valves    Aortic Valve:  Annulus normal.  Stenosis not present.  Area: 2.77 cm².  Mean Gradient: 3 mmHg.  Regurgitation absent.  Leaflets calcified and thickened.  Leaflet motions normal.      Mitral Valve:  Annulus normal.  Stenosis not present.  Mean Gradient: 1 mmHg.  Regurgitation mild.  Leaflets normal.  Leaflet motions normal.      Tricuspid Valve:  Annulus normal.  Stenosis not present.  Regurgitation trace.  Leaflets normal.  Leaflet motions normal.    Pulmonic Valve:  Regurgitation mild.        Aorta    Ascending Aorta:  Size normal.  Diameter 3.37 cm.  Dissection not present.  Plaque thickness less than 3 mm.  Mobile plaque not present.    Aortic Arch:  Size normal.  Diameter 2.6 cm.  Dissection not present.  Plaque  thickness less than 3 mm.  Mobile plaque not present.    Descending Aorta:  Size normal.  Diameter 2.3 cm.  Dissection not present.  Plaque thickness greater than 3 mm.  Mobile plaque not present.        Atria    Right Atrium:  Size normal.  Spontaneous echo contrast present.  Thrombus not present.  Tumor not present.  Device present.    Left Atrium:  Size dilated.  Spontaneous echo contrast present.  Thrombus not present.  Tumor not present.  Device not present.    Left atrial appendage normal.  Other Atria Findings:       Pacer Leads are visible in the right atrium.    Septa    Atrial Septum:  Intra-atrial septal morphology contains patent foramen ovale.          Diastolic Function Measurements:  Diastolic Dysfunction Grade=  E=  34.7 ms  A=  30.7 ms  E/A Ratio=  1.1  DT=  ms  S/D=  1.1  IVRT=    Other Findings  Pericardium:  pericardial effusion  Pulmonary Arteries:  normal  Pulmonary Venous Flow:  normal    Anesthesia Information  Performed Personally  Anesthesiologist:  Arturo Toro MD      Echocardiogram Comments:       After lead removal no change in effusion size.

## 2023-03-23 NOTE — PLAN OF CARE
Goal Outcome Evaluation:  Plan of Care Reviewed With: patient           Outcome Evaluation: VSS, no c/o pain. Pt appeared to sleep well between care. Placed 2-3LNC on pt for possible GREGORIO. Pt NPO at MN for procedure today. Will CTM, safety maintained.

## 2023-03-23 NOTE — PROGRESS NOTES
Patient NPO for extraction this afternoon. VSS, labs reviewed WNL. Proceed with PPM extraction and upgrade to CRT-D.

## 2023-03-24 ENCOUNTER — READMISSION MANAGEMENT (OUTPATIENT)
Dept: CALL CENTER | Facility: HOSPITAL | Age: 78
End: 2023-03-24
Payer: MEDICARE

## 2023-03-24 VITALS
TEMPERATURE: 98.9 F | HEART RATE: 128 BPM | DIASTOLIC BLOOD PRESSURE: 84 MMHG | WEIGHT: 234.4 LBS | HEIGHT: 71 IN | RESPIRATION RATE: 16 BRPM | OXYGEN SATURATION: 91 % | SYSTOLIC BLOOD PRESSURE: 117 MMHG | BODY MASS INDEX: 32.81 KG/M2

## 2023-03-24 LAB
ANION GAP SERPL CALCULATED.3IONS-SCNC: 11.5 MMOL/L (ref 5–15)
BUN SERPL-MCNC: 23 MG/DL (ref 8–23)
BUN/CREAT SERPL: 19.3 (ref 7–25)
CALCIUM SPEC-SCNC: 9.4 MG/DL (ref 8.6–10.5)
CHLORIDE SERPL-SCNC: 107 MMOL/L (ref 98–107)
CO2 SERPL-SCNC: 21.5 MMOL/L (ref 22–29)
CREAT SERPL-MCNC: 1.19 MG/DL (ref 0.76–1.27)
DEPRECATED RDW RBC AUTO: 41.2 FL (ref 37–54)
EGFRCR SERPLBLD CKD-EPI 2021: 63.3 ML/MIN/1.73
ERYTHROCYTE [DISTWIDTH] IN BLOOD BY AUTOMATED COUNT: 12.2 % (ref 12.3–15.4)
GLUCOSE SERPL-MCNC: 134 MG/DL (ref 65–99)
HCT VFR BLD AUTO: 38.9 % (ref 37.5–51)
HGB BLD-MCNC: 13.8 G/DL (ref 13–17.7)
MCH RBC QN AUTO: 33 PG (ref 26.6–33)
MCHC RBC AUTO-ENTMCNC: 35.5 G/DL (ref 31.5–35.7)
MCV RBC AUTO: 93.1 FL (ref 79–97)
PLATELET # BLD AUTO: 238 10*3/MM3 (ref 140–450)
PMV BLD AUTO: 9.6 FL (ref 6–12)
POTASSIUM SERPL-SCNC: 4.4 MMOL/L (ref 3.5–5.2)
QT INTERVAL: 484 MS
RBC # BLD AUTO: 4.18 10*6/MM3 (ref 4.14–5.8)
SODIUM SERPL-SCNC: 140 MMOL/L (ref 136–145)
WBC NRBC COR # BLD: 11.5 10*3/MM3 (ref 3.4–10.8)

## 2023-03-24 PROCEDURE — 93010 ELECTROCARDIOGRAM REPORT: CPT | Performed by: INTERNAL MEDICINE

## 2023-03-24 PROCEDURE — 85027 COMPLETE CBC AUTOMATED: CPT

## 2023-03-24 PROCEDURE — 93005 ELECTROCARDIOGRAM TRACING: CPT

## 2023-03-24 PROCEDURE — 99238 HOSP IP/OBS DSCHRG MGMT 30/<: CPT

## 2023-03-24 PROCEDURE — 80048 BASIC METABOLIC PNL TOTAL CA: CPT

## 2023-03-24 RX ORDER — LOSARTAN POTASSIUM 25 MG/1
25 TABLET ORAL
Qty: 30 TABLET | Refills: 2 | Status: SHIPPED | OUTPATIENT
Start: 2023-03-25

## 2023-03-24 RX ORDER — AMIODARONE HYDROCHLORIDE 200 MG/1
200 TABLET ORAL
Status: DISCONTINUED | OUTPATIENT
Start: 2023-03-25 | End: 2023-03-24

## 2023-03-24 RX ORDER — CARVEDILOL 6.25 MG/1
6.25 TABLET ORAL 2 TIMES DAILY WITH MEALS
Qty: 60 TABLET | Refills: 2 | Status: SHIPPED | OUTPATIENT
Start: 2023-03-24

## 2023-03-24 RX ORDER — FUROSEMIDE 40 MG/1
40 TABLET ORAL DAILY
Qty: 30 TABLET | Refills: 2 | Status: SHIPPED | OUTPATIENT
Start: 2023-03-24

## 2023-03-24 RX ORDER — CARVEDILOL 6.25 MG/1
6.25 TABLET ORAL 2 TIMES DAILY WITH MEALS
Status: DISCONTINUED | OUTPATIENT
Start: 2023-03-24 | End: 2023-03-24 | Stop reason: HOSPADM

## 2023-03-24 RX ADMIN — AMIODARONE HYDROCHLORIDE 400 MG: 200 TABLET ORAL at 07:38

## 2023-03-24 RX ADMIN — LOSARTAN POTASSIUM 25 MG: 50 TABLET, FILM COATED ORAL at 08:52

## 2023-03-24 RX ADMIN — ATORVASTATIN CALCIUM 40 MG: 20 TABLET, FILM COATED ORAL at 08:52

## 2023-03-24 RX ADMIN — CARVEDILOL 3.12 MG: 3.12 TABLET, FILM COATED ORAL at 07:38

## 2023-03-24 RX ADMIN — FUROSEMIDE 40 MG: 40 TABLET ORAL at 08:52

## 2023-03-24 NOTE — DISCHARGE SUMMARY
DISCHARGE NOTE    Patient Name: Orion Zepeda  Age/Sex: 77 y.o. male  : 1945  MRN: [id number]    Date of Discharge:  3/24/2023   Date of Admit: 3/17/2023  Encounter Provider: STEVE Frankel  Place of Service: [facility] CARDIOLOGY  Patient Care Team:  Bradley Retana DO as PCP - General (Internal Medicine)  Evan, Bryn Steven MD as Consulting Physician (Cardiology)    Subjective:     Discharge Diagnosis:    Ventricular tachyarrhythmia    Third degree heart block (HCC)    Syncope    Cardiomyopathy (HCC)    Ischemic cardiomyopathy      Hospital Course:   Orion Zepeda is a 77 y.o. male who is followed by Dr. Gordon.  He has a history of dyspnea and complete heart block---s/p DC PPM (2019).      He saw Dr. Gordon in March. He was doing okay at that time but still complaining of shortness of breath that worsened with activity.  He was pacing 99%. Echo was ordered at that time which showed LVEF 45-49%.  He was advised to follow up in 6 months.      He continued to complain of worsening dyspnea and dizziness. Another echo was repeated on 3/07/2023 showing severe depression of LVEF to 20% with dyssynchrony. 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 He underwent heart cath showing no obstructive disease. We recommended upgrade to CRT-D.         On 3/17/2023 we received alert from his device for possible VT. We reviewed the EGMs and could not rule out VT and given his severely depressed EF we admitted him on 3/17 and loaded with amiodarone. On 3/23 he underwent RV lead and generator extraction and upgrade to CRT-D. Difficult anatomy made LV lead placement difficult but ended up placing left bundle lead. This morning he has scant/dried drainage from incision site but no evidence of infection or hematoma. He will be discharged today with  incision check in one week and repeat BMP. GDMT with carvedilol and losartan.        Vital Signs  Temp:  [97.5 °F (36.4 °C)-98.9 °F (37.2 °C)] 98.9 °F (37.2 °C)  Heart Rate:  [] 128  Resp:  [14-18] 16  BP: ()/(57-90) 117/84    Intake/Output Summary (Last 24 hours) at 3/24/2023 0909  Last data filed at 3/24/2023 0800  Gross per 24 hour   Intake 1060 ml   Output --   Net 1060 ml       Physical Exam:    General Appearance: No acute distress, well developed and well nourished.   Eyes: Conjunctiva and lids: No erythema, swelling, or discharge. Sclera non-icteric.   HENT: Atraumatic, normocephalic. External eyes, ears, and nose normal.   Respiratory: No signs of respiratory distress. Respiration rhythm and depth normal.   Clear to auscultation. No rales, crackles, rhonchi, or wheezing auscultated.   Cardiovascular  Heart Rate and Rhythm: Paced, Heart Sounds: Normal S1 and S2. No S3 or S4 noted.  Gastrointestinal:  Abdomen soft, non-distended, non-tender.  Musculoskeletal: Normal movement of extremities  Skin: Warm and dry.   Psychiatric: Patient alert and oriented to person, place, and time. Speech and behavior appropriate. Normal mood and affect.    Labs:   Results from last 7 days   Lab Units 03/24/23  0329 03/23/23  0436 03/22/23  0438 03/21/23  0301 03/20/23  1157 03/18/23  0420 03/17/23  1434   SODIUM mmol/L 140 140 139 139 140 141 140   POTASSIUM mmol/L 4.4 4.3 4.2 4.0 4.0 4.3 3.6   CHLORIDE mmol/L 107 105 104 106 105 108* 106   CO2 mmol/L 21.5* 25.0 26.0 25.0 23.0 20.4* 23.0   BUN mg/dL 23 23 23 23 22 16 19   CREATININE mg/dL 1.19 1.21 1.20 1.23 1.17 0.97 1.00   GLUCOSE mg/dL 134* 96 104* 104* 95 102* 97   CALCIUM mg/dL 9.4 9.0 9.0 9.2 9.1 9.4 9.6   AST (SGOT) U/L  --   --   --   --   --   --  17   ALT (SGPT) U/L  --   --   --   --   --   --  25     Results from last 7 days   Lab Units 03/17/23  2338 03/17/23  1434   HSTROP T ng/L 26* 26*     Results from last 7 days   Lab Units 03/24/23  0329  03/23/23  0436 03/22/23  0438 03/21/23  0301 03/20/23  1157 03/18/23  0420 03/17/23  1434   WBC 10*3/mm3 11.50* 10.54 9.32 10.32 10.70 10.19 9.02   HEMOGLOBIN g/dL 13.8 15.1 14.6 15.1 15.0 15.3 15.1   HEMATOCRIT % 38.9 44.2 42.4 43.7 44.0 43.8 42.7   PLATELETS 10*3/mm3 238 278 249 267 256 273 247         Results from last 7 days   Lab Units 03/17/23  1434   MAGNESIUM mg/dL 2.2         Results from last 7 days   Lab Units 03/18/23  0420   PROBNP pg/mL 1,060.0           Discharge Diet:    Dietary Orders (From admission, onward)     Start     Ordered    03/23/23 1701  Diet: Regular/House Diet; Texture: Regular Texture (IDDSI 7); Fluid Consistency: Thin (IDDSI 0)  Diet Effective Now        References:    Diet Order [place]   Question Answer Comment   Diets: Regular/House Diet    Texture: Regular Texture (IDDSI 7)    Fluid Consistency: Thin (IDDSI 0)        03/23/23 1701                  Activity at Discharge:  no heavy lifting with left arm (less than 20lbs). No repetitive over head movement.     Discharge Medications     Discharge Medications      New Medications      Instructions Start Date   carvedilol 6.25 MG tablet  Commonly known as: COREG   6.25 mg, Oral, 2 Times Daily With Meals      losartan 25 MG tablet  Commonly known as: COZAAR   25 mg, Oral, Every 24 Hours Scheduled   Start Date: March 25, 2023        Continue These Medications      Instructions Start Date   albuterol sulfate  (90 Base) MCG/ACT inhaler  Commonly known as: PROVENTIL HFA;VENTOLIN HFA;PROAIR HFA   2 puffs, Inhalation, Every 4 Hours PRN      atorvastatin 40 MG tablet  Commonly known as: Lipitor   40 mg, Oral, Daily      furosemide 40 MG tablet  Commonly known as: LASIX   40 mg, Oral, Daily      multivitamin with minerals tablet tablet   1 tablet, Oral, Daily      sodium chloride 0.65 % nasal spray   1 spray, Nasal, As Needed         Stop These Medications    hydrALAZINE 25 MG tablet  Commonly known as: APRESOLINE     potassium chloride  ER 20 MEQ tablet controlled-release ER tablet  Commonly known as: K-TAB            Discharge disposition: home     Follow-up Appointments  Additional Instructions for the Follow-ups that You Need to Schedule     Basic Metabolic Panel    Mar 31, 2023 (Approximate)      Release to patient: Routine Release            Follow-up Information     Bradley Retana DO .    Specialty: Internal Medicine  Contact information:  [address]  [facility] [address]  [facility] [id number] [phone]             Bryn Gordon MD Follow up.    Specialties: Cardiology, Cardiac Electrophysiology  Why: the office will call you for incision check and labs in one week. Follow up appt with Dr. Gordon or Adonis Rojo in one month.  Contact information:  [address]  [facility] [address]  [facility] [address] [phone]                       Future Appointments   Date Time Provider Department Center   4/4/2023  9:00 AM MORGAN GILLESPIE DEVICE CHECK K [facility]   4/4/2023  9:45 AM Bryn Gordon MD [facility]   5/10/2023  8:00 AM Bradley Retana DO [facility] STEVE Rouse  03/24/23  09:09 EDT

## 2023-03-24 NOTE — PLAN OF CARE
Goal Outcome Evaluation:  Plan of Care Reviewed With: patient         S/P AICD; scant blood from open area above PPM site; Gauze placed; no other bleeding rest of night. R groin with gauze and tegaderm SND.

## 2023-03-24 NOTE — PROGRESS NOTES
[facility] Clinical Pharmacy Services: Patient Counseling Consult    Orion Zepeda has been counseled on the following medications: carvedilol and losartan. Counseling points included the following:    Explained indication of each medication, and patient's need for these medications.  Went over dosing and frequency of each medication.  Discussed any special administration, storage, or monitoring instructions with medications.  Discussed all important drug interactions, including over-the-counter medications and supplements.  Explained possible side effects for each medication.  Instructed the patient not to begin or discontinue any medications without informing his/her physician/pharmacist.    Patient expressed understanding and had no further questions.      Jacqueline Bird, Pharm.D., [facility]   Clinical Pharmacist  Phone Extension #[phone]

## 2023-03-24 NOTE — PROGRESS NOTES
"Enter Query Response Below      Query Response: no             If applicable, please update the problem list.     Patient: Orion Zepeda        : 1945  Account: [id number]           Admit Date:         How to Respond to this query:       a. Click New Note     b. Answer query within the yellow box.                c. Update the Problem List, if applicable.      If you have any questions about this query contact me at: [email or web address]    STEVE Frankel    Thank you for your response to the CDI query.   When responding to queries sent by CDI, the preferred method is to respond with a New Note. By responding with a new note, the note will include your query response and signature.  How to Respond to this query:  a. Click New Note  b. Answer query within the yellow box.  c. Update the Problem List, if applicable.    ----- Message -----  From: Adonis Rojo APRN  Sent: 3/24/2023  10:26 AM EDT  To: Kathleen Laguna  Subject: RE: CDI Query                                         No      ----- Message -----  From: Kathleen Laguna  Sent: 3/24/2023  10:21 AM EDT  To: STEVE Carlin  Subject: CDI Query                                             Patient: Orion Zepeda        : 1945  Account: [id number]           Admit Date:                                                     How to Respond to this query:                   a. Click New Note                b. Answer query within the yellow box.                c. Update the Problem List, if applicable.        If you have any questions about this query contact me at: [email or web address]     Adonis WILSON     77 year old male admitted for ventricular tachycardia was diagnosed with RENARD per progress notes 3/21 and 3/22. Creatinine 0.97 on 3/18 - 1.23 on 3/21. Progress notes state \"RENARD--kidney function stable.\"     Treatment: daily lab monitoring     After study, was acute kidney injury clinically supported during this admission?   "   ? Yes, please include additional clinical indicators:____________  ? No   ? Other- specify________  ? Unable to determine       By submitting this query, we are merely seeking further clarification of documentation to accurately reflect all conditions that you are monitoring, evaluating, treating or that extend the hospitalization or utilize additional resources of care. Please utilize your independent clinical judgment when addressing the question(s) above.      This query and your response, once completed, will be entered into the legal medical record.     Sincerely,  Kathleen Laguna RN CCDS  Clinical Documentation Integrity Program     Esig query sent 3/24 by:  Becky ALLEN RN, CCDS  Clinical Documentation Integrity Program   [email or web address]

## 2023-03-24 NOTE — OUTREACH NOTE
Prep Survey    Flowsheet Row Responses   [facility] facility patient discharged from? [facility]   Is LACE score < 7 ? No   Eligibility [facility]   Date of Admission 03/17/23   Date of Discharge 03/24/23   Discharge Disposition Home or Self Care   Discharge diagnosis Ventricular tachyarrhythmia Third degree heart block  generator extraction and upgrade to CRT-D   Does the patient have one of the following disease processes/diagnoses(primary or secondary)? General Surgery   Does the patient have Home health ordered? No   Is there a DME ordered? No   Prep survey completed? Yes          VASU CROUCH - Registered Nurse

## 2023-03-25 LAB
BH BB BLOOD EXPIRATION DATE: NORMAL
BH BB BLOOD EXPIRATION DATE: NORMAL
BH BB BLOOD TYPE BARCODE: 5100
BH BB BLOOD TYPE BARCODE: 5100
BH BB DISPENSE STATUS: NORMAL
BH BB DISPENSE STATUS: NORMAL
BH BB PRODUCT CODE: NORMAL
BH BB PRODUCT CODE: NORMAL
BH BB UNIT NUMBER: NORMAL
BH BB UNIT NUMBER: NORMAL
CROSSMATCH INTERPRETATION: NORMAL
CROSSMATCH INTERPRETATION: NORMAL
UNIT  ABO: NORMAL
UNIT  ABO: NORMAL
UNIT  RH: NORMAL
UNIT  RH: NORMAL

## 2023-03-27 ENCOUNTER — TRANSITIONAL CARE MANAGEMENT TELEPHONE ENCOUNTER (OUTPATIENT)
Dept: CALL CENTER | Facility: HOSPITAL | Age: 78
End: 2023-03-27
Payer: MEDICARE

## 2023-03-27 NOTE — OUTREACH NOTE
Call Center TCM Note    Flowsheet Row Responses   [facility] patient discharged from? [place]   Does the patient have one of the following disease processes/diagnoses(primary or secondary)? General Surgery   TCM attempt successful? Yes   Call start time 1009   Call end time 1016   Discharge diagnosis Ventricular tachyarrhythmia Third degree heart block  generator extraction and upgrade to CRT-D   Person spoke with today (if not patient) and relationship pt   Meds reviewed with patient/caregiver? Yes   Is the patient having any side effects they believe may be caused by any medication additions or changes? No   Does the patient have all medications related to this admission filled (includes all antibiotics, pain medications, etc.) Yes   Is the patient taking all medications as directed (includes completed medication regime)? Yes   Comments Cardiology 4/4/23, and advised to make a post-op apt   Does the patient have an appointment with their PCP within 7 days of discharge? Yes  [ 3/31/2023 at 11:15 AM ]   Psychosocial issues? No   Did the patient receive a copy of their discharge instructions? Yes   What is the patient's perception of their health status since discharge? Improving   Nursing interventions Nurse provided patient education   Is the patient /caregiver able to teach back basic post-op care? No tub bath, swimming, or hot tub until instructed by MD, Take showers only when approved by MD-sponge bathe until then, Lifting as instructed by MD in discharge instructions, Keep incision areas clean,dry and protected   Is the patient/caregiver able to teach back signs and symptoms of incisional infection? Fever, Pus or odor from incision, Incisional warmth, Increased drainage or bleeding, Increased redness, swelling or pain at the incisonal site   Is the patient/caregiver able to teach back steps to recovery at home? Rest and rebuild strength, gradually increase activity, Eat a well-balance diet   If the  patient is a current smoker, are they able to teach back resources for cessation? Not a smoker   Is the patient/caregiver able to teach back the hierarchy of who to call/visit for symptoms/problems? PCP, Specialist, Home health nurse, Urgent Care, ED, 911 Yes   TCM call completed? Yes   Wrap up additional comments Pt states he is doing ok, but is feeling wobbly when walking. Pt denies fever, or increased redness, swelling, draiange at incisional site. Reviewed AVS/medications with pt. Pt veriifed [facility] fu appt on 3/31/23, Cardiology 4/4/23, and advised to make a post-op appt for 1 month.   Call end time 1016   Would this patient benefit from a Referral to Amb Social Work? No   Is the patient interested in additional calls from an ambulatory ?  NOTE:  applies to high risk patients requiring additional follow-up. No          Brianda Long RN    3/27/2023, 10:20 EDT

## 2023-03-30 ENCOUNTER — CLINICAL SUPPORT NO REQUIREMENTS (OUTPATIENT)
Dept: CARDIOLOGY | Facility: CLINIC | Age: 78
End: 2023-03-30
Payer: MEDICARE

## 2023-03-30 DIAGNOSIS — I42.8 NICM (NONISCHEMIC CARDIOMYOPATHY): Primary | ICD-10-CM

## 2023-03-31 ENCOUNTER — OFFICE VISIT (OUTPATIENT)
Dept: INTERNAL MEDICINE | Facility: CLINIC | Age: 78
End: 2023-03-31
Payer: MEDICARE

## 2023-03-31 VITALS
BODY MASS INDEX: 32.9 KG/M2 | HEIGHT: 71 IN | WEIGHT: 235 LBS | DIASTOLIC BLOOD PRESSURE: 68 MMHG | SYSTOLIC BLOOD PRESSURE: 106 MMHG | OXYGEN SATURATION: 98 % | HEART RATE: 78 BPM

## 2023-03-31 DIAGNOSIS — Z09 HOSPITAL DISCHARGE FOLLOW-UP: Primary | ICD-10-CM

## 2023-03-31 DIAGNOSIS — I44.2 THIRD DEGREE HEART BLOCK: ICD-10-CM

## 2023-03-31 DIAGNOSIS — I50.20 HFREF (HEART FAILURE WITH REDUCED EJECTION FRACTION): ICD-10-CM

## 2023-03-31 DIAGNOSIS — I25.10 CORONARY ARTERY DISEASE INVOLVING NATIVE CORONARY ARTERY OF NATIVE HEART WITHOUT ANGINA PECTORIS: ICD-10-CM

## 2023-03-31 DIAGNOSIS — I47.20 VENTRICULAR TACHYARRHYTHMIA: ICD-10-CM

## 2023-03-31 DIAGNOSIS — I21.4 NSTEMI (NON-ST ELEVATED MYOCARDIAL INFARCTION): ICD-10-CM

## 2023-03-31 NOTE — PROGRESS NOTES
"Transitional Care Follow Up Visit  Subjective     Orion Zepeda is a 77 y.o. male who presents for a transitional care management visit.    Within 48 business hours after discharge our office contacted him via telephone to coordinate his care and needs.      I reviewed and discussed the details of that call along with the discharge summary, hospital problems, inpatient lab results, inpatient diagnostic studies, and consultation reports with Orion.     Current outpatient and discharge medications have been reconciled for the patient.  Reviewed by: Bradley Retana DO      Date of TCM Phone Call 3/24/2023   [facility]   Date of Admission 3/17/2023   Date of Discharge 3/24/2023   Discharge Disposition Home or Self Care     Risk for Readmission (LACE) Score: 10 (3/24/2023  6:00 AM)      History of Present Illness   Course During Hospital Stay:    \"Hospital Course:   Orion Zepeda is a 77 y.o. male who is followed by Dr. Gordon.  He has a history of dyspnea and complete heart block---s/p DC PPM (5/06/2019).      He saw Dr. Gordon in March. He was doing okay at that time but still complaining of shortness of breath that worsened with activity.  He was pacing 99%. Echo was ordered at that time which showed LVEF 45-49%.  He was advised to follow up in 6 months.       He continued to complain of worsening dyspnea and dizziness. Another echo was repeated on 3/07/2023 showing severe depression of LVEF to 20% with dyssynchrony. 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 He underwent heart cath showing no obstructive disease. We recommended upgrade to CRT-D.       On 3/17/2023 we received alert from his device for possible VT. We reviewed the EGMs and could not rule out VT and given his severely depressed EF we admitted him on 3/17 and loaded with amiodarone. On 3/23 he underwent RV lead and generator extraction and upgrade to CRT-D. Difficult anatomy made LV lead placement " "difficult but ended up placing left bundle lead. This morning he has scant/dried drainage from incision site but no evidence of infection or hematoma. He will be discharged today with incision check in one week and repeat BMP. GDMT with carvedilol and losartan. \"    States overall \"breathing is fine\" at rest and when he walks a few minutes he begins to lose his balance. Breathing with activity has gotten a little better since being out of the hospital.   Heart failure cardiology appointment scheduled for April 17th at [place].   Family states his abdominal bloating has improved.     The following portions of the patient's history were reviewed and updated as appropriate: allergies, current medications, past family history, past medical history, past social history, past surgical history and problem list.        Objective   Physical Exam  Vitals reviewed.   Constitutional:       General: He is not in acute distress.     Appearance: He is obese. He is not ill-appearing.   HENT:      Head: Atraumatic.   Eyes:      General: No scleral icterus.  Cardiovascular:      Rate and Rhythm: Normal rate and regular rhythm.      Heart sounds: Normal heart sounds. No murmur heard.  Pulmonary:      Effort: Pulmonary effort is normal. No respiratory distress.      Breath sounds: Normal breath sounds. No stridor. No wheezing or rhonchi.   Musculoskeletal:      Right lower leg: No edema.      Left lower leg: No edema.   Skin:     Coloration: Skin is not jaundiced.   Neurological:      Mental Status: He is alert.   Psychiatric:         Mood and Affect: Mood normal.         Behavior: Behavior normal.         Thought Content: Thought content normal.         Assessment & Plan   Diagnoses and all orders for this visit:    1. Hospital discharge follow-up (Primary)  2. HFrEF (heart failure with reduced ejection fraction) (HCC)  3. Third degree heart block (HCC)  4. Coronary artery disease involving native coronary artery of native heart " without angina pectoris  5. Ventricular tachyarrhythmia  6.NSTEMI  -pt seen today for hospital discharge follow up  -admitted 3/17/23, discharged 3/24/23 from [facility]  -I have reviewed the hospital admission H&P as well as the discharge summary as above  -overall patient has improved breathing and exercise tolerance since receiving CRT-D though he does still report some balance issues when walking for several minutes  -in regards to his HFrEF, he appears euvolemic on exam today and is currently on beta blocker, low dose ARB, furosemide 40 mg daily; he plans to establish with [facility] next month and I will defer further medical management to them. Certainly he is a candidate for SGLT2 therapy as well as a potential switch of his ARB to a ARNI. I do not believe his BP will tolerate any higher doses of beta blocker today as he is borderline low at 106/68 pulse 78.  -in regards to his coronary artery disease, this is being treated medically. He is taking atorvastatin 40 mg daily but his LDL in August was not at goal less than 70. He is also not on aspirin 81 mg daily. I will follow up with him on these topics in May after he has seen cardiology  Lab Results   Component Value Date    CHLPL 163 08/08/2022    TRIG 142 08/08/2022    HDL 42 08/08/2022    LDL 96 08/08/2022   -will refer to cardiac rehab       TTE Interpretation Summary       •  Left ventricular systolic function is severely decreased. Left ventricular ejection fraction appears to be 21 - 25%.  •  The left ventricular cavity is moderate to severely dilated.  •  Left ventricular wall thickness is consistent with mild to moderate concentric hypertrophy.  •  The following left ventricular wall segments are hypokinetic: apical lateral, basal inferolateral and mid inferolateral. The following left ventricular wall segments are akinetic: apical inferior, basal inferior, mid inferior, apical septal, basal inferoseptal and mid  inferoseptal.  •  Left ventricular diastolic function was normal.  •  There is calcification of the aortic valve.  •  Mild dilation of the proximal aorta is present.  Lab Results   Component Value Date    GLUCOSE 134 (H) 03/24/2023    BUN 23 03/24/2023    CREATININE 1.19 03/24/2023    EGFRRESULT 60.8 02/08/2023    EGFR 63.3 03/24/2023    BCR 19.3 03/24/2023    K 4.4 03/24/2023    CO2 21.5 (L) 03/24/2023    CALCIUM 9.4 03/24/2023    PROTENTOTREF 7.1 02/08/2023    ALBUMIN 4.0 03/17/2023    BILITOT 1.1 03/17/2023    AST 17 03/17/2023    ALT 25 03/17/2023     Lab Results   Component Value Date    WBC 11.50 (H) 03/24/2023    HGB 13.8 03/24/2023    HCT 38.9 03/24/2023    MCV 93.1 03/24/2023     03/24/2023

## 2023-04-04 ENCOUNTER — READMISSION MANAGEMENT (OUTPATIENT)
Dept: CALL CENTER | Facility: HOSPITAL | Age: 78
End: 2023-04-04
Payer: MEDICARE

## 2023-04-04 NOTE — OUTREACH NOTE
General Surgery Week 2 Survey    Flowsheet Row Responses   [facility] patient discharged from? [place]   Does the patient have one of the following disease processes/diagnoses(primary or secondary)? General Surgery   Week 2 attempt successful? Yes   Call start time 0859   Call end time 0906   Discharge diagnosis Ventricular tachyarrhythmia Third degree heart block  generator extraction and upgrade to CRT-D   Meds reviewed with patient/caregiver? Yes   Is the patient having any side effects they believe may be caused by any medication additions or changes? No   Does the patient have all medications related to this admission filled (includes all antibiotics, pain medications, etc.) Yes   Is the patient taking all medications as directed (includes completed medication regime)? Yes   Does the patient have a follow up appointment scheduled with their surgeon? Yes   Has the patient kept scheduled appointments due by today? Yes   Comments PCP appt on 3/31/23--Pt with wound check completed since on 3/31/23   Has home health visited the patient within 72 hours of discharge? N/A   Psychosocial issues? No   Did the patient receive a copy of their discharge instructions? Yes   Nursing interventions Reviewed instructions with patient   What is the patient's perception of their health status since discharge? Improving  [Pt reports he is still having some balance issues--encouraged to use walker/cane to prevent falls.  Reports BPs 120s/130s-70s/80s, endorses some dizziness if he gets up quickly.]   Nursing interventions Nurse provided patient education, Advised patient to call provider  [Noted on AVS that pt was supposed to have BMP completed approx. on March 31 but pt was unaware, PCP visit was on 3/31/23--will route a note to PCP office to assist with obtaining labs (pt is aware of need after discussion) ]   Is the patient /caregiver able to teach back basic post-op care? Lifting as instructed by MD in discharge  instructions, Keep incision areas clean,dry and protected   Is the patient/caregiver able to teach back signs and symptoms of incisional infection? Increased redness, swelling or pain at the incisonal site, Increased drainage or bleeding, Incisional warmth, Pus or odor from incision, Fever  [No issues with incisions--understands activity restrictions]   Is the patient/caregiver able to teach back steps to recovery at home? Rest and rebuild strength, gradually increase activity, Set small, achievable goals for return to baseline health   If the patient is a current smoker, are they able to teach back resources for cessation? Not a smoker   Is the patient/caregiver able to teach back the hierarchy of who to call/visit for symptoms/problems? PCP, Specialist, Home health nurse, Urgent Care, ED, 911 Yes   Week 2 call completed? Yes          HARRISON Upton Registered Nurse

## 2023-04-07 ENCOUNTER — TELEPHONE (OUTPATIENT)
Dept: INTERNAL MEDICINE | Facility: CLINIC | Age: 78
End: 2023-04-07
Payer: MEDICARE

## 2023-04-07 DIAGNOSIS — I50.20 HFREF (HEART FAILURE WITH REDUCED EJECTION FRACTION): Primary | ICD-10-CM

## 2023-04-07 DIAGNOSIS — I25.10 CORONARY ARTERY DISEASE INVOLVING NATIVE CORONARY ARTERY OF NATIVE HEART WITHOUT ANGINA PECTORIS: ICD-10-CM

## 2023-04-07 DIAGNOSIS — I47.20 VENTRICULAR TACHYARRHYTHMIA: ICD-10-CM

## 2023-04-07 NOTE — TELEPHONE ENCOUNTER
Patient's daughter called and stated that he saw you last Friday and discussed about putting in a cardiac rehab referral. I did not see referral. Patient daughter also stated that she wants to go to [facility] and not [facility] rehab.     Please out in referral  Information is below.   Phone # [phone]      [facility]

## 2023-04-11 ENCOUNTER — READMISSION MANAGEMENT (OUTPATIENT)
Dept: CALL CENTER | Facility: HOSPITAL | Age: 78
End: 2023-04-11
Payer: MEDICARE

## 2023-04-11 NOTE — OUTREACH NOTE
General Surgery Week 3 Survey    Flowsheet Row Responses   [facility] patient discharged from? [place]   Does the patient have one of the following disease processes/diagnoses(primary or secondary)? General Surgery   Week 3 attempt successful? No   Unsuccessful attempts Attempt 1          Brianda Upton Registered Nurse

## 2023-04-14 ENCOUNTER — READMISSION MANAGEMENT (OUTPATIENT)
Dept: CALL CENTER | Facility: HOSPITAL | Age: 78
End: 2023-04-14
Payer: MEDICARE

## 2023-04-14 NOTE — OUTREACH NOTE
"General Surgery Week 3 Survey    Flowsheet Row Responses   [facility] patient discharged from? [place]   Does the patient have one of the following disease processes/diagnoses(primary or secondary)? General Surgery   Week 3 attempt successful? Yes   Call start time 1702   Call end time 1707   Discharge diagnosis Ventricular tachyarrhythmia Third degree heart block  generator extraction and upgrade to CRT-D   Is the patient taking all medications as directed (includes completed medication regime)? Yes   Has the patient kept scheduled appointments due by today? Yes   Psychosocial issues? No   Comments pt reports continued dizziness/lightheaded, increased SOA w/exertion. Denies fever but pt reports area on chest incision that is \"as big as my hand\", over the wound. He denies temp change in that area or drainage. Advised to call Cardio, to return to ER if develop fever, drainage or other issue over the weekend.    What is the patient's perception of their health status since discharge? Worsening   Nursing interventions Nurse provided patient education, Advised patient to call provider   Is the patient /caregiver able to teach back basic post-op care? Practice 'cough and deep breath', Keep incision areas clean,dry and protected   Is the patient/caregiver able to teach back signs and symptoms of incisional infection? Increased redness, swelling or pain at the incisonal site, Increased drainage or bleeding, Incisional warmth, Pus or odor from incision, Fever   Is the patient/caregiver able to teach back steps to recovery at home? Weigh daily   Is the patient/caregiver able to teach back the hierarchy of who to call/visit for symptoms/problems? PCP, Specialist, Home health nurse, Urgent Care, ED, 911 Yes   Week 3 call completed? Yes          Bonnie HINDS - Registered Nurse  "

## 2023-05-01 ENCOUNTER — OFFICE VISIT (OUTPATIENT)
Dept: CARDIOLOGY | Facility: CLINIC | Age: 78
End: 2023-05-01
Payer: MEDICARE

## 2023-05-01 ENCOUNTER — CLINICAL SUPPORT NO REQUIREMENTS (OUTPATIENT)
Dept: CARDIOLOGY | Facility: CLINIC | Age: 78
End: 2023-05-01
Payer: MEDICARE

## 2023-05-01 VITALS
WEIGHT: 230 LBS | HEART RATE: 79 BPM | BODY MASS INDEX: 32.2 KG/M2 | HEIGHT: 71 IN | SYSTOLIC BLOOD PRESSURE: 112 MMHG | DIASTOLIC BLOOD PRESSURE: 82 MMHG

## 2023-05-01 DIAGNOSIS — I44.2 THIRD DEGREE HEART BLOCK: Primary | ICD-10-CM

## 2023-05-01 DIAGNOSIS — I25.5 ISCHEMIC CARDIOMYOPATHY: ICD-10-CM

## 2023-05-01 DIAGNOSIS — I25.5 ISCHEMIC CARDIOMYOPATHY: Primary | ICD-10-CM

## 2023-05-01 RX ORDER — SPIRONOLACTONE 25 MG/1
0.5 TABLET ORAL DAILY
COMMUNITY
Start: 2023-04-18

## 2023-05-01 RX ORDER — CHLORAL HYDRATE 500 MG
1 CAPSULE ORAL DAILY
COMMUNITY

## 2023-05-01 NOTE — PROGRESS NOTES
Date of Office Visit: 2023  Encounter Provider: STEVE Frankel  Place of Service: [facility] CARDIOLOGY  Patient Name: Orion Zepeda  :1945    Chief Complaint   Patient presents with   • ventricular tachycardia   • third degree heart block   • Cardiomyopathy   • Syncope   • Pacemaker Check   :     HPI: Orion Zepeda is a 77 y.o. male who is followed by Dr. Gordon.  He has a history of dyspnea and complete heart block---s/p DC PPM (2019).      He saw Dr. Gordon in March. He was doing okay at that time but still complaining of shortness of breath that worsened with activity.  He was pacing 99%. Echo was ordered at that time which showed LVEF 45-49%.  He was advised to follow up in 6 months.       He continued to complain of worsening dyspnea and dizziness. Another echo was repeated on 3/07/2023 showing severe depression of LVEF to 20% with dyssynchrony. 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 He underwent heart cath showing no obstructive disease. We recommended upgrade to CRT-D.      On 3/17/2023 we received alert from his device for possible VT. We reviewed the EGMs and could not rule out VT and given his severely depressed EF we admitted him on 3/17 and loaded with amiodarone. On 3/23 he underwent RV lead and generator extraction and upgrade to CRT-D. Difficult anatomy made LV lead placement difficult but ended up placing left bundle lead.                       He presents today for follow up appt. He is here today with his daughter in law.     He says for a few days following the procedure he felt better. Less shortness of breath. A little more energy.     Now feels like his fatigue and shortness of breath are about the same as before upgrade.     Normal device testing and function in office today. CRT pacing is 100%. LB capture is okay, QRS 160ms.     Echo before upgrade, EF about 20%.     He is on GDMT. Losartan was  stopped, he was started on entresto and aldactone. Also on coreg.     He starts cardiac rehab next week.     Continues to have issues with dizziness. His BP is well controlled. No significant hypotension.     He has a lot of sinus issues and follows with ENT. He says dizziness is worse when doing nasal spray in the morning. He has upcoming appt with ENT.     Reviewed labs last week from PCP. Creatinine is slightly elevated at 1.3 but rest of labs are okay. BNP was normal.     Past Medical History:   Diagnosis Date   • Arthritis    • Complete heart block 2019   • Coronary artery disease    • Dislocated shoulder, right, subsequent encounter 2011   • History of staph infection     AFTER SHOULDER SURGERY IN THE 70'S   • Hyperlipidemia    • Nasal septal deviation    • Nasal turbinate hypertrophy    • SOB (shortness of breath) on exertion    • Syncope 2018       Past Surgical History:   Procedure Laterality Date   • CARDIAC CATHETERIZATION N/A 3/13/2023    Procedure: Left Heart Cath;  Surgeon: Rod Ash MD;  Location: [facility] CATH INVASIVE LOCATION;  Service: Cardiology;  Laterality: N/A;   • CARDIAC ELECTROPHYSIOLOGY PROCEDURE N/A 05/05/2019    Procedure: Temporary Pacemaker;  Surgeon: Vonnie Jeffries MD;  Location: [facility] CATH INVASIVE LOCATION;  Service: Cardiology   • CARDIAC ELECTROPHYSIOLOGY PROCEDURE N/A 05/06/2019    Procedure: Pacemaker DC new  [place];  Surgeon: Bryn Gordon MD;  Location: [facility] CATH INVASIVE LOCATION;  Service: Cardiovascular   • CARDIAC ELECTROPHYSIOLOGY PROCEDURE N/A 05/05/2019    Procedure: Temporary Pacemaker- Reposition;  Surgeon: Vonnie Jeffries MD;  Location: [facility] CATH INVASIVE LOCATION;  Service: Cardiology   • DIAGNOSTIC LAPAROSCOPY N/A 09/15/2019    Procedure: DIAGNOSTIC LAPAROSCOPY;  Surgeon: Sonya Sheikh MD;  Location: [facility] MAIN OR;  Service: General   • IMPLANTABLE CARDIOVERTER DEFIBRILLATOR LEAD REPLACEMENT/POCKET REVISION N/A 3/23/2023    Procedure:  LASER PPM lead extraction transvenous; AICD LEAD IMPLANT, CRT PLACEMENT, WITH GENERATOR IMPLANT;  Surgeon: Bryn Gordon MD;  Location: [facility];  Service: Cardiology;  Laterality: N/A;  removal of current pacemaker generator and leads. reimplant Biventricular ICD-- Hostetter   • INGUINAL HERNIA REPAIR N/A 09/15/2019    Procedure: left INGUINAL HERNIA REPAIR LAPAROSCOPIC, REDUCTION OF SMALL BOWEL;  Surgeon: Sonya Sheikh MD;  Location: [facility] OR;  Service: General   • SEPTOPLASTY, RESECTION INFERIOR TURBINATES N/A 2022    Procedure: SEPTOPLASTY, SUBMUCOUS RESECTION INFERIOR TURBINATES, NASAL ENDOSCOPY and ENDOSCOPIC POLYPECTOMY;  Surgeon: Nj Peterson MD;  Location: [facility];  Service: ENT;  Laterality: N/A;   • SHOULDER SURGERY Left     in his 20s; ligamentous surgery       Social History     Socioeconomic History   • Marital status:    • Number of children: 2   Tobacco Use   • Smoking status: Former     Packs/day: 1.00     Years: 50.00     Pack years: 50.00     Types: Cigarettes     Quit date:      Years since quittin.3   • Smokeless tobacco: Never   Vaping Use   • Vaping Use: Never used   Substance and Sexual Activity   • Alcohol use: Never   • Drug use: Yes     Types: Marijuana     Comment: USES 3-4 TIMES WEEK   • Sexual activity: Defer       Family History   Problem Relation Age of Onset   • Stroke Mother    • Tuberculosis Mother    • Lung cancer Father    • Rheumatic fever Brother    • Malig Hyperthermia Neg Hx        Review of Systems   Constitutional: Positive for malaise/fatigue. Negative for chills and fever.   Cardiovascular: Positive for dyspnea on exertion. Negative for chest pain, leg swelling, near-syncope, orthopnea, palpitations, paroxysmal nocturnal dyspnea and syncope.   Respiratory: Negative for cough and shortness of breath.    Hematologic/Lymphatic: Negative.    Musculoskeletal: Negative for joint pain, joint swelling and myalgias.  "  Gastrointestinal: Negative for abdominal pain, diarrhea, melena, nausea and vomiting.   Genitourinary: Negative for frequency and hematuria.   Neurological: Positive for dizziness. Negative for light-headedness, numbness, paresthesias and seizures.   Allergic/Immunologic: Negative.    All other systems reviewed and are negative.      No Known Allergies      Current Outpatient Medications:   •  albuterol sulfate  (90 Base) MCG/ACT inhaler, Inhale 2 puffs Every 4 (Four) Hours As Needed for Wheezing or Shortness of Air., Disp: 18 g, Rfl: 1  •  atorvastatin (Lipitor) 40 MG tablet, Take 1 tablet by mouth Daily., Disp: 90 tablet, Rfl: 1  •  carvedilol (COREG) 6.25 MG tablet, Take 1 tablet by mouth 2 (Two) Times a Day With Meals., Disp: 60 tablet, Rfl: 2  •  furosemide (LASIX) 40 MG tablet, Take 1 tablet by mouth Daily., Disp: 30 tablet, Rfl: 2  •  multivitamin with minerals tablet tablet, Take 1 tablet by mouth Daily., Disp: , Rfl:   •  Omega-3 Fatty Acids (fish oil) 1000 MG capsule capsule, Take 1 capsule by mouth Daily., Disp: , Rfl:   •  sacubitril-valsartan (ENTRESTO) 24-26 MG tablet, Take 1 tablet by mouth 2 (Two) Times a Day., Disp: 56 tablet, Rfl: 0  •  sodium chloride 0.65 % nasal spray, 1 spray into the nostril(s) as directed by provider As Needed for Congestion., Disp: , Rfl:   •  spironolactone (ALDACTONE) 25 MG tablet, Take 0.5 tablets by mouth Daily., Disp: , Rfl:       Objective:     Vitals:    05/01/23 1422   BP: 112/82   Pulse: 79   Weight: 104 kg (230 lb)   Height: 180.3 cm (71\")     Body mass index is 32.08 kg/m².    PHYSICAL EXAM:    Vitals Reviewed.   General Appearance: No acute distress, well developed and well nourished.   Eyes: Conjunctiva and lids: No erythema, swelling, or discharge. Sclera non-icteric.   HENT: Atraumatic, normocephalic. External eyes, ears, and nose normal.   Respiratory: No signs of respiratory distress. Respiration rhythm and depth normal.   Clear to auscultation. No " rales, crackles, rhonchi, or wheezing auscultated.   Cardiovascular:  Heart Rate and Rhythm: Normal, Heart Sounds: Normal S1 and S2. No S3 or S4 noted.  Gastrointestinal:  Abdomen soft, non-distended, non-tender.   Musculoskeletal: Normal movement of extremities  Skin: Warm and dry.   Psychiatric: Patient alert and oriented to person, place, and time. Speech and behavior appropriate. Normal mood and affect.       ECG 12 Lead    Date/Time: 5/1/2023 3:34 PM  Performed by: Adonis Rojo APRN  Authorized by: Adonis Rojo APRN   Comparison: compared with previous ECG   Similar to previous ECG  Rhythm: paced  BPM: 79                Assessment:       Diagnosis Plan   1. Third degree heart block  Adult Transthoracic Echo Complete w/ Color, Spectral and Contrast if Necessary Per Protocol      2. Ischemic cardiomyopathy  Adult Transthoracic Echo Complete w/ Color, Spectral and Contrast if Necessary Per Protocol             Plan:   1-2. CHB--s/p DC PPM--pacemaker mediated CM (EF-20%)---s/p CRT upgrade (3/2023)---- he had device upgrade last month. Difficult time with lead placement so placed LB lead. Normal device testing and function in office today. LB capture is okay, QRS 160ms. He has not noted significant improvement in symptoms. On GDMT with entresto, BB, lasix, and aldactone. EF prior was 20%.     He continues to have some dizziness. Device is okay. BP at home is okay with no significant hypotension. I wonder if a lot of this is related to his sinus trouble, asked him to discuss with ENT.         We will plan to follow up in 3 months with repeat echo at that time. If he is not responding to CRT then may have to consider settings adjustment or revision, had difficult time placing lead.           Follow up with Dr. Gordon in 3 months.           As always, it has been a pleasure to participate in your patient's care.      Sincerely,         STEVE Frankel

## 2023-05-10 ENCOUNTER — OFFICE VISIT (OUTPATIENT)
Dept: INTERNAL MEDICINE | Facility: CLINIC | Age: 78
End: 2023-05-10
Payer: MEDICARE

## 2023-05-10 ENCOUNTER — HOSPITAL ENCOUNTER (OUTPATIENT)
Dept: CARDIOLOGY | Facility: HOSPITAL | Age: 78
Discharge: HOME | End: 2023-05-10
Admitting: STUDENT IN AN ORGANIZED HEALTH CARE EDUCATION/TRAINING PROGRAM
Payer: MEDICARE

## 2023-05-10 VITALS
OXYGEN SATURATION: 97 % | BODY MASS INDEX: 32.62 KG/M2 | SYSTOLIC BLOOD PRESSURE: 128 MMHG | WEIGHT: 233 LBS | HEART RATE: 101 BPM | HEIGHT: 71 IN | DIASTOLIC BLOOD PRESSURE: 86 MMHG | TEMPERATURE: 95.8 F

## 2023-05-10 DIAGNOSIS — E78.2 MIXED HYPERLIPIDEMIA: ICD-10-CM

## 2023-05-10 DIAGNOSIS — M79.662 PAIN OF LEFT CALF: ICD-10-CM

## 2023-05-10 DIAGNOSIS — Z87.891 PERSONAL HISTORY OF NICOTINE DEPENDENCE: ICD-10-CM

## 2023-05-10 DIAGNOSIS — I25.10 CORONARY ARTERY DISEASE INVOLVING NATIVE CORONARY ARTERY OF NATIVE HEART WITHOUT ANGINA PECTORIS: ICD-10-CM

## 2023-05-10 DIAGNOSIS — Z00.00 MEDICARE ANNUAL WELLNESS VISIT, SUBSEQUENT: Primary | ICD-10-CM

## 2023-05-10 LAB
BH CV LOWER VASCULAR LEFT COMMON FEMORAL AUGMENT: NORMAL
BH CV LOWER VASCULAR LEFT COMMON FEMORAL COMPETENT: NORMAL
BH CV LOWER VASCULAR LEFT COMMON FEMORAL COMPRESS: NORMAL
BH CV LOWER VASCULAR LEFT COMMON FEMORAL PHASIC: NORMAL
BH CV LOWER VASCULAR LEFT COMMON FEMORAL SPONT: NORMAL
BH CV LOWER VASCULAR LEFT DISTAL FEMORAL COMPRESS: NORMAL
BH CV LOWER VASCULAR LEFT GASTRONEMIUS COMPRESS: NORMAL
BH CV LOWER VASCULAR LEFT GREATER SAPH AK COMPRESS: NORMAL
BH CV LOWER VASCULAR LEFT GREATER SAPH BK COMPRESS: NORMAL
BH CV LOWER VASCULAR LEFT LESSER SAPH COMPRESS: NORMAL
BH CV LOWER VASCULAR LEFT MID FEMORAL AUGMENT: NORMAL
BH CV LOWER VASCULAR LEFT MID FEMORAL COMPETENT: NORMAL
BH CV LOWER VASCULAR LEFT MID FEMORAL COMPRESS: NORMAL
BH CV LOWER VASCULAR LEFT MID FEMORAL PHASIC: NORMAL
BH CV LOWER VASCULAR LEFT MID FEMORAL SPONT: NORMAL
BH CV LOWER VASCULAR LEFT PERONEAL COMPRESS: NORMAL
BH CV LOWER VASCULAR LEFT POPLITEAL AUGMENT: NORMAL
BH CV LOWER VASCULAR LEFT POPLITEAL COMPETENT: NORMAL
BH CV LOWER VASCULAR LEFT POPLITEAL COMPRESS: NORMAL
BH CV LOWER VASCULAR LEFT POPLITEAL PHASIC: NORMAL
BH CV LOWER VASCULAR LEFT POPLITEAL SPONT: NORMAL
BH CV LOWER VASCULAR LEFT POSTERIOR TIBIAL COMPRESS: NORMAL
BH CV LOWER VASCULAR LEFT PROFUNDA FEMORAL COMPRESS: NORMAL
BH CV LOWER VASCULAR LEFT PROXIMAL FEMORAL COMPRESS: NORMAL
BH CV LOWER VASCULAR LEFT SAPHENOFEMORAL JUNCTION COMPRESS: NORMAL
BH CV LOWER VASCULAR RIGHT COMMON FEMORAL AUGMENT: NORMAL
BH CV LOWER VASCULAR RIGHT COMMON FEMORAL COMPETENT: NORMAL
BH CV LOWER VASCULAR RIGHT COMMON FEMORAL COMPRESS: NORMAL
BH CV LOWER VASCULAR RIGHT COMMON FEMORAL PHASIC: NORMAL
BH CV LOWER VASCULAR RIGHT COMMON FEMORAL SPONT: NORMAL
BH CV VAS PRELIMINARY FINDINGS SCRIPTING: 1
MAXIMAL PREDICTED HEART RATE: 144 BPM
STRESS TARGET HR: 122 BPM

## 2023-05-10 PROCEDURE — 93971 EXTREMITY STUDY: CPT

## 2023-05-10 RX ORDER — ATORVASTATIN CALCIUM 80 MG/1
80 TABLET, FILM COATED ORAL DAILY
Qty: 90 TABLET | Refills: 1 | Status: SHIPPED | OUTPATIENT
Start: 2023-05-10

## 2023-05-10 NOTE — PROGRESS NOTES
The ABCs of the Annual Wellness Visit  Subsequent Medicare Wellness Visit    Subjective      Orion Zepeda is a 77 y.o. male who presents for a Subsequent Medicare Wellness Visit.    The following portions of the patient's history were reviewed and   updated as appropriate: allergies, current medications, past family history, past medical history, past social history, past surgical history and problem list.    HFrEF, complete heart block, CAD/HLD : has follolwed with [facility] but recently got a second opinion at [facility]. Taking atoravastatin 40 mg daily for lipid management. For HFrEF, he is taking carvedilol 6.25 mg twice daily, furosemide 40 mg daily , Entresto 24-26 mg twice daily, spironolactone 12.5 mg daily. On 3/23 he underwent RV lead and generator extraction and upgrade to CRT-D. States he starts cardiac rehab tomorrow.    Chronic sinusitis: follows with ENT, s/p septoplasty and resection of inferior nasal turbinates.     Prediabetes: last A1c 5.8 2/8/23    States for 4-5 days he has had left calf pain and pain behind his left knee, no known injuries but he has not been as mobile recently due to recent hospitalizations and not having much strength. He denies leg swelling.        Compared to one year ago, the patient feels his physical   health is worse.     Compared to one year ago, the patient feels his mental   health is the same.    Recent Hospitalizations:  This patient has had a [facility] admission record on file within the last 365 days.    Current Medical Providers:  Patient Care Team:  Bradley Retana DO as PCP - General (Internal Medicine)  Heyworth, Bryn Steven MD as Consulting Physician (Cardiology)    Outpatient Medications Prior to Visit   Medication Sig Dispense Refill   • albuterol sulfate  (90 Base) MCG/ACT inhaler Inhale 2 puffs Every 4 (Four) Hours As Needed for Wheezing or Shortness of Air. 18 g 1   • carvedilol (COREG) 6.25 MG tablet Take 1 tablet by  "mouth 2 (Two) Times a Day With Meals. 60 tablet 2   • furosemide (LASIX) 40 MG tablet Take 1 tablet by mouth Daily. 30 tablet 2   • multivitamin with minerals tablet tablet Take 1 tablet by mouth Daily.     • Omega-3 Fatty Acids (fish oil) 1000 MG capsule capsule Take 1 capsule by mouth Daily.     • sacubitril-valsartan (ENTRESTO) 24-26 MG tablet Take 1 tablet by mouth 2 (Two) Times a Day. 56 tablet 0   • sodium chloride 0.65 % nasal spray 1 spray into the nostril(s) as directed by provider As Needed for Congestion.     • spironolactone (ALDACTONE) 25 MG tablet Take 0.5 tablets by mouth Daily.     • atorvastatin (Lipitor) 40 MG tablet Take 1 tablet by mouth Daily. 90 tablet 1     No facility-administered medications prior to visit.       No opioid medication identified on active medication list. I have reviewed chart for other potential  high risk medication/s and harmful drug interactions in the elderly.          Aspirin is not on active medication list.  Aspirin use is indicated based on review of current medical condition/s. Pros and cons of this therapy have been discussed with this patient. Benefits of this medication outweigh potential harm.  Patient has been instructed to start taking this medication..    Patient Active Problem List   Diagnosis   • Third degree heart block   • Syncope   • Shortness of breath   • Ventricular tachyarrhythmia   • Cardiomyopathy   • Ischemic cardiomyopathy     Advance Care Planning   Advance Care Planning     Advance Directive is not on file.  ACP discussion was held with the patient during this visit. Patient has an advance directive (not in EMR), copy requested.     Objective    Vitals:    05/10/23 0736   BP: 128/86   Pulse: 101   Temp: 95.8 °F (35.4 °C)   TempSrc: Infrared   SpO2: 97%   Weight: 106 kg (233 lb)   Height: 180.3 cm (71\")     Physical Exam  Vitals reviewed.   Constitutional:       General: He is not in acute distress.     Appearance: Normal appearance. He is " "obese. He is not ill-appearing.   HENT:      Head: Atraumatic.      Right Ear: Tympanic membrane, ear canal and external ear normal. There is no impacted cerumen.      Left Ear: Tympanic membrane, ear canal and external ear normal. There is no impacted cerumen.      Mouth/Throat:      Mouth: Mucous membranes are moist.      Pharynx: Oropharynx is clear. No oropharyngeal exudate or posterior oropharyngeal erythema.   Eyes:      General: No scleral icterus.  Cardiovascular:      Rate and Rhythm: Normal rate and regular rhythm.      Heart sounds: Normal heart sounds. No murmur heard.  Pulmonary:      Effort: Pulmonary effort is normal. No respiratory distress.      Breath sounds: Normal breath sounds. No stridor. No wheezing or rhonchi.   Musculoskeletal:      Right lower leg: No edema.      Left lower leg: No edema.      Comments: Left knee without swelling or visible deformity. No tenderness to palpation along the left popliteal fossa or calf in the area of concern.    Skin:     Coloration: Skin is not jaundiced.   Neurological:      Mental Status: He is alert.   Psychiatric:         Mood and Affect: Mood normal.         Behavior: Behavior normal.         Thought Content: Thought content normal.           Estimated body mass index is 32.5 kg/m² as calculated from the following:    Height as of this encounter: 180.3 cm (71\").    Weight as of this encounter: 106 kg (233 lb).    BMI is >= 30 and <35. (Class 1 Obesity). The following options were offered after discussion;: exercise counseling/recommendations      Does the patient have evidence of cognitive impairment?   No            HEALTH RISK ASSESSMENT    Smoking Status:  Social History     Tobacco Use   Smoking Status Former   • Packs/day: 1.00   • Years: 50.00   • Pack years: 50.00   • Types: Cigarettes   • Quit date:    • Years since quittin.3   Smokeless Tobacco Never     Alcohol Consumption:  Social History     Substance and Sexual Activity   Alcohol Use " Never     Fall Risk Screen:    SUSYADI Fall Risk Assessment was completed, and patient is at LOW risk for falls.Assessment completed on:5/10/2023    Depression Screenin/10/2023     7:49 AM   PHQ-2/PHQ-9 Depression Screening   Little Interest or Pleasure in Doing Things 0-->not at all   Feeling Down, Depressed or Hopeless 0-->not at all   PHQ-9: Brief Depression Severity Measure Score 0       Health Habits and Functional and Cognitive Screenin/10/2023     7:47 AM   Functional & Cognitive Status   Do you have difficulty preparing food and eating? No   Do you have difficulty bathing yourself, getting dressed or grooming yourself? No   Do you have difficulty using the toilet? No   Do you have difficulty moving around from place to place? Yes   Do you have trouble with steps or getting out of a bed or a chair? Yes   Current Diet Well Balanced Diet   Dental Exam Not up to date   Eye Exam Not up to date   Exercise (times per week) 0 times per week   Current Exercises Include No Regular Exercise   Do you need help using the phone?  No   Are you deaf or do you have serious difficulty hearing?  No   Do you need help with transportation? Yes   Do you need help shopping? No   Do you need help preparing meals?  No   Do you need help with housework?  No   Do you need help with laundry? No   Do you need help taking your medications? No   Do you need help managing money? No   Do you ever drive or ride in a car without wearing a seat belt? Yes   Have you felt unusual stress, anger or loneliness in the last month? No   Who do you live with? Alone   If you need help, do you have trouble finding someone available to you? No   Do you have difficulty concentrating, remembering or making decisions? No       Age-appropriate Screening Schedule:  Refer to the list below for future screening recommendations based on patient's age, sex and/or medical conditions. Orders for these recommended tests are listed in the plan  section. The patient has been provided with a written plan.    Health Maintenance   Topic Date Due   • LUNG CANCER SCREENING  Never done   • INFLUENZA VACCINE  08/01/2023   • LIPID PANEL  08/08/2023   • ANNUAL WELLNESS VISIT  05/10/2024   • COLORECTAL CANCER SCREENING  05/18/2025   • TDAP/TD VACCINES (2 - Td or Tdap) 05/24/2032   • HEPATITIS C SCREENING  Completed   • COVID-19 Vaccine  Completed   • Pneumococcal Vaccine 65+  Completed   • ZOSTER VACCINE  Completed                  CMS Preventative Services Quick Reference  Risk Factors Identified During Encounter:    Inactivity/Sedentary: Patient was advised to exercise at least 150 minutes a week per CDC recommendations.  Dental Screening Recommended  Vision Screening Recommended  Will obtain screening CT chest lung cancer given smoking history. Risk and benefits discussed. He was agreeable to testing.    The above risks/problems have been discussed with the patient.  Pertinent information has been shared with the patient in the After Visit Summary.    Follow Up:   Next Medicare Wellness visit to be scheduled in 1 year.      An After Visit Summary and PPPS were made available to the patient.    A problem-based visit was also conducted on the same day, see below for assessment and plan    Diagnoses and all orders for this visit:    1. Coronary artery disease involving native coronary artery of native heart without angina pectoris (Primary)  2. Mixed hyperlipidemia  -mild CAD on 3/2023 cardiac cath, I reviewed the report  -currently taking atorvastatin 40 mg daily, not taking aspirin 81 mg daily  Lab Results   Component Value Date    CHLPL 163 08/08/2022    TRIG 142 08/08/2022    HDL 42 08/08/2022    LDL 96 08/08/2022     -last lipid not at goal LDL less than 70  -will increase atorvastatin to 80 mg daily  -recommend he add aspirin 81 mg daily as well   -     atorvastatin (Lipitor) 80 MG tablet; Take 1 tablet by mouth Daily.  Dispense: 90 tablet; Refill: 1    3. Pain  of left calf  -States for 4-5 days he has had left calf pain and pain behind his left knee, no known injuries but he has not been as mobile recently due to recent hospitalizations and not having much strength. He denies leg swelling.    -overall low suspicion for DVT but given recent hospitalization and decreased mobility will obtain duplex U/S of LLE  -     Duplex Venous Lower Extremity - Left CAR; Future            The following social determinates of health impact the patient's medical decision making: No social determinates of health were factored in to today's visit.     Follow Up  Return in about 3 months (around 8/10/2023) for Recheck.

## 2023-08-11 ENCOUNTER — OFFICE VISIT (OUTPATIENT)
Dept: INTERNAL MEDICINE | Facility: CLINIC | Age: 78
End: 2023-08-11
Payer: MEDICARE

## 2023-08-11 VITALS
BODY MASS INDEX: 32.34 KG/M2 | SYSTOLIC BLOOD PRESSURE: 118 MMHG | HEIGHT: 71 IN | DIASTOLIC BLOOD PRESSURE: 70 MMHG | WEIGHT: 231 LBS | HEART RATE: 80 BPM | OXYGEN SATURATION: 97 %

## 2023-08-11 DIAGNOSIS — R42 DIZZINESS: ICD-10-CM

## 2023-08-11 DIAGNOSIS — R26.89 BALANCE PROBLEM: ICD-10-CM

## 2023-08-11 DIAGNOSIS — I50.20 HFREF (HEART FAILURE WITH REDUCED EJECTION FRACTION): ICD-10-CM

## 2023-08-11 DIAGNOSIS — E78.2 MIXED HYPERLIPIDEMIA: ICD-10-CM

## 2023-08-11 DIAGNOSIS — I25.10 CORONARY ARTERY DISEASE INVOLVING NATIVE CORONARY ARTERY OF NATIVE HEART WITHOUT ANGINA PECTORIS: Primary | ICD-10-CM

## 2023-08-11 LAB
CHOLEST SERPL-MCNC: 164 MG/DL (ref 0–200)
HDLC SERPL-MCNC: 46 MG/DL (ref 40–60)
LDLC SERPL CALC-MCNC: 96 MG/DL (ref 0–100)
LDLC/HDLC SERPL: 2.04 {RATIO}
TRIGL SERPL-MCNC: 121 MG/DL (ref 0–150)
VLDLC SERPL CALC-MCNC: 22 MG/DL (ref 5–40)

## 2023-08-11 PROCEDURE — 1160F RVW MEDS BY RX/DR IN RCRD: CPT | Performed by: STUDENT IN AN ORGANIZED HEALTH CARE EDUCATION/TRAINING PROGRAM

## 2023-08-11 PROCEDURE — 1159F MED LIST DOCD IN RCRD: CPT | Performed by: STUDENT IN AN ORGANIZED HEALTH CARE EDUCATION/TRAINING PROGRAM

## 2023-08-11 PROCEDURE — 99214 OFFICE O/P EST MOD 30 MIN: CPT | Performed by: STUDENT IN AN ORGANIZED HEALTH CARE EDUCATION/TRAINING PROGRAM

## 2023-08-11 RX ORDER — SACUBITRIL AND VALSARTAN 24; 26 MG/1; MG/1
1 TABLET, FILM COATED ORAL 2 TIMES DAILY
COMMUNITY

## 2023-08-11 RX ORDER — TRAZODONE HYDROCHLORIDE 50 MG/1
50 TABLET ORAL NIGHTLY
COMMUNITY

## 2023-08-11 RX ORDER — ATORVASTATIN CALCIUM 80 MG/1
80 TABLET, FILM COATED ORAL DAILY
Qty: 90 TABLET | Refills: 1 | Status: SHIPPED | OUTPATIENT
Start: 2023-08-11

## 2023-08-11 RX ORDER — CARVEDILOL 12.5 MG/1
12.5 TABLET ORAL 2 TIMES DAILY WITH MEALS
COMMUNITY

## 2023-08-11 NOTE — PROGRESS NOTES
Bradley Retana D.O.  Internal Medicine  [facility]  [address], [address]  [place] [address]  [phone]      Chief Complaint  Hyperlipidemia    SUBJECTIVE    History of Present Illness    Orion Zepeda is a 78 y.o. male who presents to the office today as an established patient that last saw me on 5/10/2023.     HFrEF, complete heart block, CAD/HLD : has follolwed with [facility] but recently got a second opinion at [facility]. Taking atoravastatin 80 mg daily for lipid management (increased from 40 mg daily at lat visit). For HFrEF, he is taking carvedilol 12.5 mg twice daily, Entresto 24-26 mg twice daily, spironolactone 12.5 mg daily, dapagliflpozin 10 mg daily. On 3/23 he underwent RV lead and generator extraction and upgrade to CRT-D. Recently completed cardiac rehab at [facility]. Takes aspirin 81 mg daily.  Feels that he still has balance issues. States he deals with no swelling in the legs. Checks BP 3 times daily , 120s/70-80s, can also see 90s/50s at night.     No Known Allergies     Outpatient Medications Marked as Taking for the 8/11/23 encounter (Office Visit) with Bradley Retana, DO   Medication Sig Dispense Refill    atorvastatin (Lipitor) 80 MG tablet Take 1 tablet by mouth Daily. 90 tablet 1    carvedilol (COREG) 12.5 MG tablet Take 1 tablet by mouth 2 (Two) Times a Day With Meals.      dapagliflozin Propanediol 10 MG tablet Take 10 mg by mouth Daily.      multivitamin with minerals tablet tablet Take 1 tablet by mouth Daily.      Omega-3 Fatty Acids (fish oil) 1000 MG capsule capsule Take 1 capsule by mouth Daily.      sacubitril-valsartan (Entresto) 24-26 MG tablet Take 1 tablet by mouth 2 (Two) Times a Day.      sodium chloride 0.65 % nasal spray 1 spray into the nostril(s) as directed by provider As Needed for Congestion.      spironolactone (ALDACTONE) 25 MG tablet Take 0.5 tablets by mouth Daily.      traZODone (DESYREL) 50 MG tablet Take 1 tablet  "by mouth Every Night.      [DISCONTINUED] atorvastatin (Lipitor) 80 MG tablet Take 1 tablet by mouth Daily. 90 tablet 1    [DISCONTINUED] carvedilol (COREG) 6.25 MG tablet Take 1 tablet by mouth 2 (Two) Times a Day With Meals. 60 tablet 2        Past Medical History:   Diagnosis Date    Arthritis     Complete heart block 2019    Coronary artery disease     Dislocated shoulder, right, subsequent encounter 2011    Emphysema of lung     History of staph infection     AFTER SHOULDER SURGERY IN THE 70'S    Hyperlipidemia     Lung nodule     Nasal septal deviation     Nasal turbinate hypertrophy     Prediabetes     SOB (shortness of breath) on exertion     Syncope 2018       OBJECTIVE    Vital Signs:   /70   Pulse 80   Ht 180.3 cm (71\")   Wt 105 kg (231 lb)   SpO2 97%   BMI 32.22 kg/mý     Physical Exam  Vitals reviewed.   Constitutional:       General: He is not in acute distress.     Appearance: Normal appearance. He is not ill-appearing.   HENT:      Head: Atraumatic.      Right Ear: Tympanic membrane, ear canal and external ear normal. There is no impacted cerumen.      Left Ear: Tympanic membrane, ear canal and external ear normal. There is no impacted cerumen.      Ears:      Comments: Reports some dizziness with left kady hallpike maneuver   Eyes:      General: No scleral icterus.  Cardiovascular:      Rate and Rhythm: Normal rate and regular rhythm.      Heart sounds: Normal heart sounds. No murmur heard.  Pulmonary:      Effort: Pulmonary effort is normal. No respiratory distress.      Breath sounds: Normal breath sounds. No stridor. No wheezing or rhonchi.   Musculoskeletal:      Right lower leg: No edema.      Left lower leg: No edema.      Comments: Ambulating with walker   Neurological:      Mental Status: He is alert.   Psychiatric:         Mood and Affect: Mood normal.         Behavior: Behavior normal.         Thought Content: Thought content normal.                           ASSESSMENT & PLAN "     Diagnoses and all orders for this visit:    1. Coronary artery disease involving native coronary artery of native heart without angina pectoris (Primary)  2. Mixed hyperlipidemia  3. HFrEF (heart failure with reduced ejection fraction)  -has follolwed with [facility] but recently got a second opinion at [facility]. Taking atoravastatin 80 mg daily for lipid management (increased from 40 mg daily at lat visit). For HFrEF, he is taking carvedilol 12.5 mg twice daily, Entresto 24-26 mg twice daily, spironolactone 12.5 mg daily, dapagliflpozin 10 mg daily. On 3/23 he underwent RV lead and generator extraction and upgrade to CRT-D. Recently completed cardiac rehab at [facility]. Takes aspirin 81 mg daily.  Feels that he still has balance issues. States he deals with no swelling in the legs. Checks BP 3 times daily , 120s/70-80s, can also see 90s/50s at night.  -he denies symptoms when his BP drops to the 90s/50s, actually states he feels better during those times   Lab Results   Component Value Date    CHLPL 163 08/08/2022    TRIG 142 08/08/2022    HDL 42 08/08/2022    LDL 96 08/08/2022   -I reviewed most recent progress note from [facility]  -will recheck lipid profile today to assess for improvement to LDL less than 70 with increased atorvastatin     4. Dizziness  5. Balance problem  -questionably positive kady hallpike on the left today, also seems to have some generalize gait imbalance and is ambulating with walker toady  -will refer to PT for gait training/eval as well as vestibular rehab   -     Ambulatory Referral to Physical Therapy Evaluate and treat, Vestibular            The following social determinates of health impact the patient's medical decision making: No social determinates of health were factored in to today's visit.     Follow Up  Return in about 3 months (around 11/11/2023) for Recheck.    Patient/family had no further questions at this time and verbalized understanding of the  plan discussed today.

## 2023-08-13 RX ORDER — EZETIMIBE 10 MG/1
10 TABLET ORAL DAILY
Qty: 90 TABLET | Refills: 1 | Status: SHIPPED | OUTPATIENT
Start: 2023-08-13

## 2023-08-15 ENCOUNTER — TELEPHONE (OUTPATIENT)
Dept: CARDIOLOGY | Facility: CLINIC | Age: 78
End: 2023-08-15

## 2023-08-15 NOTE — TELEPHONE ENCOUNTER
I spoke to the pt's daughter who stated that while at cardiac rehab the pt had an EKG. The pt's daughter states that the cardiologist (who stated that they specialized in EP) told them the pt needed to have their leads tested and that a lead may be loose. The pt has a CRTD that was upgraded in March from a pacemaker. The pt is feeling OK, but was worried when told this. Their monitor is connected and I checked their most recent report which does not indicate any issues with the leads. The presenting strip does not show any indications of non-capture and the auto testing results do not show any acute changes (strip below). I have scheduled the pt to come in for testing this Thursday at 11.   Kindly,   Meg Schotanus Device RN

## 2023-08-15 NOTE — TELEPHONE ENCOUNTER
Caller: MORALES QUAN    Relationship: Child    Best call back number: [phone]    What is the best time to reach you: ANYTIME    Who are you requesting to speak with (clinical staff, provider,  specific staff member): CLINICAL    What was the call regarding: PT'S DAUGHTER IS CALLING TO SEE IF SHE CAN SCHEDULE AN APPT WITH DR. RODRIGUEZ OR STEVE EDWARDS TO SEE IF ONE OF THE PT'S LEADS ARE LOOSE. SHE SAID HE WENT TO ANOTHER DOCTOR AND THEY SAID THAT THEY THINK ONE OF THE LEADS ON HIS AICD IS LOOSE.    Is it okay if the provider responds through MyChart: NO

## 2023-09-19 ENCOUNTER — HOSPITAL ENCOUNTER (OUTPATIENT)
Dept: CARDIOLOGY | Facility: HOSPITAL | Age: 78
Discharge: HOME | End: 2023-09-19
Payer: MEDICARE

## 2023-09-19 ENCOUNTER — CLINICAL SUPPORT NO REQUIREMENTS (OUTPATIENT)
Dept: CARDIOLOGY | Facility: CLINIC | Age: 78
End: 2023-09-19
Payer: MEDICARE

## 2023-09-19 ENCOUNTER — OFFICE VISIT (OUTPATIENT)
Dept: CARDIOLOGY | Facility: CLINIC | Age: 78
End: 2023-09-19
Payer: MEDICARE

## 2023-09-19 VITALS
SYSTOLIC BLOOD PRESSURE: 118 MMHG | BODY MASS INDEX: 33.33 KG/M2 | WEIGHT: 238.1 LBS | HEART RATE: 75 BPM | DIASTOLIC BLOOD PRESSURE: 62 MMHG | HEIGHT: 71 IN

## 2023-09-19 VITALS
WEIGHT: 238 LBS | BODY MASS INDEX: 33.32 KG/M2 | HEIGHT: 71 IN | HEART RATE: 81 BPM | DIASTOLIC BLOOD PRESSURE: 72 MMHG | SYSTOLIC BLOOD PRESSURE: 104 MMHG

## 2023-09-19 DIAGNOSIS — I25.5 ISCHEMIC CARDIOMYOPATHY: ICD-10-CM

## 2023-09-19 DIAGNOSIS — I25.5 ISCHEMIC CARDIOMYOPATHY: Primary | ICD-10-CM

## 2023-09-19 DIAGNOSIS — I44.2 THIRD DEGREE HEART BLOCK: Primary | ICD-10-CM

## 2023-09-19 DIAGNOSIS — I44.2 THIRD DEGREE HEART BLOCK: ICD-10-CM

## 2023-09-19 LAB
AORTIC DIMENSIONLESS INDEX: 0.7 (DI)
ASCENDING AORTA: 3.2 CM
BH CV ECHO LEFT VENTRICLE GLOBAL LONGITUDINAL STRAIN: -11 %
BH CV ECHO MEAS - ACS: 2.33 CM
BH CV ECHO MEAS - AO MAX PG: 7.2 MMHG
BH CV ECHO MEAS - AO MEAN PG: 3.8 MMHG
BH CV ECHO MEAS - AO ROOT DIAM: 3.6 CM
BH CV ECHO MEAS - AO V2 MAX: 134.4 CM/SEC
BH CV ECHO MEAS - AO V2 VTI: 25.9 CM
BH CV ECHO MEAS - AVA(I,D): 1.98 CM2
BH CV ECHO MEAS - EDV(CUBED): 60.7 ML
BH CV ECHO MEAS - EDV(MOD-SP2): 140 ML
BH CV ECHO MEAS - EDV(MOD-SP4): 123 ML
BH CV ECHO MEAS - EF(MOD-BP): 30.9 %
BH CV ECHO MEAS - EF(MOD-SP2): 33.6 %
BH CV ECHO MEAS - EF(MOD-SP4): 29.3 %
BH CV ECHO MEAS - ESV(CUBED): 40.5 ML
BH CV ECHO MEAS - ESV(MOD-SP2): 93 ML
BH CV ECHO MEAS - ESV(MOD-SP4): 87 ML
BH CV ECHO MEAS - FS: 12.6 %
BH CV ECHO MEAS - IVS/LVPW: 1.03 CM
BH CV ECHO MEAS - IVSD: 1.61 CM
BH CV ECHO MEAS - LAT PEAK E' VEL: 11.7 CM/SEC
BH CV ECHO MEAS - LV DIASTOLIC VOL/BSA (35-75): 54.2 CM2
BH CV ECHO MEAS - LV MASS(C)D: 246.8 GRAMS
BH CV ECHO MEAS - LV MAX PG: 4.2 MMHG
BH CV ECHO MEAS - LV MEAN PG: 2.32 MMHG
BH CV ECHO MEAS - LV SYSTOLIC VOL/BSA (12-30): 38.4 CM2
BH CV ECHO MEAS - LV V1 MAX: 101.9 CM/SEC
BH CV ECHO MEAS - LV V1 VTI: 17.8 CM
BH CV ECHO MEAS - LVIDD: 3.9 CM
BH CV ECHO MEAS - LVIDS: 3.4 CM
BH CV ECHO MEAS - LVOT AREA: 2.9 CM2
BH CV ECHO MEAS - LVOT DIAM: 1.92 CM
BH CV ECHO MEAS - LVPWD: 1.55 CM
BH CV ECHO MEAS - MED PEAK E' VEL: 5.3 CM/SEC
BH CV ECHO MEAS - MR MAX PG: 10.4 MMHG
BH CV ECHO MEAS - MR MAX VEL: 161.5 CM/SEC
BH CV ECHO MEAS - MV A DUR: 0.11 SEC
BH CV ECHO MEAS - MV A MAX VEL: 74.1 CM/SEC
BH CV ECHO MEAS - MV DEC SLOPE: 132.4 CM/SEC2
BH CV ECHO MEAS - MV DEC TIME: 0.3 SEC
BH CV ECHO MEAS - MV E MAX VEL: 61.3 CM/SEC
BH CV ECHO MEAS - MV E/A: 0.83
BH CV ECHO MEAS - MV MAX PG: 1.92 MMHG
BH CV ECHO MEAS - MV MEAN PG: 0.92 MMHG
BH CV ECHO MEAS - MV P1/2T: 144.1 MSEC
BH CV ECHO MEAS - MV V2 VTI: 20.7 CM
BH CV ECHO MEAS - MVA(P1/2T): 1.53 CM2
BH CV ECHO MEAS - MVA(VTI): 2.48 CM2
BH CV ECHO MEAS - PA ACC TIME: 0.13 SEC
BH CV ECHO MEAS - PA V2 MAX: 93.7 CM/SEC
BH CV ECHO MEAS - PULM A REVS DUR: 0.14 SEC
BH CV ECHO MEAS - PULM A REVS VEL: 27.5 CM/SEC
BH CV ECHO MEAS - PULM DIAS VEL: 31.8 CM/SEC
BH CV ECHO MEAS - PULM S/D: 0.96
BH CV ECHO MEAS - PULM SYS VEL: 30.5 CM/SEC
BH CV ECHO MEAS - QP/QS: 1.28
BH CV ECHO MEAS - RAP SYSTOLE: 8 MMHG
BH CV ECHO MEAS - RV MAX PG: 2.6 MMHG
BH CV ECHO MEAS - RV V1 MAX: 80.1 CM/SEC
BH CV ECHO MEAS - RV V1 VTI: 15 CM
BH CV ECHO MEAS - RVOT DIAM: 2.36 CM
BH CV ECHO MEAS - RVSP: 18 MMHG
BH CV ECHO MEAS - SI(MOD-SP2): 20.7 ML/M2
BH CV ECHO MEAS - SI(MOD-SP4): 15.9 ML/M2
BH CV ECHO MEAS - SV(LVOT): 51.4 ML
BH CV ECHO MEAS - SV(MOD-SP2): 47 ML
BH CV ECHO MEAS - SV(MOD-SP4): 36 ML
BH CV ECHO MEAS - SV(RVOT): 65.9 ML
BH CV ECHO MEAS - TR MAX PG: 10.1 MMHG
BH CV ECHO MEAS - TR MAX VEL: 158.8 CM/SEC
BH CV ECHO MEASUREMENTS AVERAGE E/E' RATIO: 7.21
BH CV XLRA - RV BASE: 3 CM
BH CV XLRA - RV LENGTH: 6.2 CM
BH CV XLRA - RV MID: 2.7 CM
BH CV XLRA - TDI S': 14.6 CM/SEC
LEFT ATRIUM VOLUME INDEX: 29.1 ML/M2
SINUS: 3.1 CM
STJ: 3.1 CM

## 2023-09-19 PROCEDURE — 25510000001 PERFLUTREN (DEFINITY) 8.476 MG IN SODIUM CHLORIDE (PF) 0.9 % 10 ML INJECTION

## 2023-09-19 PROCEDURE — 93306 TTE W/DOPPLER COMPLETE: CPT

## 2023-09-19 PROCEDURE — 99214 OFFICE O/P EST MOD 30 MIN: CPT | Performed by: INTERNAL MEDICINE

## 2023-09-19 PROCEDURE — 93356 MYOCRD STRAIN IMG SPCKL TRCK: CPT

## 2023-09-19 RX ADMIN — PERFLUTREN 3 ML: 6.52 INJECTION, SUSPENSION INTRAVENOUS at 12:55

## 2023-10-14 NOTE — PROGRESS NOTES
Date of Office Visit: 2023  Encounter Provider: Bryn Gordon MD  Place of Service: [facility] CARDIOLOGY  Patient Name: Orion Zepeda  :1945    Chief Complaint   Patient presents with    third degree heart block    Cardiomyopathy    ventricular tachycardia    Pacemaker Check   :     HPI: Orion Zepeda is a 78 y.o. male who presents today for third degree heart block, status post pacemaker placement    We attempted upgrade to CRT pacing due to decline in his ejection fraction.      RV lead was successfully removed and Defib lead placed, but struggled with access to CS.  Was done in OR    Placed RV lead, attempted LBBap, but more septal    Despite this, EF improved 20% up to 40% on echo today          Past Medical History:   Diagnosis Date    Arthritis     Complete heart block     Coronary artery disease     Dislocated shoulder, right, subsequent encounter     Emphysema of lung     History of staph infection     AFTER SHOULDER SURGERY IN THE 70'S    Hyperlipidemia     Lung nodule     Nasal septal deviation     Nasal turbinate hypertrophy     GREGORIO (obstructive sleep apnea)     Prediabetes     SOB (shortness of breath) on exertion     Syncope        Past Surgical History:   Procedure Laterality Date    CARDIAC CATHETERIZATION N/A 3/13/2023    Procedure: Left Heart Cath;  Surgeon: Rod Ash MD;  Location: [facility] CATH INVASIVE LOCATION;  Service: Cardiology;  Laterality: N/A;    CARDIAC ELECTROPHYSIOLOGY PROCEDURE N/A 2019    Procedure: Temporary Pacemaker;  Surgeon: Vonnie Jeffries MD;  Location:  [facility] CATH INVASIVE LOCATION;  Service: Cardiology    CARDIAC ELECTROPHYSIOLOGY PROCEDURE N/A 2019    Procedure: Pacemaker DC new  [place];  Surgeon: Bryn Gordon MD;  Location:  [facility] CATH INVASIVE LOCATION;  Service: Cardiovascular    CARDIAC ELECTROPHYSIOLOGY PROCEDURE N/A 2019    Procedure: Temporary Pacemaker- Reposition;   Surgeon: Vonnie Jeffries MD;  Location: [facility] CATH INVASIVE LOCATION;  Service: Cardiology    DIAGNOSTIC LAPAROSCOPY N/A 09/15/2019    Procedure: DIAGNOSTIC LAPAROSCOPY;  Surgeon: Sonya Sheikh MD;  Location: [facility] OR;  Service: General    IMPLANTABLE CARDIOVERTER DEFIBRILLATOR LEAD REPLACEMENT/POCKET REVISION N/A 3/23/2023    Procedure: LASER PPM lead extraction transvenous; AICD LEAD IMPLANT, CRT PLACEMENT, WITH GENERATOR IMPLANT;  Surgeon: Bryn Gordon MD;  Location: [facility] CVOR;  Service: Cardiology;  Laterality: N/A;  removal of current pacemaker generator and leads. reimplant Biventricular ICD-- [place]    INGUINAL HERNIA REPAIR N/A 09/15/2019    Procedure: left INGUINAL HERNIA REPAIR LAPAROSCOPIC, REDUCTION OF SMALL BOWEL;  Surgeon: Sonya Sheikh MD;  Location: [facility] OR;  Service: General    SEPTOPLASTY, RESECTION INFERIOR TURBINATES N/A 2022    Procedure: SEPTOPLASTY, SUBMUCOUS RESECTION INFERIOR TURBINATES, NASAL ENDOSCOPY and ENDOSCOPIC POLYPECTOMY;  Surgeon: Nj Peterson MD;  Location: [facility];  Service: ENT;  Laterality: N/A;    SHOULDER SURGERY Left     in his 20s; ligamentous surgery       Social History     Socioeconomic History    Marital status:     Number of children: 2   Tobacco Use    Smoking status: Former     Packs/day: 1.00     Years: 50.00     Additional pack years: 0.00     Total pack years: 50.00     Types: Cigarettes     Quit date:      Years since quittin.7    Smokeless tobacco: Never   Vaping Use    Vaping Use: Never used   Substance and Sexual Activity    Alcohol use: Never    Drug use: Yes     Frequency: 4.0 times per week     Types: Marijuana    Sexual activity: Defer       Family History   Problem Relation Age of Onset    Stroke Mother     Tuberculosis Mother     Lung cancer Father     Rheumatic fever Brother     Malig Hyperthermia Neg Hx        Review of Systems   Constitutional: Positive for malaise/fatigue.  "  Cardiovascular: Negative.    Respiratory:  Positive for shortness of breath.    Gastrointestinal: Negative.        No Known Allergies      Current Outpatient Medications:     albuterol sulfate  (90 Base) MCG/ACT inhaler, Inhale 2 puffs Every 4 (Four) Hours As Needed for Wheezing or Shortness of Air., Disp: 18 g, Rfl: 1    ASPIRIN 81 PO, Take 1 tablet by mouth Daily., Disp: , Rfl:     atorvastatin (Lipitor) 80 MG tablet, Take 1 tablet by mouth Daily., Disp: 90 tablet, Rfl: 1    carvedilol (COREG) 12.5 MG tablet, Take 1 tablet by mouth 2 (Two) Times a Day With Meals., Disp: , Rfl:     dapagliflozin Propanediol 10 MG tablet, Take 10 mg by mouth Daily., Disp: , Rfl:     ezetimibe (Zetia) 10 MG tablet, Take 1 tablet by mouth Daily., Disp: 90 tablet, Rfl: 1    multivitamin with minerals tablet tablet, Take 1 tablet by mouth Daily., Disp: , Rfl:     Omega-3 Fatty Acids (fish oil) 1000 MG capsule capsule, Take 1 capsule by mouth Daily., Disp: , Rfl:     sacubitril-valsartan (Entresto) 24-26 MG tablet, Take 1 tablet by mouth 2 (Two) Times a Day., Disp: , Rfl:     sodium chloride 0.65 % nasal spray, 1 spray into the nostril(s) as directed by provider As Needed for Congestion., Disp: , Rfl:     spironolactone (ALDACTONE) 25 MG tablet, Take 0.5 tablets by mouth Daily., Disp: , Rfl:     traZODone (DESYREL) 50 MG tablet, Take 1 tablet by mouth Every Night., Disp: , Rfl:       Objective:     Vitals:    09/19/23 1154   BP: 104/72   Pulse: 81   Weight: 108 kg (238 lb)   Height: 180.3 cm (71\")     Body mass index is 33.19 kg/m².    PHYSICAL EXAM:    Vitals and nursing note reviewed.   Constitutional:       General: Not in acute distress.  Pulmonary:      Effort: Pulmonary effort is normal. No respiratory distress.   Chest:      Comments: Pacemaker well healed on left chest  Cardiovascular:      Normal rate. Regular rhythm.   Edema:     Peripheral edema absent.   Skin:     General: Skin is warm and dry.   Neurological:      " Mental Status: Alert and oriented to person, place, and time.   Psychiatric:         Behavior: Behavior normal.         Thought Content: Thought content normal.         Judgment: Judgment normal.             ECG 12 Lead    Date/Time: 10/18/2023 5:26 PM  Performed by: Bryn Gordon MD    Authorized by: Bryn Gordon MD  Comparison: compared with previous ECG from 5/1/2023  Rhythm: sinus rhythm and paced            Assessment:       Diagnosis Plan   1. Ischemic cardiomyopathy        2. Third degree heart block               Plan:       We had considered another attempt at CRT with LV or LBBAP    As he EF has significant improved, recommended we continue current treatment.     As always, it has been a pleasure to participate in your patient's care.      Sincerely,         Bryn Gordon MD

## 2023-10-18 PROCEDURE — 93000 ELECTROCARDIOGRAM COMPLETE: CPT | Performed by: INTERNAL MEDICINE

## 2023-11-13 ENCOUNTER — OFFICE VISIT (OUTPATIENT)
Dept: INTERNAL MEDICINE | Facility: CLINIC | Age: 78
End: 2023-11-13
Payer: MEDICARE

## 2023-11-13 VITALS
HEART RATE: 88 BPM | BODY MASS INDEX: 34.16 KG/M2 | HEIGHT: 71 IN | OXYGEN SATURATION: 95 % | SYSTOLIC BLOOD PRESSURE: 100 MMHG | WEIGHT: 244 LBS | DIASTOLIC BLOOD PRESSURE: 60 MMHG

## 2023-11-13 DIAGNOSIS — I50.20 HFREF (HEART FAILURE WITH REDUCED EJECTION FRACTION): ICD-10-CM

## 2023-11-13 DIAGNOSIS — Z23 NEED FOR IMMUNIZATION AGAINST INFLUENZA: ICD-10-CM

## 2023-11-13 DIAGNOSIS — I48.91 ATRIAL FIBRILLATION, UNSPECIFIED TYPE: ICD-10-CM

## 2023-11-13 DIAGNOSIS — I25.10 CORONARY ARTERY DISEASE INVOLVING NATIVE CORONARY ARTERY OF NATIVE HEART WITHOUT ANGINA PECTORIS: Primary | ICD-10-CM

## 2023-11-13 DIAGNOSIS — I44.2 COMPLETE HEART BLOCK: ICD-10-CM

## 2023-11-13 DIAGNOSIS — Z23 NEED FOR COVID-19 VACCINE: ICD-10-CM

## 2023-11-13 DIAGNOSIS — E78.2 MIXED HYPERLIPIDEMIA: ICD-10-CM

## 2023-11-13 LAB
CHOLEST SERPL-MCNC: 126 MG/DL (ref 0–200)
HDLC SERPL-MCNC: 44 MG/DL (ref 40–60)
LDLC SERPL CALC-MCNC: 59 MG/DL (ref 0–100)
TRIGL SERPL-MCNC: 131 MG/DL (ref 0–150)
TSH SERPL DL<=0.005 MIU/L-ACNC: 0.52 UIU/ML (ref 0.27–4.2)
VLDLC SERPL CALC-MCNC: 23 MG/DL (ref 5–40)

## 2023-11-13 PROCEDURE — 99214 OFFICE O/P EST MOD 30 MIN: CPT | Performed by: STUDENT IN AN ORGANIZED HEALTH CARE EDUCATION/TRAINING PROGRAM

## 2023-11-13 RX ORDER — EZETIMIBE 10 MG/1
10 TABLET ORAL DAILY
Qty: 90 TABLET | Refills: 1 | Status: SHIPPED | OUTPATIENT
Start: 2023-11-13

## 2023-11-13 RX ORDER — CARVEDILOL 6.25 MG/1
6.25 TABLET ORAL 2 TIMES DAILY WITH MEALS
COMMUNITY
Start: 2023-09-27

## 2023-11-13 RX ORDER — ATORVASTATIN CALCIUM 80 MG/1
80 TABLET, FILM COATED ORAL DAILY
Qty: 90 TABLET | Refills: 1 | Status: SHIPPED | OUTPATIENT
Start: 2023-11-13

## 2023-11-13 NOTE — PROGRESS NOTES
Bradley Retana D.O.  Internal Medicine  [facility]  [address], [address]  [place] [address]  [phone]      Chief Complaint  Hyperlipidemia    SUBJECTIVE    History of Present Illness    Orion Zepeda is a 78 y.o. male who presents to the office today as an established patient that last saw me on 8/11/2023.     Atrial fibrillation. HFrEF, complete heart block, CAD/HLD : has follolwed with [facility] and now with [facility]. Taking atoravastatin 80 mg daily for lipid management and at last visit ezetimibe 10 mg daily was added. For HFrEF, he is taking carvedilol 6.25 mg twice daily, Entresto 24-26 mg twice daily, spironolactone 12.5 mg daily, dapagliflpozin 10 mg daily. On 3/23 he underwent RV lead and generator extraction and upgrade to CRT-D. Recently completed cardiac rehab at [facility].Anticoagulated with eliquis 5 mg twice daily. States he deals with no swelling in the legs. Home BP log is below:            No Known Allergies     Outpatient Medications Marked as Taking for the 11/13/23 encounter (Office Visit) with Bradley Retana, DO   Medication Sig Dispense Refill    apixaban (ELIQUIS) 5 MG tablet tablet Take 1 tablet by mouth 2 (Two) Times a Day.      atorvastatin (Lipitor) 80 MG tablet Take 1 tablet by mouth Daily. 90 tablet 1    carvedilol (COREG) 6.25 MG tablet Take 1 tablet by mouth 2 (Two) Times a Day With Meals.      dapagliflozin Propanediol 10 MG tablet Take 10 mg by mouth Daily.      ezetimibe (Zetia) 10 MG tablet Take 1 tablet by mouth Daily. 90 tablet 1    multivitamin with minerals tablet tablet Take 1 tablet by mouth Daily.      Omega-3 Fatty Acids (fish oil) 1000 MG capsule capsule Take 1 capsule by mouth Daily.      sacubitril-valsartan (Entresto) 24-26 MG tablet Take 1 tablet by mouth 2 (Two) Times a Day.      sodium chloride 0.65 % nasal spray 1 spray into the nostril(s) as directed by provider As Needed for Congestion.      spironolactone  "(ALDACTONE) 25 MG tablet Take 0.5 tablets by mouth Daily.      traZODone (DESYREL) 50 MG tablet Take 1 tablet by mouth Every Night.      [DISCONTINUED] atorvastatin (Lipitor) 80 MG tablet Take 1 tablet by mouth Daily. 90 tablet 1    [DISCONTINUED] ezetimibe (Zetia) 10 MG tablet Take 1 tablet by mouth Daily. 90 tablet 1        Past Medical History:   Diagnosis Date    Arthritis     Atrial fibrillation     Complete heart block 2019    Coronary artery disease     Dislocated shoulder, right, subsequent encounter 2011    Emphysema of lung     History of staph infection     AFTER SHOULDER SURGERY IN THE 70'S    Hyperlipidemia     Lung nodule     Nasal septal deviation     Nasal turbinate hypertrophy     GREGORIO (obstructive sleep apnea)     Prediabetes     SOB (shortness of breath) on exertion     Syncope 2018       OBJECTIVE    Vital Signs:   /60   Pulse 88   Ht 180 cm (70.87\")   Wt 111 kg (244 lb)   SpO2 95%   BMI 34.16 kg/m²     Physical Exam  Vitals reviewed.   Constitutional:       General: He is not in acute distress.     Appearance: Normal appearance. He is obese. He is not ill-appearing.   HENT:      Head: Atraumatic.   Eyes:      General: No scleral icterus.  Cardiovascular:      Rate and Rhythm: Normal rate and regular rhythm.      Heart sounds: Normal heart sounds. No murmur heard.  Pulmonary:      Effort: Pulmonary effort is normal. No respiratory distress.      Breath sounds: Normal breath sounds. No stridor. No wheezing or rhonchi.   Musculoskeletal:      Right lower leg: No edema.      Left lower leg: No edema.   Skin:     Coloration: Skin is not jaundiced.   Neurological:      Mental Status: He is alert.   Psychiatric:         Mood and Affect: Mood normal.         Behavior: Behavior normal.         Thought Content: Thought content normal.                             ASSESSMENT & PLAN     Diagnoses and all orders for this visit:    1. Coronary artery disease involving native coronary artery of native " heart without angina pectoris (Primary)  2. Mixed hyperlipidemia  3. HFrEF (heart failure with reduced ejection fraction)  4. Atrial fibrillation, unspecified type  5. Complete heart block  -has follolwed with [facility] and now with [facility]. Taking atoravastatin 80 mg daily for lipid management and at last visit ezetimibe 10 mg daily was added. For HFrEF, he is taking carvedilol 6.25 mg twice daily, Entresto 24-26 mg twice daily, spironolactone 12.5 mg daily, dapagliflpozin 10 mg daily. On 3/23 he underwent RV lead and generator extraction and upgrade to CRT-D. Recently completed cardiac rehab at [facility].Anticoagulated with eliquis 5 mg twice daily. States he deals with no swelling in the legs. Home BP log reviewed above and is appropriate.  -I reviewed two most recent September [facility] notes  -will check TSH , last checked in 2022 and was low normal   Lab Results   Component Value Date    CHLPL 164 08/11/2023    TRIG 121 08/11/2023    HDL 46 08/11/2023    LDL 96 08/11/2023     -goal LDL less tahn 70 , trig less than 55  -recheck fasting lipid today, adjust regimen as needed. Likely add repatha next if needed.  -/60 today. Euvolemic, HR 88, 95% on room air.   -otherwise continue current regimen as above         The following social determinates of health impact the patient's medical decision making: No social determinates of health were factored in to today's visit.     Follow Up  Return in about 3 months (around 2/13/2024) for Recheck.    Patient/family had no further questions at this time and verbalized understanding of the plan discussed today.

## 2024-02-15 ENCOUNTER — OFFICE VISIT (OUTPATIENT)
Dept: INTERNAL MEDICINE | Facility: CLINIC | Age: 79
End: 2024-02-15
Payer: MEDICARE

## 2024-02-15 VITALS
DIASTOLIC BLOOD PRESSURE: 82 MMHG | HEIGHT: 71 IN | SYSTOLIC BLOOD PRESSURE: 124 MMHG | BODY MASS INDEX: 33.46 KG/M2 | HEART RATE: 91 BPM | OXYGEN SATURATION: 97 % | WEIGHT: 239 LBS

## 2024-02-15 DIAGNOSIS — R73.03 PREDIABETES: Primary | ICD-10-CM

## 2024-02-15 DIAGNOSIS — I50.20 HFREF (HEART FAILURE WITH REDUCED EJECTION FRACTION): ICD-10-CM

## 2024-02-15 DIAGNOSIS — I44.2 COMPLETE HEART BLOCK: ICD-10-CM

## 2024-02-15 DIAGNOSIS — I25.10 CORONARY ARTERY DISEASE INVOLVING NATIVE CORONARY ARTERY OF NATIVE HEART WITHOUT ANGINA PECTORIS: ICD-10-CM

## 2024-02-15 RX ORDER — CHLORAL HYDRATE 500 MG
1000 CAPSULE ORAL DAILY
COMMUNITY

## 2024-02-15 RX ORDER — CARVEDILOL 12.5 MG/1
12.5 TABLET ORAL 2 TIMES DAILY WITH MEALS
COMMUNITY
Start: 2024-01-24

## 2024-02-15 RX ORDER — EZETIMIBE 10 MG/1
10 TABLET ORAL DAILY
Qty: 90 TABLET | Refills: 1 | Status: SHIPPED | OUTPATIENT
Start: 2024-02-15

## 2024-02-16 LAB — HBA1C MFR BLD: 5.7 % (ref 4.8–5.6)

## 2024-05-20 ENCOUNTER — OFFICE VISIT (OUTPATIENT)
Dept: INTERNAL MEDICINE | Facility: CLINIC | Age: 79
End: 2024-05-20
Payer: MEDICARE

## 2024-05-20 VITALS
HEIGHT: 71 IN | OXYGEN SATURATION: 95 % | SYSTOLIC BLOOD PRESSURE: 100 MMHG | HEART RATE: 86 BPM | WEIGHT: 240 LBS | DIASTOLIC BLOOD PRESSURE: 60 MMHG | BODY MASS INDEX: 33.6 KG/M2

## 2024-05-20 DIAGNOSIS — I71.40 ABDOMINAL AORTIC ANEURYSM (AAA) WITHOUT RUPTURE, UNSPECIFIED PART: ICD-10-CM

## 2024-05-20 DIAGNOSIS — R91.1 LUNG NODULE: ICD-10-CM

## 2024-05-20 DIAGNOSIS — H61.21 IMPACTED CERUMEN OF RIGHT EAR: ICD-10-CM

## 2024-05-20 DIAGNOSIS — Z00.00 MEDICARE ANNUAL WELLNESS VISIT, SUBSEQUENT: Primary | ICD-10-CM

## 2024-05-20 DIAGNOSIS — Z00.00 ANNUAL PHYSICAL EXAM: ICD-10-CM

## 2024-05-20 DIAGNOSIS — Z23 NEED FOR COVID-19 VACCINE: ICD-10-CM

## 2024-05-20 PROCEDURE — G0439 PPPS, SUBSEQ VISIT: HCPCS | Performed by: STUDENT IN AN ORGANIZED HEALTH CARE EDUCATION/TRAINING PROGRAM

## 2024-05-20 PROCEDURE — 1170F FXNL STATUS ASSESSED: CPT | Performed by: STUDENT IN AN ORGANIZED HEALTH CARE EDUCATION/TRAINING PROGRAM

## 2024-05-20 PROCEDURE — 1126F AMNT PAIN NOTED NONE PRSNT: CPT | Performed by: STUDENT IN AN ORGANIZED HEALTH CARE EDUCATION/TRAINING PROGRAM

## 2024-05-20 PROCEDURE — 99213 OFFICE O/P EST LOW 20 MIN: CPT | Performed by: STUDENT IN AN ORGANIZED HEALTH CARE EDUCATION/TRAINING PROGRAM

## 2024-05-20 PROCEDURE — 91320 SARSCV2 VAC 30MCG TRS-SUC IM: CPT | Performed by: STUDENT IN AN ORGANIZED HEALTH CARE EDUCATION/TRAINING PROGRAM

## 2024-05-20 PROCEDURE — 99397 PER PM REEVAL EST PAT 65+ YR: CPT | Performed by: STUDENT IN AN ORGANIZED HEALTH CARE EDUCATION/TRAINING PROGRAM

## 2024-05-20 PROCEDURE — 90480 ADMN SARSCOV2 VAC 1/ONLY CMP: CPT | Performed by: STUDENT IN AN ORGANIZED HEALTH CARE EDUCATION/TRAINING PROGRAM

## 2024-05-20 RX ORDER — METOPROLOL SUCCINATE 25 MG/1
25 TABLET, EXTENDED RELEASE ORAL DAILY
COMMUNITY
Start: 2024-05-15

## 2024-05-20 NOTE — PROGRESS NOTES
"The ABCs of the Annual Wellness Visit  Subsequent Medicare Wellness Visit    Subjective      Orion Zepeda is a 78 y.o. male who presents for a Subsequent Medicare Wellness Visit.    The following portions of the patient's history were reviewed and   updated as appropriate: allergies, current medications, past family history, past medical history, past social history, past surgical history, and problem list.    Chronic sinusitis: follows with ENT, s/p septoplasty and resection of inferior nasal turbinates.     Prediabetes: states he eats a piece of bread every day, drinks full sugar soft drinks.   Lab Results   Component Value Date    HGBA1C 5.70 (H) 02/15/2024        Atrial fibrillation. HFrEF, complete heart block, CAD/HLD : has follolwed with [facility] and now with [facility]. Taking atoravastatin 80 mg daily for lipid management and ezetimibe 10 mg, aspirin 81 mg daily . For HFrEF, he is taking metoprolol succinate 25 mg daily,  Entresto 24-26 1.5 tabs twice daily, spironolactone 12.5 mg daily, dapagliflpozin 10 mg daily. On 3/23 he underwent RV lead and generator extraction and upgrade to CRT-D. Recently completed cardiac rehab at [facility]. No longer needing anticoagulation due to no atrial fib burden on monitoring . Home BP 110s-120s/60s-70s on average.  Lab Results   Component Value Date    CHLPL 126 11/13/2023    TRIG 131 11/13/2023    HDL 44 11/13/2023    LDL 59 11/13/2023       Axonal peripheral neuropathy : has seen [facility]. He is in physical therapy for balance. States he feels stronger but balance still not there.     GREGORIO: doesn't wear CPAP     aneurysmal dilatation of the abdominal aorta measuring 3.7 x 3.6 cm seen on 2020 CT scan    Compared to one year ago, the patient feels his physical   health is the same.    Compared to one year ago, the patient feels his mental   health is the same. \"Fine\"    Recent Hospitalizations:  He was not admitted to the hospital during the last " year.       Current Medical Providers:  Patient Care Team:  Bradley Retana DO as PCP - General (Internal Medicine)  Bryn Gordon MD as Consulting Physician (Cardiology)    Outpatient Medications Prior to Visit   Medication Sig Dispense Refill    ASPIRIN 81 PO Take 81 mg by mouth Daily.      atorvastatin (Lipitor) 80 MG tablet Take 1 tablet by mouth Daily. 90 tablet 1    dapagliflozin Propanediol 10 MG tablet Take 10 mg by mouth Daily.      ezetimibe (Zetia) 10 MG tablet Take 1 tablet by mouth Daily. 90 tablet 1    metoprolol succinate XL (TOPROL-XL) 25 MG 24 hr tablet Take 1 tablet by mouth Daily.      multivitamin with minerals tablet tablet Take 1 tablet by mouth Daily.      Omega-3 Fatty Acids (fish oil) 1000 MG capsule capsule Take 1 capsule by mouth Daily.      sacubitril-valsartan (Entresto) 24-26 MG tablet Take 1.5 tablets by mouth 2 (Two) Times a Day.      sodium chloride 0.65 % nasal spray 1 spray into the nostril(s) as directed by provider As Needed for Congestion.      spironolactone (ALDACTONE) 25 MG tablet Take 0.5 tablets by mouth Daily.      carvedilol (COREG) 12.5 MG tablet Take 1 tablet by mouth 2 (Two) Times a Day With Meals.      albuterol sulfate  (90 Base) MCG/ACT inhaler Inhale 2 puffs Every 4 (Four) Hours As Needed for Wheezing or Shortness of Air. (Patient not taking: Reported on 5/20/2024) 18 g 1    Omega-3 Fatty Acids (fish oil) 1000 MG capsule capsule Take 1 capsule by mouth Daily.      traZODone (DESYREL) 50 MG tablet Take 1 tablet by mouth Every Night.       No facility-administered medications prior to visit.       No opioid medication identified on active medication list. I have reviewed chart for other potential  high risk medication/s and harmful drug interactions in the elderly.        Aspirin is on active medication list. Aspirin use is indicated based on review of current medical condition/s. Pros and cons of this therapy have been discussed today. Benefits of this  "medication outweigh potential harm.  Patient has been encouraged to continue taking this medication.  .      Patient Active Problem List   Diagnosis    Third degree heart block    Syncope    Shortness of breath    Ventricular tachyarrhythmia    Cardiomyopathy    Ischemic cardiomyopathy     Advance Care Planning   Advance Care Planning     Advance Directive is not on file.  ACP discussion was held with the patient during this visit. Patient has an advance directive (not in EMR), copy requested.     Objective    Vitals:    05/20/24 0905   BP: 100/60   Pulse: 86   SpO2: 95%   Weight: 109 kg (240 lb)   Height: 180 cm (70.87\")     Physical Exam  Vitals reviewed.   Constitutional:       General: He is not in acute distress.     Appearance: Normal appearance. He is obese. He is not ill-appearing.   HENT:      Head: Normocephalic and atraumatic.      Right Ear: External ear normal. There is impacted cerumen.      Left Ear: Tympanic membrane, ear canal and external ear normal. There is no impacted cerumen.      Mouth/Throat:      Mouth: Mucous membranes are moist.      Pharynx: No oropharyngeal exudate or posterior oropharyngeal erythema.   Eyes:      General: No scleral icterus.     Extraocular Movements: Extraocular movements intact.      Conjunctiva/sclera: Conjunctivae normal.      Pupils: Pupils are equal, round, and reactive to light.   Cardiovascular:      Rate and Rhythm: Normal rate and regular rhythm.      Heart sounds: Normal heart sounds. No murmur heard.  Pulmonary:      Effort: Pulmonary effort is normal. No respiratory distress.      Breath sounds: Normal breath sounds. No wheezing.   Abdominal:      General: Bowel sounds are normal. There is no distension.      Palpations: Abdomen is soft.      Tenderness: There is no abdominal tenderness. There is no guarding.   Musculoskeletal:      Cervical back: Neck supple.      Right lower leg: No edema.      Left lower leg: No edema.      Comments: Ambulating with cane " "  Lymphadenopathy:      Cervical: No cervical adenopathy.   Skin:     General: Skin is warm and dry.      Coloration: Skin is not jaundiced.   Neurological:      General: No focal deficit present.      Mental Status: He is alert and oriented to person, place, and time.      Cranial Nerves: No cranial nerve deficit.      Motor: No weakness.   Psychiatric:         Mood and Affect: Mood normal.         Behavior: Behavior normal.         Thought Content: Thought content normal.           Estimated body mass index is 33.6 kg/m² as calculated from the following:    Height as of this encounter: 180 cm (70.87\").    Weight as of this encounter: 109 kg (240 lb).           Does the patient have evidence of cognitive impairment?   No            HEALTH RISK ASSESSMENT    Smoking Status:  Social History     Tobacco Use   Smoking Status Former    Current packs/day: 0.00    Average packs/day: 1 pack/day for 50.0 years (50.0 ttl pk-yrs)    Types: Cigarettes    Start date:     Quit date: 2019    Years since quittin.3   Smokeless Tobacco Never     Alcohol Consumption:  Social History     Substance and Sexual Activity   Alcohol Use Never     Fall Risk Screen:    STEADI Fall Risk Assessment was completed, and patient is at LOW risk for falls.Assessment completed on:2024    Depression Screenin/20/2024     9:12 AM   PHQ-2/PHQ-9 Depression Screening   Little Interest or Pleasure in Doing Things 0-->not at all   Feeling Down, Depressed or Hopeless 0-->not at all   PHQ-9: Brief Depression Severity Measure Score 0       Health Habits and Functional and Cognitive Screenin/20/2024     9:10 AM   Functional & Cognitive Status   Do you have difficulty preparing food and eating? No   Do you have difficulty bathing yourself, getting dressed or grooming yourself? No   Do you have difficulty using the toilet? No   Do you have difficulty moving around from place to place? Yes   Do you have trouble with steps or getting " out of a bed or a chair? Yes   Current Diet Well Balanced Diet   Dental Exam Not up to date   Eye Exam Up to date   Exercise (times per week) 1 times per week   Do you need help using the phone?  No   Are you deaf or do you have serious difficulty hearing?  No   Do you need help to go to places out of walking distance? No   Do you need help shopping? No   Do you need help preparing meals?  No   Do you need help with housework?  No   Do you need help with laundry? No   Do you need help taking your medications? No   Do you need help managing money? No   Do you ever drive or ride in a car without wearing a seat belt? No   Have you felt unusual stress, anger or loneliness in the last month? No   Who do you live with? Alone   If you need help, do you have trouble finding someone available to you? No   Do you have difficulty concentrating, remembering or making decisions? No       Age-appropriate Screening Schedule:  Refer to the list below for future screening recommendations based on patient's age, sex and/or medical conditions. Orders for these recommended tests are listed in the plan section. The patient has been provided with a written plan.    Health Maintenance   Topic Date Due    RSV Vaccine - Adults (1 - 1-dose 60+ series) Never done    COVID-19 Vaccine (7 - 2023-24 season) 03/13/2024    LUNG CANCER SCREENING  07/14/2024    INFLUENZA VACCINE  08/01/2024    BMI FOLLOWUP  08/11/2024    LIPID PANEL  11/13/2024    COLORECTAL CANCER SCREENING  05/18/2025    ANNUAL WELLNESS VISIT  05/20/2025    TDAP/TD VACCINES (2 - Td or Tdap) 05/24/2032    HEPATITIS C SCREENING  Completed    Pneumococcal Vaccine 65+  Completed    ZOSTER VACCINE  Completed                  CMS Preventative Services Quick Reference  Risk Factors Identified During Encounter:    Immunizations Discussed/Encouraged: COVID19 and RSV (Respiratory Syncytial Virus)  Polypharmacy: Medication List reviewed  Dental Screening Recommended  Vision Screening  Recommended    The above risks/problems have been discussed with the patient.  Pertinent information has been shared with the patient in the After Visit Summary.      Follow Up:   Next Medicare Wellness visit to be scheduled in 1 year.      An After Visit Summary and PPPS were made available to the patient.    Patient's annual Complete Physical Exam was also completed on this date:   -Updated and reviewed past medical, family, social and surgical histories as well as allergies. Addressed care gaps listed in the medical record. Reviewed and updated medication list.   -Encouraged minimum of 30 minutes or more of exercise at a brisk walk or higher 5 days per week.  -Immunizations reviewed and updated in EMR.  -Physical exam findings documented above  Other Pertinent Preventative Topics Reviewed:   -Lipid screening:  Patient is already on statin therapy.   -Aspirin for primary or secondary prevention: pt is already on aspirin therapy  -Diabetes screening:  Patient has a diagnosis of pre-diabetes and is being monitored with yearly hemoglobin A1c.   -Abdominal aortic aneurysm screening: known AAA, see plan below  -Hypertension screening: Patient with known diagnosis of hypertension and is receiving treatment.  -HIV screening: Patient is over age 65, screening not indicated.   -Syphilis screening: Syphilis screening not indicated.  -Hepatitis B virus screening: Screening not indicated, not in a high-risk group.  -Hepatitis C virus screening:  Patient has already completed Hepatitis C screening. Negative screening on file.   -Colon cancer screening: aged out of screening   -Lung cancer screening: known lung nodule, see plan below  -Prostate cancer screening: aged outaged out of screening       A problem-based visit was also conducted on the same day, see below for assessment and plan    Diagnoses and all orders for this visit:    1. Lung nodule (Primary)  -update annual CT chest for continued monitoring   -     CT Chest  Without Contrast; Future    2. Abdominal aortic aneurysm (AAA) without rupture, unspecified part  -measuring 3.7 x 3.6 cm seen on 2020 CT scan  -update abdominal U/S for continued monitoring  -he has stopped smoking, lipids and BP well controlled   -     US abdomen limited; Future    3. Impacted cerumen of right ear  -begin ear wax softening drop OTC            The following social determinates of health impact the patient's medical decision making: No social determinates of health were factored in to today's visit.     Follow Up  Return in about 6 months (around 11/20/2024) for Recheck.

## 2024-07-03 ENCOUNTER — HOSPITAL ENCOUNTER (OUTPATIENT)
Dept: CT IMAGING | Facility: HOSPITAL | Age: 79
Discharge: HOME | End: 2024-07-03
Payer: MEDICARE

## 2024-07-03 ENCOUNTER — HOSPITAL ENCOUNTER (OUTPATIENT)
Dept: ULTRASOUND IMAGING | Facility: HOSPITAL | Age: 79
Discharge: HOME | End: 2024-07-03
Payer: MEDICARE

## 2024-07-03 DIAGNOSIS — I71.40 ABDOMINAL AORTIC ANEURYSM (AAA) WITHOUT RUPTURE, UNSPECIFIED PART: ICD-10-CM

## 2024-07-03 DIAGNOSIS — R91.1 LUNG NODULE: ICD-10-CM

## 2024-07-03 DIAGNOSIS — I71.40 ABDOMINAL AORTIC ANEURYSM (AAA) WITHOUT RUPTURE, UNSPECIFIED PART: Primary | ICD-10-CM

## 2024-07-03 PROCEDURE — 71250 CT THORAX DX C-: CPT

## 2024-07-03 PROCEDURE — 76775 US EXAM ABDO BACK WALL LIM: CPT

## 2024-07-09 DIAGNOSIS — R91.1 LUNG NODULE: Primary | ICD-10-CM

## 2024-08-11 DIAGNOSIS — I25.10 CORONARY ARTERY DISEASE INVOLVING NATIVE CORONARY ARTERY OF NATIVE HEART WITHOUT ANGINA PECTORIS: ICD-10-CM

## 2024-08-11 DIAGNOSIS — E78.2 MIXED HYPERLIPIDEMIA: ICD-10-CM

## 2024-08-13 RX ORDER — ATORVASTATIN CALCIUM 80 MG/1
80 TABLET, FILM COATED ORAL DAILY
Qty: 90 TABLET | Refills: 2 | Status: SHIPPED | OUTPATIENT
Start: 2024-08-13

## 2024-11-12 DIAGNOSIS — I25.10 CORONARY ARTERY DISEASE INVOLVING NATIVE CORONARY ARTERY OF NATIVE HEART WITHOUT ANGINA PECTORIS: ICD-10-CM

## 2024-11-12 RX ORDER — EZETIMIBE 10 MG/1
10 TABLET ORAL DAILY
Qty: 90 TABLET | Refills: 0 | Status: SHIPPED | OUTPATIENT
Start: 2024-11-12

## 2024-11-20 ENCOUNTER — OFFICE VISIT (OUTPATIENT)
Dept: INTERNAL MEDICINE | Facility: CLINIC | Age: 79
End: 2024-11-20
Payer: MEDICARE

## 2024-11-20 VITALS
BODY MASS INDEX: 32.76 KG/M2 | OXYGEN SATURATION: 94 % | DIASTOLIC BLOOD PRESSURE: 70 MMHG | HEART RATE: 105 BPM | WEIGHT: 234 LBS | TEMPERATURE: 97.7 F | SYSTOLIC BLOOD PRESSURE: 118 MMHG | HEIGHT: 71 IN

## 2024-11-20 DIAGNOSIS — E78.2 MIXED HYPERLIPIDEMIA: ICD-10-CM

## 2024-11-20 DIAGNOSIS — H81.13 BENIGN PAROXYSMAL POSITIONAL VERTIGO DUE TO BILATERAL VESTIBULAR DISORDER: ICD-10-CM

## 2024-11-20 DIAGNOSIS — I25.10 CORONARY ARTERY DISEASE INVOLVING NATIVE CORONARY ARTERY OF NATIVE HEART WITHOUT ANGINA PECTORIS: Primary | ICD-10-CM

## 2024-11-20 DIAGNOSIS — I50.20 HFREF (HEART FAILURE WITH REDUCED EJECTION FRACTION): ICD-10-CM

## 2024-11-20 DIAGNOSIS — I44.2 COMPLETE HEART BLOCK: ICD-10-CM

## 2024-11-20 PROCEDURE — 99214 OFFICE O/P EST MOD 30 MIN: CPT | Performed by: STUDENT IN AN ORGANIZED HEALTH CARE EDUCATION/TRAINING PROGRAM

## 2024-11-20 PROCEDURE — G2211 COMPLEX E/M VISIT ADD ON: HCPCS | Performed by: STUDENT IN AN ORGANIZED HEALTH CARE EDUCATION/TRAINING PROGRAM

## 2024-11-20 PROCEDURE — 1126F AMNT PAIN NOTED NONE PRSNT: CPT | Performed by: STUDENT IN AN ORGANIZED HEALTH CARE EDUCATION/TRAINING PROGRAM

## 2024-11-20 NOTE — PROGRESS NOTES
Chief Complaint  6 mos check-up heart    SUBJECTIVE    History of Present Illness    Orion Zepeda is a 79 y.o. male who presents to the office today as an established patient that last saw me on 5/20/2024.     Atrial fibrillation. HFrEF, complete heart block, CAD/HLD : has follolwed with [facility] [place], operated by [facility] and now with [facility]. Taking atoravastatin 80 mg daily for lipid management and ezetimibe 10 mg, aspirin 81 mg daily . For HFrEF, he is taking metoprolol succinate 12.5 mg daily,  Entresto 24-26 1 tabs twice daily, spironolactone 12.5 mg daily, dapagliflpozin 10 mg daily. On 3/23 he underwent RV lead and generator extraction and upgrade to CRT-D.  No longer needing anticoagulation due to no atrial fib burden on monitoring .States he had a Cardiac Amyloidosis SPECT Scan yesterday.  states overall he feels pretty good. Recent home BP log below:          States he continues to deal with dizziness when turning his head certain directions. Seemingly to get worse. Denies sensation of feeling like he is going to pass out. States it feels like he is off balance.     No Known Allergies     Outpatient Medications Marked as Taking for the 11/20/24 encounter (Office Visit) with Bradley Retana, DO   Medication Sig Dispense Refill    ASPIRIN 81 PO Take 81 mg by mouth Daily.      atorvastatin (LIPITOR) 80 MG tablet Take 1 tablet by mouth once daily 90 tablet 2    dapagliflozin Propanediol 10 MG tablet Take 10 mg by mouth Daily.      ezetimibe (ZETIA) 10 MG tablet Take 1 tablet by mouth once daily 90 tablet 0    metoprolol succinate XL (TOPROL-XL) 25 MG 24 hr tablet Take 0.5 tablets by mouth Daily.      multivitamin with minerals tablet tablet Take 1 tablet by mouth Daily.      Omega-3 Fatty Acids (fish oil) 1000 MG capsule capsule Take 1 capsule by mouth Daily.      sacubitril-valsartan (Entresto) 24-26 MG tablet Take 1.5 tablets by mouth 2 (Two) Times a Day.      sodium chloride 0.65 % nasal spray Administer 1  spray into the nostril(s) as directed by provider As Needed for Congestion.      spironolactone (ALDACTONE) 25 MG tablet Take 0.5 tablets by mouth Daily.          Past Medical History:   Diagnosis Date    Arthritis     Atrial fibrillation     Complete heart block 2019    Coronary artery disease     Dislocated shoulder, right, subsequent encounter 2011    Emphysema of lung     HFrEF (heart failure with reduced ejection fraction)     History of staph infection     AFTER SHOULDER SURGERY IN THE 70'S    Hyperlipidemia     Lung nodule     Nasal septal deviation     Nasal turbinate hypertrophy     GREGORIO (obstructive sleep apnea)     Polyneuropathy     EMG 2024    Prediabetes     SOB (shortness of breath) on exertion     Syncope 2018     Past Surgical History:   Procedure Laterality Date    CARDIAC CATHETERIZATION N/A 3/13/2023    Procedure: Left Heart Cath;  Surgeon: Rod Ash MD;  Location: [facility] CATH INVASIVE LOCATION;  Service: Cardiology;  Laterality: N/A;    CARDIAC ELECTROPHYSIOLOGY PROCEDURE N/A 05/05/2019    Procedure: Temporary Pacemaker;  Surgeon: Vonnie Jeffries MD;  Location: [facility] CATH INVASIVE LOCATION;  Service: Cardiology    CARDIAC ELECTROPHYSIOLOGY PROCEDURE N/A 05/06/2019    Procedure: Pacemaker DC new  [place];  Surgeon: Bryn Gordon MD;  Location: [facility] CATH INVASIVE LOCATION;  Service: Cardiovascular    CARDIAC ELECTROPHYSIOLOGY PROCEDURE N/A 05/05/2019    Procedure: Temporary Pacemaker- Reposition;  Surgeon: Vonnie Jeffries MD;  Location: [facility] CATH INVASIVE LOCATION;  Service: Cardiology    DIAGNOSTIC LAPAROSCOPY N/A 09/15/2019    Procedure: DIAGNOSTIC LAPAROSCOPY;  Surgeon: Sonya Sheikh MD;  Location: [facility] MAIN OR;  Service: General    IMPLANTABLE CARDIOVERTER DEFIBRILLATOR LEAD REPLACEMENT/POCKET REVISION N/A 3/23/2023    Procedure: LASER PPM lead extraction transvenous; AICD LEAD IMPLANT, CRT PLACEMENT, WITH GENERATOR IMPLANT;  Surgeon: Bryn Gordon MD;   "Location: [facility];  Service: Cardiology;  Laterality: N/A;  removal of current pacemaker generator and leads. reimplant Biventricular ICD-- [place]    INGUINAL HERNIA REPAIR N/A 09/15/2019    Procedure: left INGUINAL HERNIA REPAIR LAPAROSCOPIC, REDUCTION OF SMALL BOWEL;  Surgeon: Sonya Sheikh MD;  Location: [facility] OR;  Service: General    SEPTOPLASTY, RESECTION INFERIOR TURBINATES N/A 6/21/2022    Procedure: SEPTOPLASTY, SUBMUCOUS RESECTION INFERIOR TURBINATES, NASAL ENDOSCOPY and ENDOSCOPIC POLYPECTOMY;  Surgeon: Nj Peterson MD;  Location: [facility];  Service: ENT;  Laterality: N/A;    SHOULDER SURGERY Left     in his 20s; ligamentous surgery     Family History   Problem Relation Age of Onset    Stroke Mother     Tuberculosis Mother     Lung cancer Father     Rheumatic fever Brother     Maninder Hyperthermia Neg Hx     reports that he quit smoking about 5 years ago. His smoking use included cigarettes. He started smoking about 55 years ago. He has a 50 pack-year smoking history. He has never used smokeless tobacco. He reports current drug use. Frequency: 4.00 times per week. Drug: Marijuana. He reports that he does not drink alcohol.    OBJECTIVE    Vital Signs:   /70   Pulse 105   Temp 97.7 °F (36.5 °C) (Infrared)   Ht 180 cm (70.87\")   Wt 106 kg (234 lb)   SpO2 94%   BMI 32.76 kg/m²        Physical Exam  Vitals reviewed.   Constitutional:       General: He is not in acute distress.     Appearance: He is obese. He is not ill-appearing.   HENT:      Ears:      Comments: + kady hallpike b/l with reproduction of symptoms, no nystagmus  Eyes:      General: No scleral icterus.  Cardiovascular:      Rate and Rhythm: Normal rate and regular rhythm.      Heart sounds: Normal heart sounds. No murmur heard.  Pulmonary:      Effort: Pulmonary effort is normal. No respiratory distress.      Breath sounds: Normal breath sounds. No wheezing.   Musculoskeletal:      Right lower leg: No edema.      " Left lower leg: No edema.      Comments: Ambulating with cane   Skin:     Coloration: Skin is not jaundiced.   Neurological:      Mental Status: He is alert.   Psychiatric:         Mood and Affect: Mood normal.         Behavior: Behavior normal.         Thought Content: Thought content normal.                             ASSESSMENT & PLAN     Diagnoses and all orders for this visit:    1. Coronary artery disease involving native coronary artery of native heart without angina pectoris (Primary)  2. Mixed hyperlipidemia  3. HFrEF (heart failure with reduced ejection fraction)  4. Complete heart block  - has follolwed with [facility] and now with [facility]. Taking atoravastatin 80 mg daily for lipid management and ezetimibe 10 mg, aspirin 81 mg daily . For HFrEF, he is taking metoprolol succinate 12.5 mg daily,  Entresto 24-26 1 tabs twice daily, spironolactone 12.5 mg daily, dapagliflpozin 10 mg daily. On 3/23 he underwent RV lead and generator extraction and upgrade to CRT-D.  No longer needing anticoagulation due to no atrial fib burden on monitoring .States he had a Cardiac Amyloidosis SPECT Scan yesterday.  states overall he feels pretty good. Recent home BP log reviewed above and at goal. /70 in office today, great control.  -I reviewed most recent [place] neurolog[facility] progress note from 11/5/24.  -continue current regimen  Lab Results   Component Value Date    CHLPL 126 11/13/2023    TRIG 131 11/13/2023    HDL 44 11/13/2023    LDL 59 11/13/2023     -last lipid with great control    5. Benign paroxysmal positional vertigo due to bilateral vestibular disorder  -     Ambulatory Referral to Physical Therapy for Evaluation & Treatment            Follow Up  Return for Next scheduled follow up.    Patient/family had no further questions at this time and verbalized understanding of the plan discussed today.

## 2024-12-10 ENCOUNTER — TELEPHONE (OUTPATIENT)
Dept: INTERNAL MEDICINE | Facility: CLINIC | Age: 79
End: 2024-12-10
Payer: MEDICARE

## 2024-12-10 ENCOUNTER — HOSPITAL ENCOUNTER (OUTPATIENT)
Dept: PHYSICAL THERAPY | Facility: HOSPITAL | Age: 79
Setting detail: THERAPIES SERIES
Discharge: HOME | End: 2024-12-10
Payer: MEDICARE

## 2024-12-10 DIAGNOSIS — R42 DIZZINESS: Primary | ICD-10-CM

## 2024-12-10 PROCEDURE — 97161 PT EVAL LOW COMPLEX 20 MIN: CPT

## 2024-12-10 NOTE — TELEPHONE ENCOUNTER
Please call pt and let him know I saw his Physical therapy note and they documented his BP dropped while going from seated to standing and they believe this is the cause of his lightheadedness.     He should make an appointment to discuss with either me or his cardiologist to consider medication adjustments.

## 2024-12-10 NOTE — THERAPY EVALUATION
Outpatient Physical Therapy Vestibular Initial Evaluation  [facility]     Patient Name: Orion Zepeda  : 1945  MRN: [id number]  Today's Date: 12/10/2024      Visit Date: 12/10/2024    Patient Active Problem List   Diagnosis    Third degree heart block    Syncope    Shortness of breath    Ventricular tachyarrhythmia    Cardiomyopathy    Ischemic cardiomyopathy        Past Medical History:   Diagnosis Date    Arthritis     Atrial fibrillation     Complete heart block 2019    Coronary artery disease     Dislocated shoulder, right, subsequent encounter     Emphysema of lung     HFrEF (heart failure with reduced ejection fraction)     History of staph infection     AFTER SHOULDER SURGERY IN THE 70'S    Hyperlipidemia     Lung nodule     Nasal septal deviation     Nasal turbinate hypertrophy     GREGORIO (obstructive sleep apnea)     Polyneuropathy     EMG     Prediabetes     SOB (shortness of breath) on exertion     Syncope         Past Surgical History:   Procedure Laterality Date    CARDIAC CATHETERIZATION N/A 3/13/2023    Procedure: Left Heart Cath;  Surgeon: Rod Ash MD;  Location: [facility] INVASIVE LOCATION;  Service: Cardiology;  Laterality: N/A;    CARDIAC ELECTROPHYSIOLOGY PROCEDURE N/A 2019    Procedure: Temporary Pacemaker;  Surgeon: Vonnie Jeffries MD;  Location: [facility] CATH INVASIVE LOCATION;  Service: Cardiology    CARDIAC ELECTROPHYSIOLOGY PROCEDURE N/A 2019    Procedure: Pacemaker DC new  [place];  Surgeon: Bryn Gordon MD;  Location: [facility] CATH INVASIVE LOCATION;  Service: Cardiovascular    CARDIAC ELECTROPHYSIOLOGY PROCEDURE N/A 2019    Procedure: Temporary Pacemaker- Reposition;  Surgeon: Vonnie Jeffries MD;  Location: [facility] CATH INVASIVE LOCATION;  Service: Cardiology    DIAGNOSTIC LAPAROSCOPY N/A 09/15/2019    Procedure: DIAGNOSTIC LAPAROSCOPY;  Surgeon: Sonya Sheikh MD;  Location: [facility] OR;  Service: General    IMPLANTABLE  CARDIOVERTER DEFIBRILLATOR LEAD REPLACEMENT/POCKET REVISION N/A 3/23/2023    Procedure: LASER PPM lead extraction transvenous; AICD LEAD IMPLANT, CRT PLACEMENT, WITH GENERATOR IMPLANT;  Surgeon: Bryn Gordon MD;  Location: [facility];  Service: Cardiology;  Laterality: N/A;  removal of current pacemaker generator and leads. reimplant Biventricular ICD-- [place]    INGUINAL HERNIA REPAIR N/A 09/15/2019    Procedure: left INGUINAL HERNIA REPAIR LAPAROSCOPIC, REDUCTION OF SMALL BOWEL;  Surgeon: Sonya Sheikh MD;  Location: [facility] OR;  Service: General    SEPTOPLASTY, RESECTION INFERIOR TURBINATES N/A 6/21/2022    Procedure: SEPTOPLASTY, SUBMUCOUS RESECTION INFERIOR TURBINATES, NASAL ENDOSCOPY and ENDOSCOPIC POLYPECTOMY;  Surgeon: Nj Peterson MD;  Location: [facility];  Service: ENT;  Laterality: N/A;    SHOULDER SURGERY Left     in his 20s; ligamentous surgery         Visit Dx:     ICD-10-CM ICD-9-CM   1. Dizziness  R42 780.4        Patient History       Row Name 12/10/24 1400             History    Chief Complaint Balance Problems;Dizziness  -MO      Brief Description of Current Complaint 80 y/o male pt reports to PT w/ ongoing chronic dizziness for about 2 years now. Pt reports that symptoms used to be intermittent, he would go several weeks without any however it has progressively gotten worse and now has it constantly. Currently symptoms are onset anytime he stands up, feels like head is rocking and is a lot of pressure, lasts until he sits down. Denies any onset of symptoms with any other changes in position. Once standing for ~1 minute, is so bad feels like he is going to fall over. Denies any falls from this. Uses an AD at all times secondary to unsteadiness. Had pacemaker placed around 4-5 years ago. Denies any spinning sensation.  -MO      Patient/Caregiver Goals Know what to do to help the symptoms  -MO         Pain     Pain Location Other (Comment)  N/A balance  -MO         Fall Risk  Assessment    Any falls in the past year: No  -MO         Services    Prior Rehab/Home Health Experiences No  -MO         Daily Activities    Primary Language English  -MO      Are you able to read Yes  -MO      Are you able to write Yes  -MO      How does patient learn best? Listening;Reading;Demonstration  -MO      Does patient have problems with the following? None  -MO      Barriers to learning None  -MO      Pt Participated in POC and Goals Yes  -MO         Safety    Are you being hurt, hit, or frightened by anyone at home or in your life? No  -MO      Are you being neglected by a caregiver No  -MO      Have you had any of the following issues with N/A  -MO                User Key  (r) = Recorded By, (t) = Taken By, (c) = Cosigned By      Initials Name Provider Type    Wilda Rascon PT Physical Therapist                     Vestibular Indigo       Row Name 12/10/24 1500             Vital Signs    Pre Systolic BP Rehab 151  initial seated  -MO      Pre Treatment Diastolic BP 96  -MO      Intra Systolic BP Rehab 105  initial standing  -MO      Intra Treatment Diastolic BP 84  -MO      Post Systolic BP Rehab 138  standing after 1 min  -MO      Post Treatment Diastolic BP 91  -MO      Vitals Comment Supine vitals : supine 137/87. Seated 112/84  -MO         Occulomotor Exam Fixation Present    Occular ROM Normal  -MO      Spontaneous Nystagmus Absent  -MO      Gaze-induced Nystagmus Absent  -MO      Smooth Pursuit Normal  -MO      Saccades Intact  -MO      Convergence Normal  -MO         Positional Testing    Positional Testing With infrared goggles  -MO      Vertebrobasilar Artery Screen - Right Negative  -MO      Vertebrobasilar Artery Screen - Left Negative  -MO      [place]-Hallpike Right No nystagmus  -MO      Wing-Hallpike Left No nystagmus  -MO      Horizontal Roll Test Right No nystagmus  -MO      Horizontal Roll Test Left No nystagmus  -MO         General ROM    GENERAL ROM COMMENTS cervical ROM WFL  -MO                 User Key  (r) = Recorded By, (t) = Taken By, (c) = Cosigned By      Initials Name Provider Type    Wilda Rascon PT Physical Therapist                                Therapy Education  Education Details: Educated on PT role and POC; purpose of vestibular therapy including anatomy of vestibular system and 3 semi-circular canals; utilized model to enhance understanding. Discussed BPPV vs. Differential diagnoses. Educated on orthostatic hypotension, discussed safety strategies and approrpaite hydration  Given: Symptoms/condition management  How Provided: Verbal  Provided to: Patient  Level of Understanding: Verbalized                       PT OP Goals       Row Name 12/10/24 1500          PT Short Term Goals    STG Date to Achieve 01/09/25  -MO     STG 1 evaluation only  -MO               User Key  (r) = Recorded By, (t) = Taken By, (c) = Cosigned By      Initials Name Provider Type    Wilda Rascon PT Physical Therapist                     PT Assessment/Plan       Row Name 12/10/24 1500          PT Assessment    Functional Limitations Decreased safety during functional activities;Performance in leisure activities;Performance in self-care ADL  -MO     Impairments Balance  -MO     Assessment Comments Orion Zepeda is a 79 y.o. male referred to physical therapy for vestibular evaluation. He presents with an evolving clinical presentation. His hx includes pacemaker placement in 2019, chronic systolic heart failure that may impact his progress in the plan of care. Pt presents today with overall unremarkable oculomotor eye exam and is (-) for BPPV testing bilaterally in all canals. He is however (+) for orthostatic testing with supine to sit and with sit to stand. Supine /87, drops with transition to longsit to 112/84. Most significant drop with sit to stand as is pts worst reported symptoms. Seated /96, initial transition to standing pt has drop to 105/84. After 1 minute, raises to 138/91.  Additionally c/o symptoms consistent with hypotension. Throughout session, no other symptoms elicited, he reports these reproduced symptoms are what happens daily at home. His signs and symptoms are not consistent with vertigo however appear to be more cardiac in nature. Spent time discussing safety education, taking extra time for change in positions as well as discussed appropriate water intake for hydration. Encouraged f/u with PCP and cardiologist, will plan to reach out to each to discuss findings.  -MO     Please refer to paper survey for additional self-reported information No  -MO     Rehab Potential Fair  -MO     Patient/caregiver participated in establishment of treatment plan and goals Yes  -MO     Patient would benefit from skilled therapy intervention Yes  -MO        PT Plan    PT Frequency One time visit  -MO     Predicted Duration of Therapy Intervention (PT) evaluation only  -MO     Planned CPT's? PT EVAL LOW COMPLEXITY: 66849;PT RE-EVAL: 28512;PT THER PROC EA 15 MIN: 39688;PT THER ACT EA 15 MIN: 14957;PT MANUAL THERAPY EA 15 MIN: 53151;PT NEUROMUSC RE-EDUCATION EA 15 MIN: 16702;PT GAIT TRAINING EA 15 MIN: 86836;PT EVAL AQUA: 23224;PT AQUATIC THERAPY EA 15 MIN: 70037;PT SELF CARE/HOME MGMT/TRAIN EA 15: 29613;PT CANALITH REPOSITIONIN  -MO     PT Plan Comments evaluation only, discussed f/u w/ PCP and cardiology  -MO               User Key  (r) = Recorded By, (t) = Taken By, (c) = Cosigned By      Initials Name Provider Type    Wilda Rascon, PT Physical Therapist                               Time Calculation:   Start Time: 1445  Stop Time: 1520  Time Calculation (min): 35 min  Untimed Charges  PT Eval/Re-eval Minutes: 35  Total Minutes  Untimed Charges Total Minutes: 35   Total Minutes: 35   Therapy Charges for Today       Code Description Service Date Service Provider Modifiers Qty    18179901257 HC PT EVAL LOW COMPLEXITY 2 12/10/2024 Wilda Gauthier, PT GP 1                      Wilda Gauthier,  PT  12/10/2024

## 2024-12-16 ENCOUNTER — OFFICE VISIT (OUTPATIENT)
Dept: INTERNAL MEDICINE | Facility: CLINIC | Age: 79
End: 2024-12-16
Payer: MEDICARE

## 2024-12-16 VITALS
OXYGEN SATURATION: 96 % | DIASTOLIC BLOOD PRESSURE: 80 MMHG | WEIGHT: 236 LBS | TEMPERATURE: 98.1 F | HEIGHT: 71 IN | BODY MASS INDEX: 33.04 KG/M2 | SYSTOLIC BLOOD PRESSURE: 130 MMHG | HEART RATE: 95 BPM

## 2024-12-16 DIAGNOSIS — I50.20 HFREF (HEART FAILURE WITH REDUCED EJECTION FRACTION): Primary | ICD-10-CM

## 2024-12-16 DIAGNOSIS — I44.2 COMPLETE HEART BLOCK: ICD-10-CM

## 2024-12-16 DIAGNOSIS — I25.10 CORONARY ARTERY DISEASE INVOLVING NATIVE CORONARY ARTERY OF NATIVE HEART WITHOUT ANGINA PECTORIS: ICD-10-CM

## 2024-12-16 DIAGNOSIS — R42 ORTHOSTATIC DIZZINESS: ICD-10-CM

## 2024-12-16 PROCEDURE — G2211 COMPLEX E/M VISIT ADD ON: HCPCS | Performed by: STUDENT IN AN ORGANIZED HEALTH CARE EDUCATION/TRAINING PROGRAM

## 2024-12-16 PROCEDURE — 1126F AMNT PAIN NOTED NONE PRSNT: CPT | Performed by: STUDENT IN AN ORGANIZED HEALTH CARE EDUCATION/TRAINING PROGRAM

## 2024-12-16 PROCEDURE — 99214 OFFICE O/P EST MOD 30 MIN: CPT | Performed by: STUDENT IN AN ORGANIZED HEALTH CARE EDUCATION/TRAINING PROGRAM

## 2024-12-16 NOTE — PROGRESS NOTES
"    Chief Complaint  Follow-up on blood pressure    SUBJECTIVE    History of Present Illness    Orion Zepeda is a 79 y.o. male who presents to the office today as an established patient that last saw me on 11/20/2024.        Atrial fibrillation. HFrEF, complete heart block, CAD/HLD : has follolwed with [facility] and now with [facility]. Taking atoravastatin 80 mg daily for lipid management and ezetimibe 10 mg, aspirin 81 mg daily . For HFrEF, he is taking metoprolol succinate 12.5 mg daily,  Entresto 24-26 1 tabs twice daily, spironolactone 12.5 mg daily, dapagliflpozin 10 mg daily. On 3/23 he underwent RV lead and generator extraction and upgrade to CRT-D.  No longer needing anticoagulation due to no atrial fib burden on monitoring .  States right now he feels horrible, when he stands up he feels wobbly \"right off the bat\". Takes him 30 seconds to a minute to adjust. BP at home 110s to 120s/60s-70. States he feels balanced and like his head is \"all clogged up\".   He reports being low at physical therapy when going from seated to standing.     No Known Allergies     Outpatient Medications Marked as Taking for the 12/16/24 encounter (Office Visit) with Bradley Retana,    Medication Sig Dispense Refill    ASPIRIN 81 PO Take 81 mg by mouth Daily.      atorvastatin (LIPITOR) 80 MG tablet Take 1 tablet by mouth once daily 90 tablet 2    dapagliflozin Propanediol 10 MG tablet Take 10 mg by mouth Daily.      ezetimibe (ZETIA) 10 MG tablet Take 1 tablet by mouth once daily 90 tablet 0    metoprolol succinate XL (TOPROL-XL) 25 MG 24 hr tablet Take 0.5 tablets by mouth Daily.      multivitamin with minerals tablet tablet Take 1 tablet by mouth Daily.      Omega-3 Fatty Acids (fish oil) 1000 MG capsule capsule Take 1 capsule by mouth Daily.      sacubitril-valsartan (Entresto) 24-26 MG tablet Take 1 tablet by mouth 2 (Two) Times a Day.      sodium chloride 0.65 % nasal spray Administer 1 spray into the " "nostril(s) as directed by provider As Needed for Congestion.      [DISCONTINUED] spironolactone (ALDACTONE) 25 MG tablet Take 0.5 tablets by mouth Daily.          Past Medical History:   Diagnosis Date    Arthritis     Atrial fibrillation     Complete heart block 2019    Coronary artery disease     Dislocated shoulder, right, subsequent encounter 2011    Emphysema of lung     HFrEF (heart failure with reduced ejection fraction)     History of staph infection     AFTER SHOULDER SURGERY IN THE 70'S    Hyperlipidemia     Lung nodule     Nasal septal deviation     Nasal turbinate hypertrophy     GREGORIO (obstructive sleep apnea)     Polyneuropathy     EMG 2024    Prediabetes     SOB (shortness of breath) on exertion     Syncope 2018       OBJECTIVE    Vital Signs:   /80   Pulse 95   Temp 98.1 °F (36.7 °C) (Infrared)   Ht 180 cm (70.87\")   Wt 107 kg (236 lb)   SpO2 96%   BMI 33.04 kg/m²        Physical Exam  Vitals reviewed.   Constitutional:       General: He is not in acute distress.     Appearance: He is obese. He is not ill-appearing.   Eyes:      General: No scleral icterus.  Cardiovascular:      Rate and Rhythm: Normal rate and regular rhythm.      Heart sounds: Normal heart sounds. No murmur heard.  Pulmonary:      Effort: Pulmonary effort is normal. No respiratory distress.      Breath sounds: Normal breath sounds. No wheezing.   Musculoskeletal:      Right lower leg: No edema.      Left lower leg: No edema.      Comments: Ambulating with cane   Skin:     Coloration: Skin is not jaundiced.   Neurological:      Mental Status: He is alert.   Psychiatric:         Mood and Affect: Mood normal.         Behavior: Behavior normal.         Thought Content: Thought content normal.                             ASSESSMENT & PLAN     Diagnoses and all orders for this visit:    1. HFrEF (heart failure with reduced ejection fraction) (Primary)  2. Coronary artery disease involving native coronary artery of native heart " "without angina pectoris  3. Complete heart block  4. Orthostatic dizziness  -has follolwed with [facility] and now with [facility]. Taking atoravastatin 80 mg daily for lipid management and ezetimibe 10 mg, aspirin 81 mg daily . For HFrEF, he is taking metoprolol succinate 12.5 mg daily,  Entresto 24-26 1 tabs twice daily, spironolactone 12.5 mg daily, dapagliflpozin 10 mg daily. On 3/23 he underwent RV lead and generator extraction and upgrade to CRT-D.  No longer needing anticoagulation due to no atrial fib burden on monitoring .  States right now he feels horrible, when he stands up he feels wobbly \"right off the bat\". Takes him 30 seconds to a minute to adjust. BP at home 110s to 120s/60s-70. States he feels balanced and like his head is \"all clogged up\".   He reports being low at physical therapy when going from seated to standing.   -Per 12/10/24 physical therapy progress note \"He is however (+) for orthostatic testing with supine to sit and with sit to stand. Supine /87, drops with transition to longsit to 112/84. Most significant drop with sit to stand as is pts worst reported symptoms. Seated /96, initial transition to standing pt has drop to 105/84. After 1 minute, raises to 138/91. Additionally c/o symptoms consistent with hypotension. \"  -BP in office 130/80 today. Per review of his most recent [facility] cardiology note, if symptoms were to persist he is to stop his spironolactone. I recommend we make that change today and STOP spironolactone and see how he responds. I would like him to begin checking orthostatic BP at home and advised him to be very careful when going from seated to standing. He has a follow up next month with his cardiologist and I advised him to bring his BP log to that visit so he can get reassessed and have any further changes made. He is agreeable to this plan.         Follow Up  No follow-ups on file.    Patient/family had no further questions at this time " and verbalized understanding of the plan discussed today.

## 2025-02-10 DIAGNOSIS — I25.10 CORONARY ARTERY DISEASE INVOLVING NATIVE CORONARY ARTERY OF NATIVE HEART WITHOUT ANGINA PECTORIS: ICD-10-CM

## 2025-02-10 RX ORDER — EZETIMIBE 10 MG/1
10 TABLET ORAL DAILY
Qty: 90 TABLET | Refills: 2 | Status: SHIPPED | OUTPATIENT
Start: 2025-02-10

## 2025-05-11 DIAGNOSIS — E78.2 MIXED HYPERLIPIDEMIA: ICD-10-CM

## 2025-05-11 DIAGNOSIS — I25.10 CORONARY ARTERY DISEASE INVOLVING NATIVE CORONARY ARTERY OF NATIVE HEART WITHOUT ANGINA PECTORIS: ICD-10-CM

## 2025-05-12 RX ORDER — ATORVASTATIN CALCIUM 80 MG/1
80 TABLET, FILM COATED ORAL DAILY
Qty: 90 TABLET | Refills: 3 | Status: SHIPPED | OUTPATIENT
Start: 2025-05-12

## 2025-07-09 ENCOUNTER — HOSPITAL ENCOUNTER (OUTPATIENT)
Dept: CT IMAGING | Facility: HOSPITAL | Age: 80
Discharge: HOME | End: 2025-07-09
Payer: MEDICARE

## 2025-07-09 ENCOUNTER — HOSPITAL ENCOUNTER (OUTPATIENT)
Dept: ULTRASOUND IMAGING | Facility: HOSPITAL | Age: 80
Discharge: HOME | End: 2025-07-09
Payer: MEDICARE

## 2025-07-09 DIAGNOSIS — R91.1 LUNG NODULE: ICD-10-CM

## 2025-07-09 DIAGNOSIS — I71.40 ABDOMINAL AORTIC ANEURYSM (AAA) WITHOUT RUPTURE, UNSPECIFIED PART: ICD-10-CM

## 2025-07-09 PROCEDURE — 71250 CT THORAX DX C-: CPT

## 2025-07-09 PROCEDURE — 76775 US EXAM ABDO BACK WALL LIM: CPT

## 2025-07-13 ENCOUNTER — TELEPHONE (OUTPATIENT)
Dept: INTERNAL MEDICINE | Facility: CLINIC | Age: 80
End: 2025-07-13
Payer: MEDICARE

## 2025-07-13 NOTE — TELEPHONE ENCOUNTER
Radiology called on 7/13 to advise.  AAA has increased over course of one year from 4.2 cm to 5.6 cm.  I placed call to patient.  He is not symptomatic.  He declines any abdominal pain.  He is advised for any abdominal pain to go directly to the ER.  I will discuss with Dr. Retana on Monday 7/14.

## 2025-07-15 ENCOUNTER — PATIENT MESSAGE (OUTPATIENT)
Dept: INTERNAL MEDICINE | Facility: CLINIC | Age: 80
End: 2025-07-15
Payer: MEDICARE

## 2025-08-12 ENCOUNTER — OFFICE VISIT (OUTPATIENT)
Dept: INTERNAL MEDICINE | Facility: CLINIC | Age: 80
End: 2025-08-12
Payer: MEDICARE

## 2025-08-12 VITALS
HEART RATE: 104 BPM | HEIGHT: 71 IN | BODY MASS INDEX: 33.32 KG/M2 | TEMPERATURE: 96.7 F | DIASTOLIC BLOOD PRESSURE: 82 MMHG | WEIGHT: 238 LBS | SYSTOLIC BLOOD PRESSURE: 150 MMHG | OXYGEN SATURATION: 97 %

## 2025-08-12 DIAGNOSIS — I50.20 HFREF (HEART FAILURE WITH REDUCED EJECTION FRACTION): ICD-10-CM

## 2025-08-12 DIAGNOSIS — E78.2 MIXED HYPERLIPIDEMIA: ICD-10-CM

## 2025-08-12 DIAGNOSIS — U07.1 COVID-19 VIRUS INFECTION: Primary | ICD-10-CM

## 2025-08-12 DIAGNOSIS — J44.9 CHRONIC OBSTRUCTIVE PULMONARY DISEASE, UNSPECIFIED COPD TYPE: ICD-10-CM

## 2025-08-12 DIAGNOSIS — I25.10 CORONARY ARTERY DISEASE INVOLVING NATIVE CORONARY ARTERY OF NATIVE HEART WITHOUT ANGINA PECTORIS: ICD-10-CM

## 2025-08-12 PROCEDURE — 1170F FXNL STATUS ASSESSED: CPT | Performed by: STUDENT IN AN ORGANIZED HEALTH CARE EDUCATION/TRAINING PROGRAM

## 2025-08-12 PROCEDURE — 99214 OFFICE O/P EST MOD 30 MIN: CPT | Performed by: STUDENT IN AN ORGANIZED HEALTH CARE EDUCATION/TRAINING PROGRAM

## 2025-08-12 PROCEDURE — 1126F AMNT PAIN NOTED NONE PRSNT: CPT | Performed by: STUDENT IN AN ORGANIZED HEALTH CARE EDUCATION/TRAINING PROGRAM

## 2025-08-12 RX ORDER — FUROSEMIDE 20 MG/1
20 TABLET ORAL DAILY
COMMUNITY
Start: 2025-07-01

## 2025-08-12 RX ORDER — SACUBITRIL AND VALSARTAN 24; 26 MG/1; MG/1
0.5 TABLET, FILM COATED ORAL 2 TIMES DAILY
Status: SHIPPED | COMMUNITY
Start: 2025-08-12

## 2025-08-12 RX ORDER — PREDNISONE 20 MG/1
1 TABLET ORAL EVERY 12 HOURS SCHEDULED
COMMUNITY
Start: 2025-08-10

## (undated) DEVICE — DRVR HEXSTAR

## (undated) DEVICE — CATH DIAG DXTERITY ULTRA TRANSRADIAL 4.0 5F 100CM 0/SH

## (undated) DEVICE — VISISHEATH™ DILATOR SHEATH: Brand: VISISHEATH™

## (undated) DEVICE — SOL IRR NACL 0.9PCT BT 1000ML

## (undated) DEVICE — GLV SURG BIOGEL LTX PF 6 1/2

## (undated) DEVICE — OVAL SHAPE BALLOON: Brand: EXTRA VIEW

## (undated) DEVICE — APPL CHLORAPREP HI/LITE 26ML ORNG

## (undated) DEVICE — TROC SYS CANN HERN EZ PRT

## (undated) DEVICE — CATH PACE CARD SITE/SPEC RT/ATRIUM RT/VENT SEPTL DIL EXT/HK

## (undated) DEVICE — PINNACLE INTRODUCER SHEATH: Brand: PINNACLE

## (undated) DEVICE — BLADE 1884004 TRICUT 5PK 4MM: Brand: TRICUT®

## (undated) DEVICE — SENSR CERBRL O2 PK/2

## (undated) DEVICE — EQUIPMENT COVER BAG TYPE 48” X 36” (122CM X 91CM): Brand: EQUIPMENT COVER BAG TYPE

## (undated) DEVICE — INTRO SHEATH PRELUDE SNAP .038 8F 13CM W/SDPRT

## (undated) DEVICE — Device: Brand: FABCO

## (undated) DEVICE — SUT GUT PLN 4/0 SC1 18IN 1824H

## (undated) DEVICE — ADHS SKIN DERMABOND TOP ADVANCED

## (undated) DEVICE — KT MANIFLD CARDIAC

## (undated) DEVICE — SLV STRL PROB

## (undated) DEVICE — Device

## (undated) DEVICE — HDRST POSITIONING FM RND 2X9IN

## (undated) DEVICE — LOU PACE DEFIB: Brand: MEDLINE INDUSTRIES, INC.

## (undated) DEVICE — INTRO SHEATH PRELUDE SNAP .038 6F 13CM W/SDPRT

## (undated) DEVICE — SUT GUT CHRM 4/0 PS2 18IN 1637G

## (undated) DEVICE — SPLINT 1524050 5PK PAIR DOYLE II AIRWAY: Brand: DOYLE II ™

## (undated) DEVICE — INTRO LAT VEIN WORLEY ADV RENAL 5.5F 62CM

## (undated) DEVICE — CATH ELECTRD PACE TEMP BI NONHEP 5F110CM

## (undated) DEVICE — 3M™ IOBAN™ 2 ANTIMICROBIAL INCISE DRAPE 6650EZ: Brand: IOBAN™ 2

## (undated) DEVICE — SOL NACL 0.9PCT 1000ML

## (undated) DEVICE — GLIDESHEATH SLENDER STAINLESS STEEL KIT: Brand: GLIDESHEATH SLENDER

## (undated) DEVICE — GW CHOICE PT PLS .014 182CM

## (undated) DEVICE — SUCTION COAGULATOR, 1 PER POUCH: Brand: A&E MEDICAL / DISPOSABLE SUCTION COAGULATOR

## (undated) DEVICE — VISUALIZATION SYSTEM: Brand: CLEARIFY

## (undated) DEVICE — LOU LAP CHOLE: Brand: MEDLINE INDUSTRIES, INC.

## (undated) DEVICE — GW AMPLATZ  XSTIFF CVD .032 3MM 180CM

## (undated) DEVICE — SOL ISO/ALC 70PCT 4OZ

## (undated) DEVICE — LIMB HOLDER, WRIST/ANKLE: Brand: DEROYAL

## (undated) DEVICE — INTRO SHEATH ART/FEM ENGAGE .035 6F12CM

## (undated) DEVICE — DISPOSABLE ADAPTER

## (undated) DEVICE — GW XCHG AMPLTZ XSTIF PTFE CRV .035IN 3X180CM

## (undated) DEVICE — KT ANTI FOG W/FLD AND SPNG

## (undated) DEVICE — ELECTRD ECG CARBON/SNP RL FM A/ 5PK

## (undated) DEVICE — VICRYL VLT 0 45CM ENDOLOOP: Brand: ENDOLOOP

## (undated) DEVICE — TBG ART PRESS 60 IN

## (undated) DEVICE — PK ENT 40

## (undated) DEVICE — MEDICINE CUP, GRADUATED, STER: Brand: MEDLINE

## (undated) DEVICE — PK CATH CARD 40

## (undated) DEVICE — THE PHOTONBLADE WITH ADAPTIVE SMOKE EVACUATION IS A MONOPOLAR RF DEVICE COUPLED WITH ILLUMINATION THAT IS INDICATED FOR CUTTING AND COAGULATION OF TISSUE DURING GENERAL SURGICAL PROCEDURES AND FOR REMOVING SMOKE GENERATED BY ELECTROSURGERY WHEN USED IN CONJUNCTION WITH AN EFFECTIVE SMOKE EVACUATION SYSTEM.: Brand: PHOTONBLADE WITH ADAPTIVE SMOKE EVACUATION

## (undated) DEVICE — DISPOSABLE GRASPER CARTRIDGE: Brand: DIRECT DRIVE REPOSABLE GRASPERS

## (undated) DEVICE — GLV SURG SIGNATURE ESSENTIAL PF LTX SZ8

## (undated) DEVICE — SUT VIC 0 TN 27IN DYED JTN0G

## (undated) DEVICE — BLD SAW STERN L 32.0MM H 6.0MM

## (undated) DEVICE — 3M™ STERI-STRIP™ REINFORCED ADHESIVE SKIN CLOSURES, R1546, 1/4 IN X 4 IN (6 MM X 100 MM), 10 STRIPS/ENVELOPE: Brand: 3M™ STERI-STRIP™

## (undated) DEVICE — GLIDELIGHT™ LASER SHEATH: Brand: GLIDELIGHT™

## (undated) DEVICE — SUT SILK 0 CT1 CR8 18IN C021D

## (undated) DEVICE — APPL CHLORAPREP W/TINT 26ML ORNG

## (undated) DEVICE — PACE/SENSE LEAD
Type: IMPLANTABLE DEVICE | Site: HEART | Status: NON-FUNCTIONAL
Brand: ACUITY™ X4 SPIRAL S
Removed: 2023-03-23

## (undated) DEVICE — SPONGE,NEURO,0.5"X3",XR,STRL,LF,10/PK: Brand: MEDLINE

## (undated) DEVICE — SUT GUT CHRM 5/0 P3 18IN 687G

## (undated) DEVICE — FIRM 8CM: Brand: NASOPORE

## (undated) DEVICE — NDL HYPO PRECISIONGLIDE REG 25G 1 1/2

## (undated) DEVICE — COVER,MAYO STAND,STERILE: Brand: MEDLINE

## (undated) DEVICE — GLV SURG PREMIERPRO ORTHO LTX PF SZ7.5 BRN

## (undated) DEVICE — SUT SILK 2 SUTUPAK TIE 60IN SA8H 2STRAND

## (undated) DEVICE — LEAD LOCKING DEVICE (LLD EZ): Brand: LLD EZ™

## (undated) DEVICE — TOWEL,OR,DSP,ST,BLUE,STD,4/PK,20PK/CS: Brand: MEDLINE

## (undated) DEVICE — TUBING, SUCTION, 1/4" X 20', STRAIGHT: Brand: MEDLINE INDUSTRIES, INC.

## (undated) DEVICE — LLD™ ACCESSORY KIT: Brand: LLD™

## (undated) DEVICE — UNDYED MONOFILAMENT (POLYDIOXANONE), ABSORBABLE SURGICAL SUTURE: Brand: PDS

## (undated) DEVICE — GW AMPLTZ SUPERSTIFF STR .035IN 180CM

## (undated) DEVICE — INTRO SHEATH PRELUDE SNAP .038 9F 13CM W/SDPRT BLK

## (undated) DEVICE — TRY INTRO PERC 6F

## (undated) DEVICE — HI-TORQUE BALANCE MIDDLEWEIGHT GUIDE WIRE .014 STRAIGHT TIP 3.0 CM X 190 CM: Brand: HI-TORQUE BALANCE MIDDLEWEIGHT

## (undated) DEVICE — SUT ETHIB 0/0 CT1 30IN X424H

## (undated) DEVICE — SYS PREP BRIDGESHEATH OCCL HEMOS

## (undated) DEVICE — 3M™ STERI-STRIP™ REINFORCED ADHESIVE SKIN CLOSURES, R1547, 1/2 IN X 4 IN (12 MM X 100 MM), 6 STRIPS/ENVELOPE: Brand: 3M™ STERI-STRIP™

## (undated) DEVICE — SUT ETHIB 0/0 SH 60IN D8724

## (undated) DEVICE — BLUNT TIP TROCAR: Brand: AUTO SUTURE

## (undated) DEVICE — SUT VIC 5/0 PS2 18IN J495H

## (undated) DEVICE — AIRLIFE™ NASAL OXYGEN CANNULA CURVED, NONFLARED TIP WITH 21 FOOT (6.4 M) CRUSH-RESISTANT TUBING, OVER-THE-EAR STYLE: Brand: AIRLIFE™

## (undated) DEVICE — NDL HYPO ECLPS SFTY 22G 1 1/2IN

## (undated) DEVICE — GW EMR FIX EXCHG J STD .035 3MM 260CM

## (undated) DEVICE — BALN CORNRY SINUS 6F 1X80CM

## (undated) DEVICE — ENDOPATH PNEUMONEEDLE INSUFFLATION NEEDLES WITH LUER LOCK CONNECTORS 120MM: Brand: ENDOPATH

## (undated) DEVICE — SKIN PREP TRAY W/CHG: Brand: MEDLINE INDUSTRIES, INC.

## (undated) DEVICE — ENDOPATH XCEL BLADELESS TROCARS WITH STABILITY SLEEVES: Brand: ENDOPATH XCEL

## (undated) DEVICE — DRSNG SURESITE WNDW 4X4.5

## (undated) DEVICE — WIPE INST MEROCEL

## (undated) DEVICE — PENCL E/S HNDSWCH ROCKR CB

## (undated) DEVICE — KT 2 CONTRST ADMIN

## (undated) DEVICE — PK PROC MAJ 40

## (undated) DEVICE — DRAPE,REIN 53X77,STERILE: Brand: MEDLINE

## (undated) DEVICE — CATH WORLEY STD 9F40CM

## (undated) DEVICE — VEIN SELCT VERT 75CM